# Patient Record
Sex: MALE | Race: WHITE
[De-identification: names, ages, dates, MRNs, and addresses within clinical notes are randomized per-mention and may not be internally consistent; named-entity substitution may affect disease eponyms.]

---

## 2019-07-18 ENCOUNTER — HOSPITAL ENCOUNTER (OUTPATIENT)
Dept: HOSPITAL 97 - ER | Age: 29
Setting detail: OBSERVATION
LOS: 1 days | Discharge: HOME | End: 2019-07-19
Attending: FAMILY MEDICINE | Admitting: HOSPITALIST
Payer: SELF-PAY

## 2019-07-18 VITALS — BODY MASS INDEX: 27.1 KG/M2

## 2019-07-18 DIAGNOSIS — I88.0: ICD-10-CM

## 2019-07-18 DIAGNOSIS — F43.10: ICD-10-CM

## 2019-07-18 DIAGNOSIS — F17.200: ICD-10-CM

## 2019-07-18 DIAGNOSIS — Z79.899: ICD-10-CM

## 2019-07-18 DIAGNOSIS — G40.89: Primary | ICD-10-CM

## 2019-07-18 DIAGNOSIS — F90.9: ICD-10-CM
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
## 2019-07-18 DIAGNOSIS — F32.9: ICD-10-CM

## 2019-07-18 DIAGNOSIS — J98.4: ICD-10-CM

## 2019-07-18 LAB
BUN BLD-MCNC: 15 MG/DL (ref 7–18)
GLUCOSE SERPLBLD-MCNC: 169 MG/DL (ref 74–106)
HCT VFR BLD CALC: 36.9 % (ref 39.6–49)
HCT VFR BLD CALC: 41.1 % (ref 39.6–49)
LIPASE SERPL-CCNC: 481 U/L (ref 73–393)
LYMPHOCYTES # SPEC AUTO: 1.4 K/UL (ref 0.7–4.9)
LYMPHOCYTES # SPEC AUTO: 2.8 K/UL (ref 0.7–4.9)
PMV BLD: 7.9 FL (ref 7.6–11.3)
PMV BLD: 8 FL (ref 7.6–11.3)
POTASSIUM SERPL-SCNC: 4.1 MMOL/L (ref 3.5–5.1)
RBC # BLD: 4.12 M/UL (ref 4.33–5.43)
RBC # BLD: 4.5 M/UL (ref 4.33–5.43)

## 2019-07-18 PROCEDURE — 99285 EMERGENCY DEPT VISIT HI MDM: CPT

## 2019-07-18 PROCEDURE — 84100 ASSAY OF PHOSPHORUS: CPT

## 2019-07-18 PROCEDURE — 85025 COMPLETE CBC W/AUTO DIFF WBC: CPT

## 2019-07-18 PROCEDURE — 71045 X-RAY EXAM CHEST 1 VIEW: CPT

## 2019-07-18 PROCEDURE — 80053 COMPREHEN METABOLIC PANEL: CPT

## 2019-07-18 PROCEDURE — 95816 EEG AWAKE AND DROWSY: CPT

## 2019-07-18 PROCEDURE — 83690 ASSAY OF LIPASE: CPT

## 2019-07-18 PROCEDURE — 81003 URINALYSIS AUTO W/O SCOPE: CPT

## 2019-07-18 PROCEDURE — 80307 DRUG TEST PRSMV CHEM ANLYZR: CPT

## 2019-07-18 PROCEDURE — 81015 MICROSCOPIC EXAM OF URINE: CPT

## 2019-07-18 PROCEDURE — 36415 COLL VENOUS BLD VENIPUNCTURE: CPT

## 2019-07-18 PROCEDURE — 70450 CT HEAD/BRAIN W/O DYE: CPT

## 2019-07-18 PROCEDURE — 80048 BASIC METABOLIC PNL TOTAL CA: CPT

## 2019-07-18 PROCEDURE — 83735 ASSAY OF MAGNESIUM: CPT

## 2019-07-18 PROCEDURE — 83605 ASSAY OF LACTIC ACID: CPT

## 2019-07-18 PROCEDURE — 83880 ASSAY OF NATRIURETIC PEPTIDE: CPT

## 2019-07-18 PROCEDURE — 72125 CT NECK SPINE W/O DYE: CPT

## 2019-07-18 PROCEDURE — 85730 THROMBOPLASTIN TIME PARTIAL: CPT

## 2019-07-18 PROCEDURE — 96360 HYDRATION IV INFUSION INIT: CPT

## 2019-07-18 PROCEDURE — 76700 US EXAM ABDOM COMPLETE: CPT

## 2019-07-18 PROCEDURE — 85610 PROTHROMBIN TIME: CPT

## 2019-07-18 PROCEDURE — 71250 CT THORAX DX C-: CPT
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
## 2019-07-19 VITALS — TEMPERATURE: 98.5 F | SYSTOLIC BLOOD PRESSURE: 142 MMHG | DIASTOLIC BLOOD PRESSURE: 84 MMHG

## 2019-07-19 VITALS — OXYGEN SATURATION: 99 %

## 2019-07-19 LAB
ALBUMIN SERPL BCP-MCNC: 3.8 G/DL (ref 3.4–5)
ALP SERPL-CCNC: 68 U/L (ref 45–117)
ALT SERPL W P-5'-P-CCNC: 54 U/L (ref 12–78)
AST SERPL W P-5'-P-CCNC: 20 U/L (ref 15–37)
BUN BLD-MCNC: 13 MG/DL (ref 7–18)
GLUCOSE SERPLBLD-MCNC: 111 MG/DL (ref 74–106)
HCT VFR BLD CALC: 36.3 % (ref 39.6–49)
INR BLD: 1.07
LIPASE SERPL-CCNC: 534 U/L (ref 73–393)
LYMPHOCYTES # SPEC AUTO: 1.9 K/UL (ref 0.7–4.9)
MAGNESIUM SERPL-MCNC: 2.6 MG/DL (ref 1.8–2.4)
METHAMPHET UR QL SCN: POSITIVE
NT-PROBNP SERPL-MCNC: 17 PG/ML (ref ?–125)
PMV BLD: 7.9 FL (ref 7.6–11.3)
POTASSIUM SERPL-SCNC: 3.8 MMOL/L (ref 3.5–5.1)
RBC # BLD: 4.02 M/UL (ref 4.33–5.43)
THC SERPL-MCNC: NEGATIVE NG/ML
UA COMPLETE W REFLEX CULTURE PNL UR: (no result)
UA DIPSTICK W REFLEX MICRO PNL UR: (no result)

## 2019-07-19 RX ADMIN — SODIUM CHLORIDE SCH MLS: 0.9 INJECTION, SOLUTION INTRAVENOUS at 10:20

## 2019-07-19 RX ADMIN — SODIUM CHLORIDE SCH MLS: 0.9 INJECTION, SOLUTION INTRAVENOUS at 00:05

## 2019-07-19 RX ADMIN — Medication SCH ML: at 00:06

## 2019-07-19 RX ADMIN — Medication SCH ML: at 10:22
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
## 2019-07-19 NOTE — RAD REPORT
EXAM DESCRIPTION:  Oli Single View7/19/2019 7:34 am

 

CLINICAL HISTORY:  Cough

 

COMPARISON:  none

 

FINDINGS:   The lungs appear clear of acute infiltrate. The heart is normal size

 

IMPRESSION:   No acute abnormalities displayed

## 2019-07-19 NOTE — RAD REPORT
EXAM DESCRIPTION:  MRI - Mri Abdomen W/Wo Cont - 7/19/2019 12:05 pm

 

CLINICAL HISTORY:  Abdominal pain

 

COMPARISON:  July 19, 2019 ultrasound

 

July 18, 2019 CT

 

TECHNIQUE:  Axial and coronal magnetic resonance imaging of the the pancreas obtained. 20 cc MultiHan
ce administered intravenously

 

FINDINGS:  The pancreas is normal in size and signal. The pancreas demonstrates normal enhancement.

 

Stranding is present within the fat between the pancreatic head and third portion of the duodenum wit
hout significant change from the recent CAT scan. No pseudocyst.

 

A fluid collection is not seen.

 

IMPRESSION:  Stranding within the fat between the pancreatic head and third portion of the duodenum. 
This may indicate pancreatitis. An injury to the duodenum can also result in this appearance. A fluid
 collection/hematoma is not present.

## 2019-07-19 NOTE — RAD REPORT
EXAM DESCRIPTION:  US - Abdomen Exam Complete - 7/19/2019 9:35 am

 

CLINICAL HISTORY:  Abdominal pain

 

COMPARISON:  none

 

FINDINGS:  The liver has an increased and heterogeneous echotexture.

 

A gallstone is not seen. The gallbladder wall is not thickened. The biliary tree is normal caliber.

 

The pancreas was not well visualized secondary overlying bowel

 

The right kidney measures 11 centimeters with a normal echotexture.

 

The left kidney measures 12 centimeters with a normal echotexture.

 

The spleen measures 12.3 centimeters.

 

Portions of the abdominal aorta are not well visualized. However, the aorta appears to be normal claire
jennie. IVC unremarkable

 

IMPRESSION:  Increased and heterogeneous hepatic echotexture consistent with fatty infiltration/infla
mmation

 

Limited evaluation of the pancreas

## 2019-07-24 NOTE — EEG
CHART:  [id number]

TEST ID#:  [id number]

DATE OF STUDY:  07/19/2019



THE EEG WAS RECORDED PORTABLE IN THE PATIENTS ROOM ON A 17 CHANNEL MACHINE.  ELECTRODES 
WERE APPLIED IN THE USUAL MANNER USING THE INTERNATIONAL 10-20 SYSTEM.



THE WAKING BACKGROUND RHYTHM IN THIS RECORD CONSISTS OF VERY WELL DEVELOPED AND WELL 
ORGANIZED WAVES OF 10 HZ., MAXIMAL IN THE POSTERIOR HEAD REGIONS WHICH ATTENUATE NORMALLY 
WITH EYE OPENING.  LOW-VOLTAGE 18-22 HZ ACTIVITY IS EXPRESSED IN THE FRONTAL REGIONS.



THERE ARE NO FOCAL OR LATERALIZING FEATURES.  NO EPILEPTIFORM ACTIVITY APPEARS.  

SLEEP DID NOT OCCUR.  



HYPERVENTILATION WAS NOT PERFORMED.



PHOTIC STIMULATION PRODUCED FAIR DRIVING BILATERALLY.



IMPRESSION:  NORMAL EEG FOR THE AGE OF THE PATIENT IN [place].

## 2023-08-26 ENCOUNTER — HOSPITAL ENCOUNTER (EMERGENCY)
Dept: HOSPITAL 97 - ER | Age: 33
Discharge: HOME | End: 2023-08-26
Payer: SELF-PAY

## 2023-08-26 VITALS — TEMPERATURE: 98 F

## 2023-08-26 VITALS — OXYGEN SATURATION: 99 % | SYSTOLIC BLOOD PRESSURE: 117 MMHG | DIASTOLIC BLOOD PRESSURE: 74 MMHG

## 2023-08-26 DIAGNOSIS — S90.112A: Primary | ICD-10-CM
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
## 2023-08-26 PROCEDURE — 99283 EMERGENCY DEPT VISIT LOW MDM: CPT

## 2023-08-26 NOTE — EDPHYS
Physician Documentation                                                                           

 [facility]                                                                 

Name: Mc Kearney                                                                           

Age: 33 yrs                                                                                       

Sex: Male                                                                                         

: 1990                                                                                   

MRN: [id number]                                                                                   

Arrival Date: 2023                                                                          

Time: 04:19                                                                                       

Account#: [id number]                                                                            

Bed 5                                                                                             

Private MD:                                                                                       

ED Physician Daniel Bolivar                                                                    

HPI:                                                                                              

                                                                                             

04:24 This 33 yrs old  Male presents to ER via [facility] with complaints of Toe     sp4 

      Injury.                                                                                     

04:31 33-year-old male presents with a left great toe pain . Patient states he was moving a   sp4 

      couch yesterday and dropped it onto the left great toe. Patient was seen at [facility] and diagnosed with a left great toe fracture. Patient was then prescribed Tylenol      

      3 but states pain is not improving.. Patient has extensive past medical history of          

      schizophrenia, chronic pancreatitis, diabetes. History of alcohol abuse as well.            

      History of Depo injections of antipsychotic medications for your schizophrenia..            

                                                                                                  

Historical:                                                                                       

- Allergies:                                                                                      

04:30 PENICILLINS;                                                                            rv  

- PMHx:                                                                                           

04:30 Seizures; Anxiety induced;                                                              rv  

                                                                                                  

- Immunization history:: Adult Immunizations up to date.                                          

- Social history:: Smoking status: Patient reports the use of cigarette tobacco                   

  products, smokes one pack cigarettes per day.                                                   

- Family history:: not pertinent.                                                                 

                                                                                                  

                                                                                                  

ROS:                                                                                              

04:31 Constitutional: Negative for fever, chills, and weight loss, MS/Extremity: Positive for sp4 

      left great toe injury and left great toe pain also left great toe discoloration.            

04:31 All other systems are negative.                                                             

                                                                                                  

Exam:                                                                                             

04:31 Constitutional:  This is a well developed, well nourished patient who is awake, alert,  sp4 

      and in no acute distress. Head/Face:  Normocephalic, atraumatic. Eyes:  Pupils equal        

      round and reactive to light, extra-ocular motions intact.  Lids and lashes normal.          

      Conjunctiva and sclera are not injected.  Cornea within normal limits.  Periorbital         

      areas with no swelling, redness, or edema. ENT:  Nares patent. No nasal discharge, no       

      septal abnormalities noted.  Tympanic membranes are normal and external auditory canals     

      are clear.  Oropharynx with no redness, swelling, or masses, exudates, or evidence of       

      obstruction, uvula midline.  Mucous membranes moist. Neck:  Trachea midline, no             

      thyromegaly or masses palpated, and no cervical lymphadenopathy.  Supple, full range of     

      motion without nuchal rigidity, or vertebral point tenderness.  Chest/axilla:  Normal       

      chest wall appearance and motion.  Nontender with no deformity.  No lesions are             

      appreciated. Cardiovascular:  Regular rate and rhythm with a normal S1 and S2.  No          

      gallops, murmurs, or rubs.  Normal PMI, no JVD.  No pulse deficits. Respiratory:  Lungs     

      have equal breath sounds bilaterally, clear to auscultation and percussion.  No rales,      

      rhonchi or wheezes noted.  No increased work of breathing, no retractions or nasal          

      flaring. Abdomen/GI:  Soft, non-tender, with normal bowel sounds.  No distension or         

      tympany.  No guarding or rebound.  No evidence of tenderness throughout. Back:  No          

      spinal tenderness.  No costovertebral tenderness.  Skin:  Warm, dry with normal turgor.     

       Normal color with no rashes, no lesions, and no evidence of cellulitis. MS/ Extremity:     

       Pulses equal, no cyanosis.  Neurovascular intact.  Full, normal range of motion.           

      Positive for left great toe tenderness, left great toe discoloration without deformity.     

       Normal cap refill Neuro:  Awake and alert, GCS 15, oriented to person, place, time,        

      and situation.  Cranial nerves II-XII grossly intact.  Motor strength 5/5 in all            

      extremities.  Sensory grossly intact.  Psych:  Awake, alert, with orientation to            

      person, place and time.  Behavior, mood, and affect are within normal limits                

                                                                                                  

Vital Signs:                                                                                      

04:29  / 89; Pulse 107; Resp 18; Temp 98; Pulse Ox 98% ; Weight 81.19 kg; Height 5 ft.  rv  

      9 in. ;                                                                                     

06:14  / 74; Pulse 89; Resp 18; Temp 98; Pulse Ox 99% on R/A;                           rv  

04:29 Body Mass Index 26.43 (81.19 kg, 175.26 cm)                                             rv  

                                                                                                  

MDM:                                                                                              

05:12 Patient medically screened.                                                             sp4 

06:07 Data reviewed: vital signs, nurses notes. ED course: EXAM DESCRIPTION: Foot Left 2 View sp4 

      CLINICAL HISTORY: left great toe pain COMPARISON: None. FINDINGS: 2 views of the left       

      foot. No acute fracture or dislocation. Normal osseous mineralization. IMPRESSION: 1.       

      No acute fracture or dislocation.. ED course: Left foot Ortho boot was applied for          

      comfort. No sign of fracture or dislocation. Will advise left foot Ortho boot for the       

      next 2 weeks..                                                                              

                                                                                                  

                                                                                             

04:30 Order name: Foot Left 2 View XRAY                                                       sp4 

                                                                                             

04:37 Order name: Orthopedic shoe: Apply short leg ortho BOOT; Complete Time: 04:39           sp4 

                                                                                                  

Administered Medications:                                                                         

04:39 Drug: Norco PO 10 mg-325 mg 1 tabs Route: PO;                                           rv  

06:14 Follow up: Response: No adverse reaction                                                rv  

04:39 Drug: Diazepam PO 5 mg Route: PO;                                                       rv  

06:14 Follow up: Response: No adverse reaction                                                rv  

04:39 Drug: Ibuprofen  mg Route: PO;                                                    rv  

06:13 Follow up: Response: No adverse reaction                                                rv  

                                                                                                  

                                                                                                  

Disposition Summary:                                                                              

23 06:09                                                                                    

Discharge Ordered                                                                                 

      Location: Home                                                                          sp4 

      Problem: new                                                                            sp4 

      Symptoms: have improved                                                                 sp4 

      Condition: Stable                                                                       sp4 

      Diagnosis                                                                                   

        - Left great toe contusion, left foot crush injury                                    sp4 

      Followup:                                                                               sp4 

        - With: Private Physician                                                                  

        - When: 7 - 10 days                                                                        

        - Reason: Recheck today's complaints                                                       

      Discharge Instructions:                                                                     

        - Discharge Summary Sheet                                                             sp4 

        - Foot Contusion, Easy-to-Read                                                        sp4 

      Forms:                                                                                      

        - Patient Portal Instructions                                                         sp4 

      Prescriptions:                                                                              

        - Ibuprofen 800 mg Oral Tablet                                                             

            - take 1 tablet by ORAL route every 8 hours As needed take with food; 30 tablet;  sp4 

      Refills: 0, Product Selection Permitted                                                     

Signatures:                                                                                       

Dispatcher [employer]                           Mynor Cancino, RN                    RN   Daniel Greer MD MD   sp4                                                  

                                                                                                  

**************************************************************************************************

## 2023-08-26 NOTE — ER
Nurse's Notes                                                                                     

 [facility]                                                                 

Name: Mc Kearney                                                                           

Age: 33 yrs                                                                                       

Sex: Male                                                                                         

: 1990                                                                                   

MRN: [id number]                                                                                   

Arrival Date: 2023                                                                          

Time: 04:19                                                                                       

Account#: [id number]                                                                            

Bed 5                                                                                             

Private MD:                                                                                       

Diagnosis: Left great toe contusion, left foot crush injury                                       

                                                                                                  

Presentation:                                                                                     

                                                                                             

04:29 Chief complaint: Patient states: INJURY TO THE LEFT BIG TOE YESTERDAY, XRAY DONE IN     [facility], CONFIRMED FRACTURE, SENT HOME WITH PAIN MEDS. PAIN PROGRESSIVELY WORSE TODAY.     

      Coronavirus screen: At this time, the client does not indicate any symptoms associated      

      with coronavirus-19. Ebola Screen: No symptoms or risks identified at this time.            

      Initial Sepsis Screen: Does the patient meet any 2 criteria? No. Patient's initial          

      sepsis screen is negative. Does the patient have a suspected source of infection? No.       

      Patient's initial sepsis screen is negative. Risk Assessment: Do you want to hurt           

      yourself or someone else? Patient reports no desire to harm self or others. Onset of        

      symptoms was 2023.                                                               

04:29 Method Of Arrival: Ambulatory                                                             

04:29 Acuity: YANNA 4                                                                           rv  

                                                                                                  

Triage Assessment:                                                                                

04:30 General: Appears comfortable, Behavior is calm, cooperative. Pain: Complains of pain in rv  

      left foot. Neuro: Level of Consciousness is awake, alert, obeys commands, Oriented to       

      person, place, time, situation. Cardiovascular: Capillary refill Patient's skin is warm     

      and dry. Respiratory: Airway is patent Respiratory effort is even, unlabored.               

      Musculoskeletal: Capillary refill < 3 seconds, Swelling absent.                             

                                                                                                  

Historical:                                                                                       

- Allergies:                                                                                      

04:30 PENICILLINS;                                                                            rv  

- PMHx:                                                                                           

04:30 Seizures; Anxiety induced;                                                              rv  

                                                                                                  

- Immunization history:: Adult Immunizations up to date.                                          

- Social history:: Smoking status: Patient reports the use of cigarette tobacco                   

  products, smokes one pack cigarettes per day.                                                   

- Family history:: not pertinent.                                                                 

                                                                                                  

                                                                                                  

Screenin:32 [facility] ED Fall Risk Assessment (Adult) History of falling in the last 3 months,       rv  

      including since admission No falls in past 3 months (0 pts) Confusion or Disorientation     

      No (0 pts). [facility] ED Fall Risk Assessment (Adult) Score/Fall Risk Level 0 - 2 = Low      

      Risk Oriented to surroundings, Maintained a safe environment, Educated pt \T\ family on     

      fall prevention, incl call for assistance when getting out of bed, Assessed \T\             

      reinforced patient's understanding of fall precautions, Provided non-skid footwear,         

      Hourly rounding (assess needs \T\ fall precautionary measures) done, Used ambulatory aids   

      as needed (educated on \T\ assisted with), Used gait belt as appropriate. Abuse screen:     

      Denies threats or abuse. Denies injuries from another. Nutritional screening: No            

      deficits noted. Tuberculosis screening: No symptoms or risk factors identified.             

                                                                                                  

Assessment:                                                                                       

04:32 Reassessment: SEE TRIAGE NOTES. Pain:.                                                  rv  

                                                                                                  

Vital Signs:                                                                                      

04:29  / 89; Pulse 107; Resp 18; Temp 98; Pulse Ox 98% ; Weight 81.19 kg; Height 5 ft.  rv  

      9 in. ;                                                                                     

06:14  / 74; Pulse 89; Resp 18; Temp 98; Pulse Ox 99% on R/A;                           rv  

04:29 Body Mass Index 26.43 (81.19 kg, 175.26 cm)                                             rv  

                                                                                                  

ED Course:                                                                                        

04:21 Patient arrived in ED.                                                                  jj6 

04:22 Mynor Jennings, RN is Primary Nurse.                                                  rv  

04:24 Daniel Bolivar MD is Attending Physician.                                           sp4 

04:30 Triage completed.                                                                       rv  

04:31 Arm band placed on right wrist.                                                         rv  

04:32 Patient has correct armband on for positive identification. Provided Education on: PAIN rv  

      MANAGEMENT.                                                                                 

04:32 No provider procedures requiring assistance completed. Patient did not have IV access   rv  

      during this emergency room visit.                                                           

04:42 Foot Left 2 View XRAY In Process Unspecified.                                           EDMS

                                                                                                  

Administered Medications:                                                                         

04:39 Drug: Norco PO 10 mg-325 mg 1 tabs Route: PO;                                           rv  

06:14 Follow up: Response: No adverse reaction                                                rv  

04:39 Drug: Diazepam PO 5 mg Route: PO;                                                       rv  

06:14 Follow up: Response: No adverse reaction                                                rv  

04:39 Drug: Ibuprofen  mg Route: PO;                                                    rv  

06:13 Follow up: Response: No adverse reaction                                                rv  

                                                                                                  

                                                                                                  

Medication:                                                                                       

04:32 VIS not applicable for this client.                                                     rv  

                                                                                                  

Outcome:                                                                                          

06:09 Discharge ordered by MD.                                                                sp4 

06:13 Discharged to home ambulatory.                                                          rv  

06:13 Condition: good                                                                             

06:13 Discharge instructions given to patient, Instructed on discharge instructions, follow       

      up and referral plans. medication usage, Demonstrated understanding of instructions,        

      follow-up care, medications, Prescriptions given X 1.                                       

06:14 Patient left the ED.                                                                    rv  

                                                                                                  

Signatures:                                                                                       

Dispatcher [employer]                           EDMS                                                 

Mynor Jennings RN                    RN   Coco Valenciaj6                                                  

Daniel Bolivar MD MD   sp4                                                  

                                                                                                  

**************************************************************************************************

## 2023-08-26 NOTE — XMS REPORT
Continuity of Care Document

                           Created on:2023



Patient:MC BOWLES

Sex:Male

:1990

External Reference #:[id number]





Demographics







                          Address                   [address] [place] [address]

 

                          Home Phone                [phone]

 

                          Work Phone                [phone]

 

                          Mobile Phone              [phone]

 

                          Email Address             [email or web address]

 

                          Preferred Language        English

 

                          Marital Status            Unknown

 

                          Episcopalian Affiliation     Unknown

 

                          Race                      Unknown

 

                          Additional Race(s)        Unavailable

 

                          Ethnic Group              Unknown









Author







                          Organization              [facility]

t

 

                          Address                   [address] [place] [address] [address] [place] [id number]

 

                          Phone                     [phone]









Support







                Name            Relationship    Address         Phone

 

                SUSAN KENNEDY Mother          [address]  [phone]



                                                [place] [address] 

 

                one else per patient, No Unavailable     Unavailable     Unavail

able

 

                PATIENT, NO ONE ELSE PER Unavailable     Unavailable     Unavail

able

 

                PETER VALDOVINOS Aunt            Unavailable     [phone]

6









Care Team Providers







                    Name                Role                Phone

 

                    CHASTITY SOLORZANO    Primary Care Physician Unavailable

 

                    EAMON SIMON       Attending Clinician Unavailable

 

                    DELISA MATHUR   Attending Clinician Unavailable

 

                    SHIRA COY     Attending Clinician Unavailable

 

                    Chastity Solorzano MD Attending Clinician [phone]

 

                    TRACY RUDD  Attending Clinician Unavailable

 

                    Tracy Rudd DO Attending Clinician [phone]

 

                    Pcp-Lab             Attending Clinician Unavailable

 

                    Pathology           Attending Clinician Unavailable

 

                    PATHOLOGY           Attending Clinician Unavailable

 

                    Doctor Unassigned, No Name Attending Clinician Unavailable

 

                    BUTCH RUIZ        Attending Clinician Unavailable

 

                    Butch Ruiz DO     Attending Clinician [phone]

 

                    ANDRIY WEAVER    Attending Clinician Unavailable

 

                    Andriy Weaver MD Attending Clinician [phone]

 

                    LORENE GUERRERO   Attending Clinician Unavailable

 

                    Lorene Guerrero MD Attending Clinician [phone]

 

                    OLAF STRICKLAND     Attending Clinician Unavailable

 

                    Olaf Strickland DO  Attending Clinician [phone]

 

                    Linda Rojas Attending Clinician Unavailable

 

                    JAGJIT LEO Attending Clinician Unavailable

 

                    Jagjit Wallis Attending Clinician [phone]

 

                    Martín Nix   Attending Clinician [phone]

 

                    Dung Waller MD  Attending Clinician [phone]

 

                    MARKELL KAUFFMAN    Attending Clinician Unavailable

 

                    Markell Romero Attending Clinician [phone]

 

                    Joseph Leal MD Attending Clinician [phone]

 

                    JOSEPH LEAL   Attending Clinician Unavailable

 

                    LUBNA PIMENTEL    Attending Clinician Unavailable

 

                    Malcolm WEBSTER, Lubna SOLORZANO Attending Clinician [phone]

 

                    Eamon Simon MD    Attending Clinician Unavailable

 

                    JAGJIT CLAIRE Attending Clinician Unavailable

 

                    Brian Brock MD Attending Clinician [phone]

 

                    SCHOENSTEIN, LYNDA  Attending Clinician Unavailable

 

                    Yonis Starr  Attending Clinician [phone]

 

                    Sourav Sanchez MD   Attending Clinician [phone]

 

                    Schoenstein MD, Lynda Attending Clinician [phone]

 

                    BRIAN BROCK    Attending Clinician Unavailable

 

                    ARMAAN CHERRY Attending Clinician Unavailable

 

                    Armaan Cherry MD Attending Clinician [phone]

 

                    Mika Jaramillo MD Attending Clinician Unavailable

 

                    Tita Pruitt MD Attending Clinician Unavailable

 

                    PERFECTO MCHUGH     Attending Clinician Unavailable

 

                    PERFECTO Hoff Attending Clinician [phone]

 

                    Kimberly Gorman MD  Attending Clinician [phone]

 

                    Unknown, Attending  Attending Clinician Unavailable

 

                    Jagjit Claire MD Attending Clinician [phone]

 

                    Bill Nixon MD Attending Clinician [phone]

 

                    BILL NIXON Attending Clinician Unavailable

 

                    MIKA JARAMILLO Attending Clinician Unavailable

 

                    UNKNOWN, ATTENDING  Attending Clinician Unavailable

 

                    Pob, Adc Lab Main   Attending Clinician Unavailable

 

                    Kary Strauss MD Attending Clinician [phone]

 

                    MARY BETH CORCORAN    Attending Clinician Unavailable

 

                    Mary Beth Diaz Attending Clinician [phone]

 

                    Carina Womack MA Attending Clinician Unavailable

 

                    Abbe Blevins MD     Attending Clinician [phone]

 

                    Nurse, Pcp Immunization Attending Clinician Unavailable

 

                    Shamar Gardiner DO Attending Clinician [phone]

 

                    SHAMAR GARDINER Attending Clinician Unavailable

 

                    Gisselle ASHTON, Erendira F Attending Clinician Unavailable

 

                    Liza De La Cruz MD     Attending Clinician [phone]

 

                    LIZA DE LA CRUZ        Attending Clinician Unavailable

 

                    Pcp, Patient Does Not Have A Attending Clinician [phone]

[phone]

 

                    Vls-Lab             Attending Clinician Unavailable

 

                    Only, Lcc Test      Attending Clinician Unavailable

 

                    Reno Patricia MD  Attending Clinician [phone]

 

                    RENO PATRICIA     Attending Clinician Unavailable

 

                    KARY STRAUSS Attending Clinician Unavailable

 

                    LETICIA MOTTA Attending Clinician Unavailable

 

                    Rodri HUMPHREY, Anais MUNOZ    Attending Clinician [phone]

 

                    Héctor LEUNG, Steve Montalvo Attending Clinician [phone]

 

                    STEVE CASTILLO Attending Clinician Unavailable

 

                    STEVE CASTILLO Attending Clinician Unavailable

 

                    Hortencia Lorenz   Attending Clinician [phone]

 

                    Amrit Lopez MD  Attending Clinician [phone]

 

                    Keisha Dudley MD   Attending Clinician [phone]

 

                    Amber Childs MD   Attending Clinician [phone]

 

                    AMBER CHILDS      Attending Clinician Unavailable

 

                    Dian Bah RN Attending Clinician Unavailable

 

                    [facility] Attending Clinician [phone]

 

                    Antonio HUMPHREY, [place] CARTER Attending Clinician [phone]

 

                    Ana Maria Rajput MD  Attending Clinician [phone]

 

                    Rocco Simon MD  Attending Clinician [phone]

 

                    ANA MARIA RAJPUT     Attending Clinician Unavailable

 

                    Terrence Frazier MD Attending Clinician [phone]

 

                    EAMON SIMON       Admitting Clinician Unavailable

 

                    TRACY RUDD  Admitting Clinician Unavailable

 

                    ANDRIY WEAVER    Admitting Clinician Unavailable

 

                    LORENE GUERRERO   Admitting Clinician Unavailable

 

                    MARKELL KAUFFMAN    Admitting Clinician Unavailable

 

                    LUBNA PIMENTEL    Admitting Clinician Unavailable

 

                    MARY BETH CORCORAN    Admitting Clinician Unavailable

 

                    Alfred LEUNG, Eamon    Admitting Clinician [phone]

 

                    PERFECTO MCHUGH     Admitting Clinician Unavailable

 

                    Amber Childs MD   Admitting Clinician [phone]

 

                    AMBER CHILDS      Admitting Clinician Unavailable

 

                    Rocco Simon MD  Admitting Clinician [phone]

 

                    ROCCO SIMON     Admitting Clinician Unavailable









Payers







           Payer Name Policy Type Policy Number Effective Date Expiration Date ELMER perez

 

           MEDICAID SSI            PENDING    2023 



           PENDING                          00:00:00   00:00:00   







Problems







       Condition Condition Condition Status Onset  Resolution Last   Treating Co

mments 

Source



       Name   Details Category        Date   Date   Treatment Clinician        



                                                 Date                 

 

       Pancreatic Pancreatic Disease Active                              U

nivers



       insufficie insufficie               7-25                               it

y of



       ncy    ncy                  00:00:                             [place]



                                                                    [facility]



                                                                      Branch

 

       Splenic Splenic Disease Active 2021                             Univers



       vein   vein                 2-03                               ity of



       thrombosis thrombosis               00:00:                             [place]



                                                                    [facility]



                                                                      [facility]

 

       Chronic Chronic Disease Active                       Overview: Univ

ers



       pancreatit pancreatit               6-30                        Formattin

 ity of



       is,    is,                  00:00:                      g of this [place]



       unspecifie unspecifie               00                          note   Me

dical



       d      d                                                might be Branch



       pancreatit pancreatit                                           different

 



       is type is type                                           from the 



                                                               original. 



                                                               Added  



                                                               automatic 



                                                               ally from 



                                                               request 



                                                               for    



                                                               surgery 



                                                               [id number] 

 

       Type 2 Type 2 Disease Active                              Univers



       diabetes diabetes               3-25                               ity of



       mellitus mellitus               00:00:                             [place]



       without without               00                                 Medical



       complicati complicati                                                  Br

anch



       on, with on, with                                                  



       long-term long-term                                                  



       current current                                                  



       use of use of                                                  



       insulin insulin                                                  

 

       Anxiety Anxiety Disease Active                              Univers



                                   3-25                               ity of



                                   00:00:                             [place]



                                   00                                 Medical



                                                                      Branch

 

       Psychiatri Psychiatri Disease Active                              U

nivers



       c disorder c disorder               3-25                               it

y of



                                   00:00:                             [place]



                                   00                                 Medical



                                                                      Branch







Allergies, Adverse Reactions, Alerts







       Allergy Allergy Status Severity Reaction(s) Onset  Inactive Treating Comm

ents 

Source



       Name   Type                        Date   Date   Clinician        

 

       Penicill Propensi Active        Hives  2020                      Univer

s



       ins    ty to                       2-25                        ity of



              adverse                      00:00:                      [place]



              reaction                      00                          Medical



              s                                                       Branch

 

       PENICILL Drug   Active        Hiv2020                      Univers



       INS    Class                       2-25                        ity of



                                          00:00:                      [place]



                                          00                          Medical



                                                                      Branch

 

       Penicill Propensi Active        Hives  2020                      Univer

s



       ins    ty to                       2-25                        ity of



              adverse                      00:00:                      [place]



              reaction                      00                          Medical



              s                                                       Branch







Social History







           Social Habit Start Date Stop Date  Quantity   Comments   Source

 

           History of tobacco                       Cigarette Smoker            

University of



           use                                                    [place] [facility]



                                                                  Branch

 

           History SDOH                                             University o

f



           Housing Places                                             [facility]

 

           Gender identity                                             Universit

y of



                                                                  [facility]

 

           Sexual orientation                                             Univer

sity of



                                                                  [facility]

 

           Alcohol intake 2023 Ex-drinker            University

 of



                      00:00:00   00:00:00   (finding)             [facility]



                                                                  Branch

 

           History of Social 2023                       Univers

ity of



           function   00:00:00   00:00:00                         [facility]



                                                                  Branch

 

           Exposure to 2023 Not sure              University 



           SARS-CoV-2 (event) 00:00:00   22:03:00                         [facility]

 

           Tobacco use and 2023 Smokeless             Universit

y of



           exposure   00:00:00   00:00:00   tobacco non-user            [place] [facility]

dical



                                                                  [place]

 

           Tobacco Comment 2023 Delta 8               Universit

y of



                      00:00:00   00:00:00                         [facility]

 

           Cigarettes smoked 2023                       Univers

ity of



           current (pack per 00:00:00   00:00:00                         [facility]

edical



           day) - Reported                                             Branch

 

           History SDOH 2023 1                     University o

f



           Alcohol Frequency 00:00:00   00:00:00                         [facility]

edical



                                                                  Branch

 

           History SDOH 2023 0                     University o

f



           Alcohol Std Drinks 00:00:00   00:00:00                         [place] 

[facility]



                                                                  Branch

 

           History SDOH 2023 1                     University o

f



           Alcohol Binge 00:00:00   00:00:00                         [place] [facility]

al



                                                                  Branch

 

           History SDOH Social 2023 5                     Unive

rsity of



           Connections Phone 00:00:00   00:00:00                         [facility]

edical



                                                                  Branch

 

           History SDOH Social 2023 3                     Unive

rsity of



           Connections Get 00:00:00   00:00:00                         [place] [facility]

ical



           Together                                               Branch

 

           History SDOH Social 2023 2                     Unive

rsity of



           Connections Hoahaoism 00:00:00   00:00:00                         [place] 

Medical



                                                                  Branch

 

           History SDOH Social 2023 1                     Unive

rsity of



           Connections 00:00:00   00:00:00                         [place] [facility]



           Membership                                             Branch

 

           History SDOH Social 2023 3                     Unive

rsity of



           Connections 00:00:00   00:00:00                         [place] [facility]



           Meetings                                               Branch

 

           History SDOH Social 2023 7                     Unive

rsity of



           Connections Living 00:00:00   00:00:00                         [place] 

[facility]



                                                                  Branch

 

           History SDOH 2023 7                     University o

f



           Physical Activity 00:00:00   00:00:00                         [place] [facility]

edical



           DPW                                                    Branch

 

           History SDOH 2023 3                     University o

f



           Physical Activity 00:00:00   00:00:00                         [place] [facility]

edical



           MPS                                                    Branch

 

           History SDOH Stress 2023 1                     Unive

rsity of



                      00:00:00   00:00:00                         [place] [facility]



                                                                  Branch

 

           History SDOH 2023 3                     University o

f



           Housing Unable to 00:00:00   00:00:00                         [facility]

edical



           Pay                                                    Branch

 

           History SDOH 2023 3                     University o

f



           Housing Homeless 00:00:00   00:00:00                         [place] [facility]

dical



           Last Year                                              Branch

 

           Education  2021 14                    University of



                      00:00:00   00:00:00                         [place] [facility]



                                                                  Branch

 

           History SDOH 2021 5                     University o

f



           Financial  00:00:00   00:00:00                         [place] [facility]



                                                                  Branch

 

           History SDOH Food 2021 1                     Univers

ity of



           Worry      00:00:00   00:00:00                         [place] [facility]



                                                                  Branch

 

           History SDOH Food 2021 1                     Univers

ity of



           Scarcity   00:00:00   00:00:00                         [place] [facility]



                                                                  Branch

 

           History SDOH 2021 2                     University o

f



           Transport Med 00:00:00   00:00:00                         [place] [facility]

al



                                                                  Branch

 

           History SDOH 2021 2                     University o

f



           Transport Non-Med 00:00:00   00:00:00                         [place] M

edical



                                                                  Branch

 

           Alcohol Comment 2020 6 pack of beer            Unive

rsity of



                      00:00:00   00:00:00   every day since            [place] [facility]

ical



                                            17 years old. no            Branch



                                            liquor                

 

           [facility] 1990                       [facility]

[facility]



           Birth      00:00:00   00:00:00                         [facility]









                Smoking Status  Start Date      Stop Date       Source

 

                Smokes tobacco daily 2023 00:00:00                 Univers

ity of [facility]

 

                Ex-smoker       2022 00:00:00 2022      [place] o

f [place]



                                                00:00:00        [facility]

 

                Occasional tobacco 2021 00:00:00                 Universit

y [facility]



                smoker                                          [facility] Branch







Medications







       Ordered Filled Start  Stop   Current Ordering Indication Dosage Frequency

 Signature

                    Comments            Components          Source



     Medication Medication Date Date Medication? Clinician                (SIG) 

          



     Name Name                                                   

 

     NaCl 0.9%      2023- No             1000mL      at 999           Uni

vers



     (NS) bolus                                mL/hr,           ity of



     infusion      02:15: 02:37                          1,000 mL,           [facility]

as



     1,000 mL      00   :00                           IV             Medical



                                                  Infusion,           Branch



                                                  ONCE, 1           



                                                  dose, On           



                                                  u 8/3/23           



                                                  at 2115,           



                                                  ASAP           

 

     ondansetron      0 - No             4mg       4 mg, Slow          

 [facility]



     (ZOFRAN                                IV Push,           ity of



     (PF))      01:30: 01:50                          ONCE, 1           [place]



     injection 4      00   :00                           dose, On           Medi

luis alfredo



     mg                                           Thu 8/3/23           [facility]



                                                  at 2030,           



                                                  ASAP           

 

     ondansetron      -0      Yes       [id number] 4mg       Take 1           

[facility]



     4 mg      8-03                               tablet by           ity of



     disintegrat      00:00:                               mouth           [place]



     ing tablet      00                                 every 8           [facility]

l



                                                  (eight)           [facility]



                                                  hours as           



                                                  needed for           



                                                  Nausea and           



                                                  Vomiting           



                                                  (N/V).           

 

     ondansetron      -0      Yes       [id number] 4mg       Take 1           

[facility]



     4 mg      8-03                               tablet by           ity of



     disintegrat      00:00:                               mouth           [place]



     ing tablet      00                                 every 8           [facility]

l



                                                  (eight)           [facility]



                                                  hours as           



                                                  needed for           



                                                  Nausea and           



                                                  Vomiting           



                                                  (N/V).           

 

     ondansetron      -0      Yes       [id number] 4mg       Take 1           

[facility]



     4 mg      8-03                               tablet by           ity of



     disintegrat      00:00:                               mouth           [place]



     ing tablet      00                                 every 8           [facility]

l



                                                  (eight)           [facility]



                                                  hours as           



                                                  needed for           



                                                  Nausea and           



                                                  Vomiting           



                                                  (N/V).           

 

     ofloxacin      2023-0      Yes       [id number] 5[drp]      Place [facility]       

    [facility]



     0.3 % otic      7-28                37595           Drops in           ity 

of



     drops      00:00:                               both ears           [place]



               00                                 in the           Medical



                                                  morning           [facility]



                                                  and 5           



                                                  Drops in           



                                                  the            



                                                  evening.           

 

     pantoprazol      0      Yes       [id number] 40mg      Take 1          

 [facility]



     e 40 mg EC      7-28                               tablet by           ity 

of



     tablet      00:00:                               mouth in           [place]



               00                                 the            Medical



                                                  morning.           [facility]

 

     fenofibrate            Yes       [id number] 134mg      Take 2         

  [facility]



     micronized      7-28                               capsules           ity o

f



     67 mg      00:00:                               by mouth           [place]



     capsule      00                                 in the           Medical



                                                  morning.           [facility]

 

     Blood-Gluco            Yes       [id number]           Use as          

 [facility]



     se        7-28                               directed           ity of



     Meter,Yashira      00:00:                                              [place]



     nuous      00                                                Medical



     (DEXCOM G4                                                        [place]



     -SH                                                        



     ARE KIT)                                                        



     Misc                                                        

 

     Insulin            Yes       [id number] 12U       inject 12           

[facility]



     NPH-Regular      7-28                               Units           ity of



     Human Rec      00:00:                               under the           [facility]

as



     100 unit/mL      00                                 skin in           Medic

al



     (70-30)                                         the            [facility]



     injection                                         morning           



                                                  and 12           



                                                  Units in           



                                                  the            



                                                  evening.           

 

     ofloxacin            Yes       [id number] 5[drp]      Place [facility]       

    [facility]



     0.3 % otic      7-28                59642           Drops in           ity 

of



     drops      00:00:                               both ears           [place]



               00                                 in the           Medical



                                                  morning           [facility]



                                                  and 5           



                                                  Drops in           



                                                  the            



                                                  evening.           

 

     pantoprazol            Yes       [id number] 40mg      Take 1          

 [facility]



     e 40 mg EC      7-28                               tablet by           ity 

of



     tablet      00:00:                               mouth in           [place]



               00                                 the            Medical



                                                  morning.           [facility]

 

     fenofibrate            Yes       [id number] 134mg      Take 2         

  [facility]



     micronized      7-28                               capsules           ity o

f



     67 mg      00:00:                               by mouth           [place]



     capsule      00                                 in the           Medical



                                                  morning.           [facility]

 

     Blood-Gluco            Yes       [id number]           Use as          

 [facility]



     se        7-28                               directed           ity of



     Meter,Yashira      00:00:                                              [place]



     nuous      00                                                Medical



     (DEXCOM G4                                                        [place]



     -SH                                                        



     ARE KIT)                                                        



     Misc                                                        

 

     Insulin      0      Yes       [id number] 12U       inject 12           

[facility]



     NPH-Regular      7-28                               Units           ity of



     Human Rec      00:00:                               under the           [facility]

as



     100 unit/mL      00                                 skin in           Medic

al



     (70-30)                                         the            [facility]



     injection                                         morning           



                                                  and 12           



                                                  Units in           



                                                  the            



                                                  evening.           

 

     ofloxacin      0      Yes       [id number] 5[drp]      Place [facility]       

    [facility]



     0.3 % otic      7-28                85763           Drops in           ity 

of



     drops      00:00:                               both ears           [place]



               00                                 in the           Medical



                                                  morning           [facility]



                                                  and 5           



                                                  Drops in           



                                                  the            



                                                  evening.           

 

     pantoprazol      0      Yes       [id number] 40mg      Take 1          

 [facility]



     e 40 mg EC      7-28                               tablet by           ity 

of



     tablet      00:00:                               mouth in           [place]



               00                                 the            Medical



                                                  morning.           [facility]

 

     fenofibrate            Yes       [id number] 134mg      Take 2         

  [facility]



     micronized      7-28                               capsules           ity o

f



     67 mg      00:00:                               by mouth           [place]



     capsule      00                                 in the           Medical



                                                  morning.           [facility]

 

     Blood-Gluco            Yes       [id number]           Use as          

 [facility]



     se        7-28                               directed           ity of



     Meter,Yashira      00:00:                                              [place]



     nuous      00                                                Medical



     (DEXCOM G4                                                        [place]



     -SH                                                        



     ARE KIT)                                                        



     Misc                                                        

 

     Insulin            Yes       [id number] 12U       inject 12           

[facility]



     NPH-Regular      7-28                               Units           ity of



     Human Rec      00:00:                               under the           [facility]

as



     100 unit/mL      00                                 skin in           Medic

al



     (-30)                                         the            [facility]



     injection                                         morning           



                                                  and 12           



                                                  Units in           



                                                  the            



                                                  evening.           

 

     ofloxacin            Yes       [id number] 5[drp]      Place [facility]       

    [facility]



     0.3 % otic      7-28                92254           Drops in           ity 

of



     drops      00:00:                               both ears           [place]



               00                                 in the           Medical



                                                  morning           [facility]



                                                  and 5           



                                                  Drops in           



                                                  the            



                                                  evening.           

 

     pantoprazol            Yes       [id number] 40mg      Take 1          

 [facility]



     e 40 mg EC      7-28                               tablet by           ity 

of



     tablet      00:00:                               mouth in           [place]



               00                                 the            Medical



                                                  morning.           [facility]

 

     fenofibrate            Yes       [id number] 134mg      Take 2         

  [facility]



     micronized      7-28                               capsules           ity o

f



     67 mg      00:00:                               by mouth           [place]



     capsule      00                                 in the           Medical



                                                  morning.           [facility]

 

     Blood-Gluco            Yes       [id number]           Use as          

 [facility]



     se        7-28                               directed           ity of



     Meter,Yashira      00:00:                                              [place]



     nuous      00                                                Medical



     (DEXCOM G4                                                        [place]



     -SH                                                        



     ARE KIT)                                                        



     Misc                                                        

 

     Insulin      -0      Yes       [id number] 12U       inject 12           

[facility]



     NPH-Regular      7-28                               Units           ity of



     Human Rec      00:00:                               under the           [facility]

as



     100 unit/mL      00                                 skin in           Medic

al



     (70-30)                                         the            [facility]



     injection                                         morning           



                                                  and 12           



                                                  Units in           



                                                  the            



                                                  evening.           

 

     ofloxacin      0      Yes       [id number] 5[drp]      Place [facility]       

    [facility]



     0.3 % otic      7-28                37745           Drops in           ity 

of



     drops      00:00:                               both ears           [place]



               00                                 in the           Medical



                                                  morning           [facility]



                                                  and 5           



                                                  Drops in           



                                                  the            



                                                  evening.           

 

     pantoprazol      -0      Yes       [id number] 40mg      Take 1          

 [facility]



     e 40 mg EC      7-28                               tablet by           ity 

of



     tablet      00:00:                               mouth in           [place]



               00                                 the            Medical



                                                  morning.           [facility]

 

     fenofibrate      -0      Yes       [id number] 134mg      Take 2         

  [facility]



     micronized      7-28                               capsules           ity o

f



     67 mg      00:00:                               by mouth           [place]



     capsule      00                                 in the           Medical



                                                  morning.           [facility]

 

     Blood-Gluco      -0      Yes       [id number]           Use as          

 [facility]



     se        7-28                               directed           ity of



     Meter,Yashira      00:00:                                              [place]



     nuous      00                                                Medical



     (DEXCOM G4                                                        [place]



     -SH                                                        



     ARE KIT)                                                        



     Misc                                                        

 

     Insulin      -0      Yes       [id number] 12U       inject 12           

[facility]



     NPH-Regular      7-28                               Units           ity of



     Human Rec      00:00:                               under the           [facility]

as



     100 unit/mL      00                                 skin in           Medic

al



     ()                                         the            [facility]



     injection                                         morning           



                                                  and 12           



                                                  Units in           



                                                  the            



                                                  evening.           

 

     ofloxacin            Yes       [id number] 5[drp]      Place [facility]       

    [facility]



     0.3 % otic      7-28                88258           Drops in           ity 

of



     drops      00:00:                               both ears           [place]



               00                                 in the           Medical



                                                  morning           [facility]



                                                  and 5           



                                                  Drops in           



                                                  the            



                                                  evening.           

 

     pantoprazol      0      Yes       [id number] 40mg      Take 1          

 [facility]



     e 40 mg EC      7-28                               tablet by           ity 

of



     tablet      00:00:                               mouth in           [place]



               00                                 the            Medical



                                                  morning.           [facility]

 

     fenofibrate            Yes       [id number] 134mg      Take 2         

  [facility]



     micronized      7-28                               capsules           ity o

f



     67 mg      00:00:                               by mouth           [place]



     capsule      00                                 in the           Medical



                                                  morning.           [facility]

 

     Blood-Gluco      -0      Yes       [id number]           Use as          

 [facility]



     se        7-28                               directed           ity of



     Meter,Yashira      00:00:                                              [place]



     nuous      00                                                Medical



     (DEXCOM G4                                                        [place]



     -SH                                                        



     ARE KIT)                                                        



     Misc                                                        

 

     Insulin      -0      Yes       [id number] 12U       inject 12           

[facility]



     NPH-Regular      7-28                               Units           ity of



     Human Rec      00:00:                               under the           [facility]

as



     100 unit/mL      00                                 skin in           Medic

al



     (7030)                                         the            [facility]



     injection                                         morning           



                                                  and 12           



                                                  Units in           



                                                  the            



                                                  evening.           

 

     ofloxacin      -0      Yes       [id number] 5[drp]      Place [facility]       

    [facility]



     0.3 % otic      7-28                91934           Drops in           ity 

of



     drops      00:00:                               both ears           [place]



               00                                 in the           Medical



                                                  morning           [facility]



                                                  and 5           



                                                  Drops in           



                                                  the            



                                                  evening.           

 

     pantoprazol      -0      Yes       [id number] 40mg      Take 1          

 [facility]



     e 40 mg EC      7-28                               tablet by           ity 

of



     tablet      00:00:                               mouth in           [place]



               00                                 the            Medical



                                                  morning.           [facility]

 

     fenofibrate      -0      Yes       [id number] 134mg      Take 2         

  [facility]



     micronized      7-28                               capsules           ity o

f



     67 mg      00:00:                               by mouth           [place]



     capsule      00                                 in the           Medical



                                                  morning.           [facility]

 

     Blood-Gluco      -0      Yes       [id number]           Use as          

 [facility]



     se        7-28                               directed           ity of



     Meter,Yashira      00:00:                                              [place]



     nuous      00                                                Medical



     (DEXCOM G4                                                        [place]



     -SH                                                        



     ARE KIT)                                                        



     Misc                                                        

 

     Insulin      -0      Yes       [id number] 12U       inject 12           

[facility]



     NPH-Regular      7-28                               Units           ity of



     Human Rec      00:00:                               under the           [facility]

as



     100 unit/mL      00                                 skin in           Medic

al



     (30)                                         the            [facility]



     injection                                         morning           



                                                  and 12           



                                                  Units in           



                                                  the            



                                                  evening.           

 

     ofloxacin      -0      Yes       [id number] 5[drp]      Place [facility]       

    [facility]



     0.3 % otic      7-28                32434           Drops in           ity 

of



     drops      00:00:                               both ears           [place]



               00                                 in the           Medical



                                                  morning           [facility]



                                                  and 5           



                                                  Drops in           



                                                  the            



                                                  evening.           

 

     pantoprazol      -0      Yes       [id number] 40mg      Take 1          

 [facility]



     e 40 mg EC      7-28                               tablet by           ity 

of



     tablet      00:00:                               mouth in           [place]



               00                                 the            Medical



                                                  morning.           Branch

 

     fenofibrate      -0      Yes       [id number] 134mg      Take 2         

  [facility]



     micronized      7-28                               capsules           ity o

f



     67 mg      00:00:                               by mouth           [place]



     capsule      00                                 in the           Medical



                                                  morning.           [facility]anch

 

     Blood-Gluco      -0      Yes       [id number]           Use as          

 [facility]



     se        7-28                               directed           ity of



     Meter,Yashira      00:00:                                              [place]



     nuous      00                                                Medical



     (DEXCOM G4                                                        [place]



     -SH                                                        



     ARE KIT)                                                        



     Misc                                                        

 

     Insulin      -0      Yes       [id number] 12U       inject 12           

[facility]



     NPH-Regular      7-28                               Units           ity of



     Human Rec      00:00:                               under the           [facility]

as



     100 unit/mL      00                                 skin in           Medic

al



     (70-30)                                         the            [facility]anch



     injection                                         morning           



                                                  and 12           



                                                  Units in           



                                                  the            



                                                  evening.           

 

     meloxicam      3-0      Yes       [id number] 15mg      Take 1           U

nivers



     15 mg      7-22                               tablet by           ity of



     tablet      00:00:                               mouth in           [place]



               00                                 the            Medical



                                                  morning.           Branch

 

     acetaminoph      2023-0      Yes       [id number] 650mg      Take 1         

  [facility]



     en (TYLENOL      7-22                               tablet by           ity

 of



     ARTHRITIS      00:00:                               mouth           [place]



     PAIN) 650      00                                 every 8           Medical



     mg CR                                         (eight)           [facility]



     tablet                                         hours as           



                                                  needed for           



                                                  Pain.           

 

     meloxicam      2023-0      Yes       [id number] 15mg      Take 1           U

nivers



     15 mg      7-22                               tablet by           ity of



     tablet      00:00:                               mouth in           [place]



               00                                 the            Medical



                                                  morning.           Branch

 

     acetaminoph      3-0      Yes       [id number] 650mg      Take 1         

  [facility]



     en (TYLENOL      7-22                               tablet by           ity

 of



     ARTHRITIS      00:00:                               mouth           [place]



     PAIN) 650      00                                 every 8           Medical



     mg CR                                         (eight)           [facility]



     tablet                                         hours as           



                                                  needed for           



                                                  Pain.           

 

     meloxicam      3-0      Yes       [id number] 15mg      Take 1           U

nivers



     15 mg      7-22                               tablet by           ity of



     tablet      00:00:                               mouth in           [place]



               00                                 the            Medical



                                                  morning.           Branch

 

     acetaminoph      3-0      Yes       [id number] 650mg      Take 1         

  [facility]



     en (TYLENOL      7-22                               tablet by           ity

 of



     ARTHRITIS      00:00:                               mouth           [place]



     PAIN) 650      00                                 every 8           Medical



     mg CR                                         (eight)           [facility]



     tablet                                         hours as           



                                                  needed for           



                                                  Pain.           

 

     meloxicam      3-0      Yes       [id number] 15mg      Take 1           U

nivers



     15 mg      7-22                               tablet by           ity of



     tablet      00:00:                               mouth in           [place]



               00                                 the            Medical



                                                  morning.           Branch

 

     acetaminoph      3-0      Yes       [id number] 650mg      Take 1         

  [facility]



     en (TYLENOL      7-22                               tablet by           ity

 of



     ARTHRITIS      00:00:                               mouth           [place]



     PAIN) 650      00                                 every 8           Medical



     mg CR                                         (eight)           [facility]



     tablet                                         hours as           



                                                  needed for           



                                                  Pain.           

 

     meloxicam      2023-0      Yes       [id number] 15mg      Take 1           U

nivers



     15 mg      7-22                               tablet by           ity of



     tablet      00:00:                               mouth in           [place]



               00                                 the            Medical



                                                  morning.           Branch

 

     acetaminoph      2023-0      Yes       [id number] 650mg      Take 1         

  [facility]



     en (TYLENOL      7-22                               tablet by           ity

 of



     ARTHRITIS      00:00:                               mouth           [place]



     PAIN) 650      00                                 every 8           Medical



     mg CR                                         (eight)           [facility]



     tablet                                         hours as           



                                                  needed for           



                                                  Pain.           

 

     meloxicam      0      Yes       [id number] 15mg      Take 1           U

nivers



     15 mg      7-22                               tablet by           ity of



     tablet      00:00:                               mouth in           [place]



               00                                 the            Medical



                                                  morning.           Branch

 

     acetaminoph      0      Yes       [id number] 650mg      Take 1         

  [facility]



     en (TYLENOL      7-22                               tablet by           ity

 of



     ARTHRITIS      00:00:                               mouth           [place]



     PAIN) 650      00                                 every 8           Medical



     mg CR                                         (eight)           [facility]



     tablet                                         hours as           



                                                  needed for           



                                                  Pain.           

 

     acetaminoph      -0      Yes       [id number] 650mg      Take 1         

  [facility]



     en (TYLENOL      7-22                               tablet by           ity

 of



     ARTHRITIS      00:00:                               mouth           [place]



     PAIN) 650      00                                 every 8           Medical



     mg CR                                         (eight)           [facility]



     tablet                                         hours as           



                                                  needed for           



                                                  Pain.           

 

     acetaminoph      0      Yes       [id number] 650mg      Take 1         

  [facility]



     en (TYLENOL      7-22                               tablet by           ity

 of



     ARTHRITIS      00:00:                               mouth           [place]



     PAIN) 650      00                                 every 8           Medical



     mg CR                                         (eight)           [facility]



     tablet                                         hours as           



                                                  needed for           



                                                  Pain.           

 

     acetaminoph      0      Yes       [id number] 650mg      Take 1         

  [facility]



     en (TYLENOL      7-22                               tablet by           ity

 of



     ARTHRITIS      00:00:                               mouth           [place]



     PAIN) 650      00                                 every 8           Medical



     mg CR                                         (eight)           [facility]



     tablet                                         hours as           



                                                  needed for           



                                                  Pain.           

 

     meloxicam      2023- No        [id number] 15mg      Take 1           

[facility]



     15 mg      --03                          tablet by           ity of



     tablet      00:00: 00:00                          mouth in           [place]



               00   :00                           the            Medical



                                                  morning.           [facility]

 

     acetaminoph      2023- No             650mg      650 mg,           U

nivers



     en                                  Oral,           ity of



     (TYLENOL)      18:30: 19:03                          ONCE, 1           [place]

s



     tablet 650      00   :00                           dose, On           Medic

al



     mg                                           e            [place]



                                                  23 at           



                                                  1330, ASAP           

 

     ibuprofen      -0 - No             800mg      800 mg,           [facility]

vers



     (IBU)                                Oral,           ity of



     tablet 800      18:30: 19:03                          ONCE, 1           [facility]

as



     mg        00   :00                           dose, On           Medical



                                                  Tu            [place]



                                                  23 at           



                                                  1330, ASAP           

 

     ofloxacin      -0      Yes       [id number] 5[drp]      Place [facility]       

    [facility]



     0.3 % otic      7-11                82238           Drops in           ity 

of



     drops      00:00:                               both ears           [place]



               00                                 in the           Medical



                                                  morning           [facility]



                                                  and 5           



                                                  Drops in           



                                                  the            



                                                  evening.           

 

     traMADoL 50      -0      Yes       4647 50mg      Take 1           Un[facility]

ers



     mg tablet      7-11                               tablet by           ity o

f



               00:00:                               mouth           [place]



               00                                 every 6           Medical



                                                  (six)           [facility]



                                                  hours as           



                                                  needed           



                                                  (pain).           



                                                  Indication           



                                                  s: acute           



                                                  pain           

 

     ofloxacin      -0      Yes       [id number] 5[drp]      Place [facility]       

    [facility]



     0.3 % otic      7-11                57056           Drops in           ity 

of



     drops      00:00:                               both ears           [place]



               00                                 in the           Medical



                                                  morning           [facility]



                                                  and 5           



                                                  Drops in           



                                                  the            



                                                  evening.           

 

     traMADoL 50      -0      Yes       4647 50mg      Take 1           Un[facility]

ers



     mg tablet      7-11                               tablet by           ity o

f



               00:00:                               mouth           [place]



               00                                 every 6           Medical



                                                  (six)           [facility]



                                                  hours as           



                                                  needed           



                                                  (pain).           



                                                  Indication           



                                                  s: acute           



                                                  pain           

 

     ofloxacin      -0      Yes       [id number] 5[drp]      [facility]       

    [facility]



     0.3 % otic      7-11                85814           Drops in           ity 

of



     drops      00:00:                               both ears           [place]



               00                                 in the           Medical



                                                  morning           [facility]



                                                  and 5           



                                                  Drops in           



                                                  the            



                                                  evening.           

 

     traMADoL 50      -0      Yes       4647 50mg      Take 1           Un[facility]

ers



     mg tablet      7-11                               tablet by           ity o

f



               00:00:                               mouth           [place]



               00                                 every 6           Medical



                                                  (six)           [facility]



                                                  hours as           



                                                  needed           



                                                  (pain).           



                                                  Indication           



                                                  s: acute           



                                                  pain           

 

     traMADoL 50      -0      Yes       4647 50mg      Take 1           Un[facility]

ers



     mg tablet      7-11                               tablet by           ity o

f



               00:00:                               mouth           [place]



               00                                 every 6           Medical



                                                  (six)           [facility]



                                                  hours as           



                                                  needed           



                                                  (pain).           



                                                  Indication           



                                                  s: acute           



                                                  pain           

 

     traMADoL 50      -0      Yes       4647 50mg      Take 1           Un[facility]

ers



     mg tablet      7-11                               tablet by           ity o

f



               00:00:                               mouth           [place]



               00                                 every 6           Medical



                                                  (six)           [facility]



                                                  hours as           



                                                  needed           



                                                  (pain).           



                                                  Indication           



                                                  s: acute           



                                                  pain           

 

     traMADoL 50      -0      Yes       4647 50mg      Take 1           Un[facility]

ers



     mg tablet      7-11                               tablet by           ity o

f



               00:00:                               mouth           [place]



               00                                 every 6           Medical



                                                  (six)           [facility]



                                                  hours as           



                                                  needed           



                                                  (pain).           



                                                  Indication           



                                                  s: acute           



                                                  pain           

 

     traMADoL 50      -0      Yes       4647 50mg      Take 1           Un[facility]

ers



     mg tablet      7-11                               tablet by           ity o

f



               00:00:                               mouth           [place]



               00                                 every 6           Medical



                                                  (six)           [facility]



                                                  hours as           



                                                  needed           



                                                  (pain).           



                                                  Indication           



                                                  s: acute           



                                                  pain           

 

     traMADoL 50      -0      Yes       4647 50mg      Take 1           Un[facility]

ers



     mg tablet      7-                               tablet by           ity o

f



               00:00:                               mouth           [place]



               00                                 every 6           Medical



                                                  (six)           [facility]



                                                  hours as           



                                                  needed           



                                                  (pain).           



                                                  Indication           



                                                  s: acute           



                                                  pain           

 

     traMADoL 50      -0 - No        4647 50mg      Take 1           Uni

vers



     mg tablet       08-03                          tablet by           ity 

of



               00:00: 00:00                          mouth           [place]



               00   :00                           every 6           Medical



                                                  (six)           [facility]



                                                  hours as           



                                                  needed           



                                                  (pain).           



                                                  Indication           



                                                  s: acute           



                                                  pain           

 

     ofloxacin      2023- No        [id number] 5[drp]      [facility]      

     [facility]



     0.3 % otic                 46535           Drops in           ity

 of



     drops      00:00: 00:00                          both ears           [place]



               00   :00                           in the           Medical



                                                  morning           [facility]



                                                  and 5           



                                                  Drops in           



                                                  the            



                                                  evening.           

 

     ofloxacin      2023- No        [id number] 5[drp]      [facility]      

     [facility]



     0.3 % otic                 18457           Drops in           ity

 of



     drops      00:00: 00:00                          both ears           [place]



               00   :00                           in the           Medical



                                                  morning           [facility]



                                                  and 5           



                                                  Drops in           



                                                  the            



                                                  evening.           

 

     ofloxacin      2023- No        [id number] 5[drp]      [facility]      

     [facility]



     0.3 % otic                 12456           Drops in           ity

 of



     drops      00:00: 00:00                          both ears           [place]



               00   :00                           in the           Medical



                                                  morning           [facility]



                                                  and 5           



                                                  Drops in           



                                                  the            



                                                  evening.           

 

     ofloxacin      2023- Yes       [id number] 5[drp]      Place [facility]         

  [facility]



     0.3 % otic      -16                          Drops in           ity 

of



     drops      00:00: 04:59                          both ears           [place]



               00   :00                           in the           Medical



                                                  morning           [facility]



                                                  and 5           



                                                  Drops in           



                                                  the            



                                                  evening.           



                                                  Do all           



                                                  this for           



                                                  10 days.           

 

     ofloxacin      2023- Yes       [id number] 5[drp]      Place [facility]         

  [facility]



     0.3 % otic      -16                          Drops in           ity 

of



     drops      00:00: 04:59                          both ears           [place]



               00   :00                           in the           Medical



                                                  morning           [facility]



                                                  and 5           



                                                  Drops in           



                                                  the            



                                                  evening.           



                                                  Do all           



                                                  this for           



                                                  10 days.           

 

     ofloxacin      2023- Yes       [id number] 5[drp]      [facility]         

  [facility]



     0.3 % otic      7-05 07-16                          Drops in           ity 

of



     drops      00:00: 04:59                          both ears           [place]



               00   :00                           in the           Medical



                                                  morning           [facility]



                                                  and 5           



                                                  Drops in           



                                                  the            



                                                  evening.           



                                                  Do all           



                                                  this for           



                                                  10 days.           

 

     ofloxacin      2023- Yes       [id number] 5[drp]      [facility]         

  [facility]



     0.3 % otic      7-05 07-16                          Drops in           ity 

of



     drops      00:00: 04:59                          both ears           [place]



               00   :00                           in the           Medical



                                                  morning           [facility]



                                                  and 5           



                                                  Drops in           



                                                  the            



                                                  evening.           



                                                  Do all           



                                                  this for           



                                                  10 days.           

 

     ofloxacin      2023- Yes       [id number] 5[drp]      [facility]         

  [facility]



     0.3 % otic      7-05 07-16                          Drops in           ity 

of



     drops      00:00: 04:59                          both ears           [place]



               00   :00                           in the           Medical



                                                  morning           [facility]



                                                  and 5           



                                                  Drops in           



                                                  the            



                                                  evening.           



                                                  Do all           



                                                  this for           



                                                  10 days.           

 

     Insulin            Yes       [id number] 10U       inject 10           

[facility]



     NPH-Regular      5-30                               Units           ity of



     Human Rec      00:00:                               under the           [facility]

as



     100 unit/mL      00                                 skin in           Medic

al



     (30)                                         the            [facility]



     injection                                         morning           



                                                  and 10           



                                                  Units in           



                                                  the            



                                                  evening.           

 

     Insulin            Yes       [id number] 10U       inject 10           

[facility]



     NPH-Regular      5-30                               Units           ity of



     Human Rec      00:00:                               under the           [facility]

as



     100 unit/mL      00                                 skin in           Medic

al



     (30)                                         the            [facility]



     injection                                         morning           



                                                  and 10           



                                                  Units in           



                                                  the            



                                                  evening.           

 

     Insulin            Yes       [id number] 10U       inject 10           

[facility]



     NPH-Regular      5-30                               Units           ity of



     Human Rec      00:00:                               under the           [facility]

as



     100 unit/mL      00                                 skin in           Medic

al



     (30)                                         the            [facility]



     injection                                         morning           



                                                  and 10           



                                                  Units in           



                                                  the            



                                                  evening.           

 

     Insulin            Yes       [id number] 10U       inject 10           

[facility]



     NPH-Regular      5-30                               Units           ity of



     Human Rec      00:00:                               under the           [facility]

as



     100 unit/mL      00                                 skin in           Medic

al



     (30)                                         the            [facility]



     injection                                         morning           



                                                  and 10           



                                                  Units in           



                                                  the            



                                                  evening.           

 

     Insulin            Yes       [id number] 10U       inject 10           

[facility]



     NPH-Regular      5-30                               Units           ity of



     Human Rec      00:00:                               under the           [facility]

as



     100 unit/mL      00                                 skin in           Medic

al



     (30)                                         the            [facility]



     injection                                         morning           



                                                  and 10           



                                                  Units in           



                                                  the            



                                                  evening.           

 

     Insulin            Yes       [id number] 10U       inject 10           

[facility]



     NPH-Regular      5-30                               Units           ity of



     Human Rec      00:00:                               under the           [facility]

as



     100 unit/mL      00                                 skin in           Medic

al



     (70-30)                                         the            [facility]



     injection                                         morning           



                                                  and 10           



                                                  Units in           



                                                  the            



                                                  evening.           

 

     Insulin            Yes       [id number] 10U       inject 10           

[facility]



     NPH-Regular      5-30                               Units           ity of



     Human Rec      00:00:                               under the           [facility]

as



     100 unit/mL      00                                 skin in           Medic

al



     (70-30)                                         the            [facility]



     injection                                         morning           



                                                  and 10           



                                                  Units in           



                                                  the            



                                                  evening.           

 

     Insulin            Yes       [id number] 10U       inject 10           

[facility]



     NPH-Regular      5-30                               Units           ity of



     Human Rec      00:00:                               under the           [facility]

as



     100 unit/mL      00                                 skin in           Medic

al



     (30)                                         the            [facility]



     injection                                         morning           



                                                  and 10           



                                                  Units in           



                                                  the            



                                                  evening.           

 

     Insulin            Yes       [id number] 10U       inject 10           

[facility]



     NPH-Regular      5-30                               Units           ity of



     Human Rec      00:00:                               under the           [facility]

as



     100 unit/mL      00                                 skin in           Medic

al



     (30)                                         the            [facility]



     injection                                         morning           



                                                  and 10           



                                                  Units in           



                                                  the            



                                                  evening.           

 

     Insulin            Yes       [id number] 10U       inject 10           

[facility]



     NPH-Regular      5-30                               Units           ity of



     Human Rec      00:00:                               under the           [facility]

as



     100 unit/mL      00                                 skin in           Medic

al



     (30)                                         the            [facility]



     injection                                         morning           



                                                  and 10           



                                                  Units in           



                                                  the            



                                                  evening.           

 

     Insulin            Yes       [id number] 10U       inject 10           

[facility]



     NPH-Regular      5-30                               Units           ity of



     Human Rec      00:00:                               under the           [facility]

as



     100 unit/mL      00                                 skin in           Medic

al



     (30)                                         the            [facility]



     injection                                         morning           



                                                  and 10           



                                                  Units in           



                                                  the            



                                                  evening.           

 

     Insulin            Yes       [id number] 10U       inject 10           

[facility]



     NPH-Regular      5-30                               Units           ity of



     Human Rec      00:00:                               under the           [facility]

as



     100 unit/mL      00                                 skin in           Medic

al



     (70-30)                                         the            [facility]



     injection                                         morning           



                                                  and 10           



                                                  Units in           



                                                  the            



                                                  evening.           

 

     Insulin      2023- No        [id number] 10U       inject 10          

 [facility]



     NPH-Regular      5-30 07-28                          Units           ity of



     Human Rec      00:00: 00:00                          under the           [facility]

[place]



     100 unit/mL      00   :00                           skin in           Medic

al



     (70-30)                                         the            [facility]



     injection                                         morning           



                                                  and 10           



                                                  Units in           



                                                  the            



                                                  evening.           

 

     Insulin      2023- No        [id number] 10U       inject 10          

 [facility]



     NPH-Regular      5-30 07-28                          Units           ity of



     Human Rec      00:00: 00:00                          under the           [facility]

[place]



     100 unit/mL      00   :00                           skin in           Medic

al



     (70-30)                                         the            [facility]



     injection                                         morning           



                                                  and 10           



                                                  Units in           



                                                  the            



                                                  evening.           

 

     Insulin      2023- No        [id number] 10U       inject 10          

 [facility]



     NPH-Regular      5-30 07-28                          Units           ity of



     Human Rec      00:00: 00:00                          under the           [facility]

[facility]



     100 unit/mL      00   :00                           skin in           Medic

al



     (70-30)                                         the            [facility]



     injection                                         morning           



                                                  and 10           



                                                  Units in           



                                                  the            



                                                  evening.           

 

     ondansetron      2023- No             4mg       4 mg, Slow          

 [facility]



     (ZOFRAN      5-25 05-25                          IV Push,           ity of



     (PF))      03:30: 03:16                          ONCE, 1           [place]



     injection 4      00   :00                           dose, On           Medi

luis alfredo



     mg                                           Wed            [place]



                                                  23 at           



                                                  2230, ASAP           

 

     fenofibrate            Yes       [id number] 134mg      Take 2         

  [facility]



     micronized      5-03                               capsules           ity o

f



     67 mg      00:00:                               by mouth           [place]



     capsule      00                                 in the           Medical



                                                  morning.           [place]

 

     fenofibrate      0      Yes       [id number] 134mg      Take 2         

  [facility]



     micronized      5-03                               capsules           ity o

f



     67 mg      00:00:                               by mouth           [place]



     capsule      00                                 in the           Medical



                                                  morning.           [place]

 

     fenofibrate      0      Yes       [id number] 134mg      Take 2         

  [facility]



     micronized      5-03                               capsules           ity o

f



     67 mg      00:00:                               by mouth           [place]



     capsule      00                                 in the           Medical



                                                  morning.           [place]

 

     fenofibrate      0      Yes       [id number] 134mg      Take 2         

  [facility]



     micronized      5-03                               capsules           ity o

f



     67 mg      00:00:                               by mouth           [place]



     capsule      00                                 in the           Medical



                                                  morning.           [place]

 

     fenofibrate      0      Yes       [id number] 134mg      Take 2         

  [facility]



     micronized      5-03                               capsules           ity o

f



     67 mg      00:00:                               by mouth           [place]



     capsule      00                                 in the           Medical



                                                  morning.           [place]

 

     fenofibrate      0      Yes       [id number] 134mg      Take 2         

  [facility]



     micronized      5-03                               capsules           ity o

f



     67 mg      00:00:                               by mouth           [place]



     capsule      00                                 in the           Medical



                                                  morning.           [place]

 

     fenofibrate      -0      Yes       [id number] 134mg      Take 2         

  [facility]



     micronized      5-03                               capsules           ity o

f



     67 mg      00:00:                               by mouth           [place]



     capsule      00                                 in the           Medical



                                                  morning.           [place]

 

     fenofibrate      -0      Yes       [id number] 134mg      Take 2         

  [facility]



     micronized      5-03                               capsules           ity o

f



     67 mg      00:00:                               by mouth           [place]



     capsule      00                                 in the           Medical



                                                  morning.           [facility]

 

     fenofibrate      -0      Yes       [id number] 134mg      Take 2         

  [facility]



     micronized      5-03                               capsules           ity o

f



     67 mg      00:00:                               by mouth           [place]



     capsule      00                                 in the           Medical



                                                  morning.           [facility]

 

     fenofibrate      -0      Yes       [id number] 134mg      Take 2         

  [facility]



     micronized      5-03                               capsules           ity o

f



     67 mg      00:00:                               by mouth           [place]



     capsule      00                                 in the           Medical



                                                  morning.           [facility]

 

     fenofibrate      -0      Yes       [id number] 134mg      Take 2         

  [facility]



     micronized      5-03                               capsules           ity o

f



     67 mg      00:00:                               by mouth           [place]



     capsule      00                                 in the           Medical



                                                  morning.           [facility]

 

     fenofibrate      -0      Yes       [id number] 134mg      Take 2         

  [facility]



     micronized      5-03                               capsules           ity o

f



     67 mg      00:00:                               by mouth           [place]



     capsule      00                                 in the           Medical



                                                  morning.           [facility]

 

     fenofibrate      -0      Yes       [id number] 134mg      Take 2         

  [facility]



     micronized      5-03                               capsules           ity o

f



     67 mg      00:00:                               by mouth           [place]



     capsule      00                                 in the           Medical



                                                  morning.           [facility]

 

     fenofibrate      -0      Yes       [id number] 134mg      Take 2         

  [facility]



     micronized      5-03                               capsules           ity o

f



     67 mg      00:00:                               by mouth           [place]



     capsule      00                                 in the           Medical



                                                  morning.           [facility]

 

     fenofibrate      -0      Yes       [id number] 134mg      Take 2         

  [facility]



     micronized      5-03                               capsules           ity o

f



     67 mg      00:00:                               by mouth           [place]



     capsule      00                                 in the           Medical



                                                  morning.           [facility]

 

     fenofibrate      -0      Yes       [id number] 134mg      Take 2         

  [facility]



     micronized      5-03                               capsules           ity o

f



     67 mg      00:00:                               by mouth           [place]



     capsule      00                                 in the           Medical



                                                  morning.           [facility]

 

     fenofibrate      -0      Yes       [id number] 134mg      Take 2         

  [facility]



     micronized      5-03                               capsules           ity o

f



     67 mg      00:00:                               by mouth           [place]



     capsule      00                                 in the           Medical



                                                  morning.           [facility]

 

     fenofibrate      -0 3- No        [id number] 134mg      Take 2        

   [facility]



     micronized      5-03 07-28                          capsules           ity 

of



     67 mg      00:00: 00:00                          by mouth           [place]



     capsule      00   :00                           in the           Medical



                                                  morning.           [facility]

 

     fenofibrate      -0 3- No        [id number] 134mg      Take 2        

   [facility]



     micronized      5--28                          capsules           ity 

of



     67 mg      00:00: 00:00                          by mouth           [place]



     capsule      00   :00                           in the           Medical



                                                  morning.           [facility]

 

     fenofibrate      -0 2023- No        [id number] 134mg      Take 2        

   [facility]



     micronized      5--28                          capsules           ity 

of



     67 mg      00:00: 00:00                          by mouth           [place]



     capsule      00   :00                           in the           Medical



                                                  morning.           [facility]

 

     metFORMIN      2023-0      Yes       [id number] 1000mg      Take 1          

 [facility]



     1,000 mg      5-01                               tablet by           [facility]



     tablet      00:00:                               mouth in           [address]



                                                  and 1           



                                                  tablet in           



                                                  the            



                                                  evening.           



                                                  Take with           



                                                  meals.           

 

     metFORMIN      2023-0      Yes       [id number] 1000mg      Take 1          

 [facility]



     1,000 mg      5-01                               tablet by           [facility] of



     tablet      00:00:                               mouth in           [address]



                                                  and 1           



                                                  tablet in           



                                                  the            



                                                  evening.           



                                                  Take with           



                                                  meals.           

 

     metFORMIN      2023-0      Yes       [id number] 1000mg      Take 1          

 [facility]



     1,000 mg      5-01                               tablet by           [facility] of



     tablet      00:00:                               mouth in           [address]



                                                  and 1           



                                                  tablet in           



                                                  the            



                                                  evening.           



                                                  Take with           



                                                  meals.           

 

     metFORMIN      2023-0      Yes       [id number] 1000mg      Take 1          

 [facility]



     1,000 mg      5-01                               tablet by           ity of



     tablet      00:00:                               mouth in           [address]



                                                  and 1           



                                                  tablet in           



                                                  the            



                                                  evening.           



                                                  Take with           



                                                  meals.           

 

     metFORMIN      2023-0      Yes       [id number] 1000mg      Take 1          

 [facility]



     1,000 mg      5-01                               tablet by           ity of



     tablet      00:00:                               mouth in           [address]



                                                  and 1           



                                                  tablet in           



                                                  the            



                                                  evening.           



                                                  Take with           



                                                  meals.           

 

     metFORMIN      2023-0      Yes       [id number] 1000mg      Take 1          

 [facility]



     1,000 mg      5-01                               tablet by           ity of



     tablet      00:00:                               mouth in           [address]



                                                  and 1           



                                                  tablet in           



                                                  the            



                                                  evening.           



                                                  Take with           



                                                  meals.           

 

     metFORMIN      2023-0      Yes       [id number] 1000mg      Take 1          

 [facility]



     1,000 mg      5-01                               tablet by           [facility] of



     tablet      00:00:                               mouth in           [address]



                                                  and 1           



                                                  tablet in           



                                                  the            



                                                  evening.           



                                                  Take with           



                                                  meals.           

 

     metFORMIN      2023-0      Yes       [id number] 1000mg      Take 1          

 [facility]



     1,000 mg      5-01                               tablet by           ity of



     tablet      00:00:                               mouth in           [address]



                                                  and 1           



                                                  tablet in           



                                                  the            



                                                  evening.           



                                                  Take with           



                                                  meals.           

 

     metFORMIN      2023-0      Yes       [id number] 1000mg      Take 1          

 [facility]



     1,000 mg      5-01                               tablet by           [facility] of



     tablet      00:00:                               mouth in           [address]



                                                  and 1           



                                                  tablet in           



                                                  the            



                                                  evening.           



                                                  Take with           



                                                  meals.           

 

     metFORMIN      2023-0      Yes       [id number] 1000mg      Take 1          

 [facility]



     1,000 mg      5-01                               tablet by           [facility] of



     tablet      00:00:                               mouth in           [address]



                                                  and 1           



                                                  tablet in           



                                                  the            



                                                  evening.           



                                                  Take with           



                                                  meals.           

 

     metFORMIN      2023-0      Yes       [id number] 1000mg      Take 1          

 [facility]



     1,000 mg      5-01                               tablet by           [facility] of



     tablet      00:00:                               mouth in           [address]



                                                  and 1           



                                                  tablet in           



                                                  the            



                                                  evening.           



                                                  Take with           



                                                  meals.           

 

     metFORMIN      2023-0      Yes       [id number] 1000mg      Take 1          

 [facility]



     1,000 mg      5-01                               tablet by           [facility] of



     tablet      00:00:                               mouth in           [address]



                                                  and 1           



                                                  tablet in           



                                                  the            



                                                  evening.           



                                                  Take with           



                                                  meals.           

 

     metFORMIN      2023-0      Yes       [id number] 1000mg      Take 1          

 [facility]



     1,000 mg      5-01                               tablet by           [facility] of



     tablet      00:00:                               mouth in           [address]



                                                  and 1           



                                                  tablet in           



                                                  the            



                                                  evening.           



                                                  Take with           



                                                  meals.           

 

     metFORMIN      2023-0      Yes       [id number] 1000mg      Take 1          

 [facility]



     1,000 mg      5-01                               tablet by           [facility] of



     tablet      00:00:                               mouth in           [address]



                                                  and 1           



                                                  tablet in           



                                                  the            



                                                  evening.           



                                                  Take with           



                                                  meals.           

 

     metFORMIN      2023-0      Yes       [id number] 1000mg      Take 1          

 [facility]



     1,000 mg      5-01                               tablet by           [facility] of



     tablet      00:00:                               mouth in           [address]



                                                  and 1           



                                                  tablet in           



                                                  the            



                                                  evening.           



                                                  Take with           



                                                  meals.           

 

     metFORMIN      2023-0      Yes       [id number] 1000mg      Take 1          

 [facility]



     1,000 mg      5-01                               tablet by           [facility] of



     tablet      00:00:                               mouth in           [address]



                                                  and 1           



                                                  tablet in           



                                                  the            



                                                  evening.           



                                                  Take with           



                                                  meals.           

 

     metFORMIN      2023-0      Yes       [id number] 1000mg      Take 1          

 [facility]



     1,000 mg      5-01                               tablet by           [facility] of



     tablet      00:00:                               mouth in           [address]



                                                  and 1           



                                                  tablet in           



                                                  the            



                                                  evening.           



                                                  Take with           



                                                  meals.           

 

     metFORMIN      2023-0      Yes       [id number] 1000mg      Take 1          

 [facility]



     1,000 mg      5-01                               tablet by           [facility] of



     tablet      00:00:                               mouth in           [address]



                                                  and 1           



                                                  tablet in           



                                                  the            



                                                  evening.           



                                                  Take with           



                                                  meals.           

 

     metFORMIN      2023-0      Yes       [id number] 1000mg      Take 1          

 [facility]



     1,000 mg      5-01                               tablet by           [facility] of



     tablet      00:00:                               mouth in           [address]



                                                  and 1           



                                                  tablet in           



                                                  the            



                                                  evening.           



                                                  Take with           



                                                  meals.           

 

     metFORMIN      2023-0      Yes       [id number] 1000mg      Take 1          

 [facility]



     1,000 mg      5-01                               tablet by           [facility] of



     tablet      00:00:                               mouth in           [address]



                                                  and 1           



                                                  tablet in           



                                                  the            



                                                  evening.           



                                                  Take with           



                                                  meals.           

 

     metFORMIN      2023-0      Yes       [id number] 1000mg      Take 1          

 [facility]



     1,000 mg      5-01                               tablet by           [facility] of



     tablet      00:00:                               mouth in           [address]



                                                  and 1           



                                                  tablet in           



                                                  the            



                                                  evening.           



                                                  Take with           



                                                  meals.           

 

     metFORMIN      2023-0      Yes       [id number] 1000mg      Take 1          

 [facility]



     1,000 mg      5-01                               tablet by           ity of



     tablet      00:00:                               mouth in           [address]



                                                  and 1           



                                                  tablet in           



                                                  the            



                                                  evening.           



                                                  Take with           



                                                  meals.           

 

     metFORMIN      2023-0      Yes       [id number] 1000mg      Take 1          

 [facility]



     1,000 mg      5-01                               tablet by           ity of



     tablet      00:00:                               mouth in           [address]



                                                  and 1           



                                                  tablet in           



                                                  the            



                                                  evening.           



                                                  Take with           



                                                  meals.           

 

     metFORMIN      2023-0      Yes       [id number] 1000mg      Take 1          

 [facility]



     1,000 mg      5-01                               tablet by           ity of



     tablet      00:00:                               mouth in           [address]



                                                  and 1           



                                                  tablet in           



                                                  the            



                                                  evening.           



                                                  Take with           



                                                  meals.           

 

     metFORMIN      2023-0      Yes       [id number] 1000mg      Take 1          

 [facility]



     1,000 mg      5-01                               tablet by           ity of



     tablet      00:00:                               mouth in           [address]



                                                  and 1           



                                                  tablet in           



                                                  the            



                                                  evening.           



                                                  Take with           



                                                  meals.           

 

     metFORMIN      3-0      Yes       [id number] 1000mg      Take 1          

 [facility]



     1,000 mg      5-01                               tablet by           ity of



     tablet      00:00:                               mouth in           [address]



                                                  and 1           



                                                  tablet in           



                                                  the            



                                                  evening.           



                                                  Take with           



                                                  meals.           

 

     NaCl 0.9%      -0 3- No             1000mL      at 999           [facility]

vers



     (NS) bolus      4-25 04-25                          mL/hr,           [facility] of



     infusion      02:45: 04:18                          1,000 mL,           [facility]

as



     1,000 mL      00   :00                           IV             Medical



                                                  Infusion,           [facility]



                                                  ONCE, 1           



                                                  dose, On           



                                                  23 at           



                                                  2145, ASAP           

 

     clonazePAM      2023-0 2023- No             1mg       Take 1 mg           U

[facility]



     1 mg tablet                                by mouth           ity

 of



               18:32: 00:00                          in the           [place]



               35   :00                           morning           Medical



                                                  and 1 mg           [facility]



                                                  in the           



                                                  evening.           

 

     PANTOPRAZOL      3-0      Yes                      TAKE ONE           Un

[facility]



     E 40 mg EC      3-13                               TABLET BY           ity 

of



     tablet      00:00:                               [facility]



                                                DAILY           Medical



                                                                 Branch

 

     PANTOPRAZOL      3-0      Yes                      TAKE ONE           Un

[facility]



     E 40 mg EC      3-13                               TABLET BY           ity 

of



     tablet      00:00:                               [facility]



                                                DAILY           Medical



                                                                 Branch

 

     PANTOPRAZOL      3-0      Yes                      TAKE ONE           Un

[facility]



     E 40 mg EC      3-13                               TABLET BY           ity 

of



     tablet      00:00:                               [facility]



                                                DAILY           Medical



                                                                 Branch

 

     PANTOPRAZOL      3-0      Yes                      TAKE ONE           Un

[facility]



     E 40 mg EC      3-13                               TABLET BY           ity 

of



     tablet      00:00:                               [facility]



                                                DAILY           Medical



                                                                 Branch

 

     PANTOPRAZOL      3-0      Yes                      TAKE ONE           Un

[facility]



     E 40 mg EC      3-13                               TABLET BY           ity 

of



     tablet      00:00:                               [facility]



                                                DAILY           Medical



                                                                 Branch

 

     PANTOPRAZOL      3-0      Yes                      TAKE ONE           Un

[facility]



     E 40 mg EC      3-13                               TABLET BY           ity 

of



     tablet      00:00:                               [facility]



                                                DAILY           Medical



                                                                 Branch

 

     PANTOPRAZOL      3-0      Yes                      TAKE ONE           Un

[facility]



     E 40 mg EC      3-13                               TABLET BY           ity 

of



     tablet      00:00:                               [facility]



                                                DAILY           Medical



                                                                 Branch

 

     PANTOPRAZOL      3-0      Yes                      TAKE ONE           Un

[facility]



     E 40 mg EC      3-13                               TABLET BY           ity 

of



     tablet      00:00:                               [facility]



                                                DAILY           Medical



                                                                 Branch

 

     PANTOPRAZOL      3-0      Yes                      TAKE ONE           Un

[facility]



     E 40 mg EC      3-13                               TABLET BY           ity 

of



     tablet      00:00:                               [facility]



                                                DAILY           Medical



                                                                 Branch

 

     PANTOPRAZOL      3-0      Yes                      TAKE ONE           Un

[facility]



     E 40 mg EC      3-13                               TABLET BY           ity 

of



     tablet      00:00:                               [facility]



                                                DAILY           Medical



                                                                 Branch

 

     PANTOPRAZOL      3-0      Yes                      TAKE ONE           Un

[facility]



     E 40 mg EC      3-13                               TABLET BY           ity 

of



     tablet      00:00:                               [facility]



                                                DAILY           Medical



                                                                 Branch

 

     PANTOPRAZOL      3-0      Yes                      TAKE ONE           Un

[facility]



     E 40 mg EC      3-13                               TABLET BY           ity 

of



     tablet      00:00:                               M[facility]           [place]



               00                                 DAILY           Medical



                                                                 Branch

 

     PANTOPRAZOL      2023-0      Yes                      TAKE ONE           Un

[facility]



     E 40 mg EC      3-13                               TABLET BY           ity 

of



     tablet      00:00:                               [facility]           [place]



               00                                 DAILY           Medical



                                                                 Branch

 

     PANTOPRAZOL      2023-0      Yes                      TAKE ONE           Un

sugey



     E 40 mg EC      3-13                               TABLET BY           ity 

of



     tablet      00:00:                               [facility]           [place]



               00                                 DAILY           Medical



                                                                 Branch

 

     PANTOPRAZOL      2023-0      Yes                      TAKE ONE           Un

sugey



     E 40 mg EC      3-13                               TABLET BY           ity 

of



     tablet      00:00:                               [facility]           [place]



               00                                 DAILY           Medical



                                                                 Branch

 

     PANTOPRAZOL      2023-0      Yes                      TAKE ONE           Un

sugey



     E 40 mg EC      3-13                               TABLET BY           ity 

of



     tablet      00:00:                               [facility]           [place]



               00                                 DAILY           Medical



                                                                 Branch

 

     PANTOPRAZOL      2023-0      Yes                      TAKE ONE           Un

sugey



     E 40 mg EC      3-13                               TABLET BY           ity 

of



     tablet      00:00:                               [facility]           [place]



               00                                 DAILY           Medical



                                                                 Branch

 

     PANTOPRAZOL      2023-0      Yes                      TAKE ONE           Un

sugey



     E 40 mg EC      3-13                               TABLET BY           ity 

of



     tablet      00:00:                               [facility]           [place]



               00                                 DAILY           Medical



                                                                 Branch

 

     PANTOPRAZOL      2023-0      Yes                      TAKE ONE           Un

sugey



     E 40 mg EC      3-13                               TABLET BY           ity 

of



     tablet      00:00:                               [facility]           [place]



               00                                 DAILY           Medical



                                                                 Branch

 

     PANTOPRAZOL      2023-0      Yes                      TAKE ONE           Un

sugey



     E 40 mg EC      3-13                               TABLET BY           ity 

of



     tablet      00:00:                               [facility]           [place]



               00                                 DAILY           Medical



                                                                 Branch

 

     PANTOPRAZOL      2023-0      Yes                      TAKE ONE           Un

sugey



     E 40 mg EC      3-13                               TABLET BY           ity 

of



     tablet      00:00:                               [facility]           [place]



               00                                 DAILY           Medical



                                                                 Branch

 

     PANTOPRAZOL      2023-0      Yes                      TAKE ONE           Un

sugey



     E 40 mg EC      3-13                               TABLET BY           ity 

of



     tablet      00:00:                               [facility]           [place]



               00                                 DAILY           Medical



                                                                 Branch

 

     PANTOPRAZOL      2023-0      Yes                      TAKE ONE           Un

sugey



     E 40 mg EC      3-13                               TABLET BY           ity 

of



     tablet      00:00:                               [facility]           [place]



               00                                 DAILY           Medical



                                                                 Branch

 

     PANTOPRAZOL      2023-0      Yes                      TAKE ONE           Un

sugey



     E 40 mg EC      3-13                               TABLET BY           ity 

of



     tablet      00:00:                               [facility]           [place]



               00                                 DAILY           Medical



                                                                 Branch

 

     PANTOPRAZOL      2023-0      Yes                      TAKE ONE           Un

sugey



     E 40 mg EC      3-13                               TABLET BY           ity 

of



     tablet      00:00:                               [facility]           [place]



               00                                 DAILY           Medical



                                                                 Branch

 

     PANTOPRAZOL      2023-0      Yes                      TAKE ONE           Un

sugey



     E 40 mg EC      3-13                               TABLET BY           ity 

of



     tablet      00:00:                               [facility]           [place]



               00                                 DAILY           Medical



                                                                 Branch

 

     PANTOPRAZOL      -0      Yes                      TAKE ONE           Un

sugey



     E 40 mg EC      3-13                               TABLET BY           ity 

of



     tablet      00:00:                               MOUTH           [place]



               00                                 DAILY           Medical



                                                                 Branch

 

     PANTOPRAZOL      -0 - No                       TAKE ONE           U

nivers



     E 40 mg EC      3--                          TABLET BY           ity

 of



     tablet      00:00: 00:00                          MOUTH           [place]



               00   :                           DAILY           Medical



                                                                 Branch

 

     PANTOPRAZOL      -0 - No                       TAKE ONE           U

nivers



     E 40 mg EC      3-13 07-28                          TABLET BY           ity

 of



     tablet      00:00: 00:00                          MOUTH           [place]



               00   :00                           DAILY           Medical



                                                                 Branch

 

     PANTOPRAZOL      -0 - No                       TAKE ONE           U

nivers



     E 40 mg EC      3-13 07-28                          TABLET BY           ity

 of



     tablet      00:00: 00:00                          MOUTH           [place]



               00   :00                           DAILY           Medical



                                                                 Branch

 

     ziprasidone            Yes            20mg      20 mg,           Univ

ers



     (GEODON)      -11                               Oral, BID           ity of



     capsule 20      07:00:                               MEALS,           [place]



     mg        00                                 First dose           Medical



                                                  on Sat           Branch



                                                  23 at           



                                                  0100,           



                                                  Until           



                                                  Discontinu           



                                                  ed,            



                                                  Routine           

 

     LORazepam      2023- No             2mg       2 mg,           Univer

s



     (ATIVAN)      -                          Intramuscu           ity 

of



     injection 2      07:00: 06:49                          lar, ONCE,          

 [place]



     mg        00   :00                           1 dose, On           Medical



                                                  Sat            Branch



                                                  23 at           



                                                  0100, STAT           

 

     metFORMIN      -0      Yes            1000mg      1,000 mg,           U

nivers



     (GLUCOPHAGE      -                               Oral, BID           ity

 of



     ) tablet      14:00:                               MEALS,           [place]



     1,000 mg      00                                 First dose           Medic

al



                                                  on u           Branch



                                                  23 at           



                                                  0800,           



                                                  Until           



                                                  Discontinu           



                                                  ed,            



                                                  Routine           

 

     metFORMIN      -0 - No             1000mg      1,000 mg,           

[facility]



     (GLUCOPHAGE      -                          Oral,           ity of



     ) tablet      00:30: 23:57                          ONCE, 1           [place]



     1,000 mg      00   :00                           dose, On           Medical



                                                  Wed 23           Branch



                                                  at 1830,           



                                                  Routine           

 

     clonazePAM      -2023- No             1mg       1 mg,           Unive

rs



     (KLONOPIN)      2-08                          Oral,           ity of



     tablet 1 mg      00:30: 23:57                          ONCE, 1           [facility]

[place]



               00   :00                           dose, On           Medical



                                                  23           Branch



                                                  at 1830,           



                                                  Routine           

 

     insulin NPH      -0 2023- No             .1U/kg      8 Units           

[facility]



     (HUMULIN N)      2-08 02-08                          (rounded           ity

 of



     injection 8      23:45: 23:59                          from 8.22           

[place]



     Units      00   :00                           Units =           Medical



                                                  0.1            Branch



                                                  Units/kg           



                                                  ?82.2 kg),           



                                                  Subcutaneo           



                                                  us, ONCE,           



                                                  1 dose, On           



                                                  23           



                                                  at 1745,           



                                                  ASAP           

 

     nicotine      3-0      Yes            1{patch      1 Patch,           Un

[facility]



     (NICODERM)      2-08                     }         Topical,           ity o

f



     21 mg/24 hr      22:30:                               Administer           

[place]



     patch 1      00                                 over 24           Medical



     Patch                                         Hours,           Branch



                                                  Q24H,           



                                                  First dose           



                                                  on 23 at           



                                                  1630,           



                                                  Until           



                                                  Discontinu           



                                                  ed,            



                                                  Routine           

 

     clonazePAM      2023-0      Yes            1mg       Take 1 mg           Un

sugey



     1 mg tablet      2-08                               by mouth           ity 

of



               17:31:                               in the           [place]



               10                                 morning           Medical



                                                  and 1 mg           Branch



                                                  in the           



                                                  evening.           

 

     clonazePAM      2023-0      Yes            1mg       Take 1 mg           Un

su[facility]y



     1 mg tablet      2-08                               by mouth           ity 

of



               17:31:                               in the           [place]



               10                                 morning           Medical



                                                  and 1 mg           Branch



                                                  in the           



                                                  evening.           

 

     clonazePAM      2023-0      Yes            1mg       Take 1 mg           Un

sugey



     1 mg tablet      2-08                               by mouth           ity 

of



               17:31:                               in the           [place]



               10                                 morning           Medical



                                                  and 1 mg           Branch



                                                  in the           



                                                  evening.           

 

     clonazePAM      2023-0      Yes            1mg       Take 1 mg           Un

sugey



     1 mg tablet      2-08                               by mouth           ity 

of



               17:31:                               in the           [place]



               10                                 morning           Medical



                                                  and 1 mg           Branch



                                                  in the           



                                                  evening.           

 

     FENOFIBRATE      2023-0      Yes       [id number]           TAKE TWO        

   [facility]



     MICRONIZED      1-17                               CAPSULES           ity o

f



     67 mg      00:00:                               BY MOUTH           [place]



     capsule      00                                 EVERY           Medical



                                                  MORNING           Branch

 

     fenofibrate      2023-0      Yes       [id number] 134mg      Take 2         

  [facility]



     micronized      1-17                               capsules           ity o

f



     67 mg      00:00:                               by mouth           [place]



     capsule      00                                 in the           Medical



                                                  morning.           Branch

 

     fenofibrate      2023-0      Yes       [id number] 134mg      Take 2         

  [facility]



     micronized      1-17                               capsules           ity o

f



     67 mg      00:00:                               by mouth           [place]



     capsule      00                                 in the           Medical



                                                  morning.           Branch

 

     fenofibrate      2023-0      Yes       [id number] 134mg      Take 2         

  [facility]



     micronized      1-17                               capsules           ity o

f



     67 mg      00:00:                               by mouth           [place]



     capsule      00                                 in the           Medical



                                                  morning.           Branch

 

     fenofibrate      -0      Yes       [id number] 134mg      Take 2         

  [facility]



     micronized      1-17                               capsules           ity o

f



     67 mg      00:00:                               by mouth           [place]



     capsule      00                                 in the           Medical



                                                  morning.           Branch

 

     fenofibrate      -0      Yes       [id number] 134mg      Take 2         

  [facility]



     micronized      1-17                               capsules           ity o

f



     67 mg      00:00:                               by mouth           [place]



     capsule      00                                 in the           Medical



                                                  morning.           Branch

 

     fenofibrate      -0      Yes       [id number] 134mg      Take 2         

  [facility]



     micronized      1-17                               capsules           ity o

f



     67 mg      00:00:                               by mouth           [place]



     capsule      00                                 in the           Medical



                                                  morning.           Branch

 

     fenofibrate      -0      Yes       [id number] 134mg      Take 2         

  [facility]



     micronized      1-17                               capsules           ity o

f



     67 mg      00:00:                               by mouth           [place]



     capsule      00                                 in the           Medical



                                                  morning.           Branch

 

     fenofibrate      -0      Yes       [id number] 134mg      Take 2         

  [facility]



     micronized      1-17                               capsules           ity o

f



     67 mg      00:00:                               by mouth           [place]



     capsule      00                                 in the           Medical



                                                  morning.           Branch

 

     fenofibrate      -0      Yes       [id number] 134mg      Take 2         

  [facility]



     micronized      1-17                               capsules           ity o

f



     67 mg      00:00:                               by mouth           [place]



     capsule      00                                 in the           Medical



                                                  morning.           Branch

 

     fenofibrate      -0      Yes       [id number] 134mg      Take 2         

  [facility]



     micronized      1-17                               capsules           ity o

f



     67 mg      00:00:                               by mouth           [place]



     capsule      00                                 in the           Medical



                                                  morning.           Branch

 

     fenofibrate      -0      Yes       [id number] 134mg      Take 2         

  [facility]



     micronized      1-17                               capsules           ity o

f



     67 mg      00:00:                               by mouth           [place]



     capsule      00                                 in the           Medical



                                                  morning.           Branch

 

     fenofibrate      -0      Yes       [id number] 134mg      Take 2         

  [facility]



     micronized      1-17                               capsules           ity o

f



     67 mg      00:00:                               by mouth           [place]



     capsule      00                                 in the           Medical



                                                  morning.           Branch

 

     fenofibrate      -0      Yes       [id number] 134mg      Take 2         

  [facility]



     micronized      1-17                               capsules           ity o

f



     67 mg      00:00:                               by mouth           [place]



     capsule      00                                 in the           Medical



                                                  morning.           Branch

 

     fenofibrate      -0      Yes       [id number] 134mg      Take 2         

  [facility]



     micronized      1-17                               capsules           ity o

f



     67 mg      00:00:                               by mouth           [place]



     capsule      00                                 in the           Medical



                                                  morning.           Branch

 

     fenofibrate      -0      Yes       [id number] 134mg      Take 2         

  [facility]



     micronized      1-17                               capsules           ity o

f



     67 mg      00:00:                               by mouth           [place]



     capsule      00                                 in the           Medical



                                                  morning.           Branch

 

     fenofibrate      -0      Yes       [id number] 134mg      Take 2         

  [facility]



     micronized      1-17                               capsules           ity o

f



     67 mg      00:00:                               by mouth           [place]



     capsule      00                                 in the           Medical



                                                  morning.           Branch

 

     fenofibrate      -0      Yes       [id number] 134mg      Take 2         

  [facility]



     micronized      1-17                               capsules           ity o

f



     67 mg      00:00:                               by mouth           [place]



     capsule      00                                 in the           Medical



                                                  morning.           Branch

 

     fenofibrate      -0 3- No        [id number] 134mg      Take 2        

   [facility]



     micronized      1-17 05-03                          capsules           ity 

of



     67 mg      00:00: 00:00                          by mouth           [place]



     capsule      00   :00                           in the           Medical



                                                  morning.           Branch

 

     fenofibrate      -0 3- No        [id number] 134mg      Take 2        

   [facility]



     micronized      1-17 05-03                          capsules           ity 

of



     67 mg      00:00: 00:00                          by mouth           [place]



     capsule      00   :00                           in the           Medical



                                                  morning.           Branch

 

     FENOFIBRATE      -0 3- No        [id number]           TAKE TWO       

    [facility]



     MICRONIZED      1-17 01-17                          CAPSULES           ity 

of



     67 mg      00:00: 00:00                          BY MOUTH           [place]



     capsule      00   :00                           EVERY           Medical



                                                  MORNING           Branch

 

     NaCl 0.9%      2022 No             1000mL      at 999           Uni

vers



     (NS) bolus                                mL/hr,           ity of



     infusion      17:00: 16:30                          1,000 mL,           [facility]

as



     1,000 mL      00   :00                           IV             Medical



                                                  Infusion,           Branch



                                                  ONCE, 1           



                                                  dose, On           



                                                  22 at           



                                                  1100, STAT           

 

     magnesium      2022- No             2g        2 g, IV           [facility]

ers



     sulfate in                                Piggyback,           it

y of



     water 2      16:30: 16:55                          Administer           [facility]

as



     gram/50 mL      00   :00                           over 60           [facility]

l



     (4 %)                                         Minutes,           [facility]



     infusion 2                                         ONCE, 1           



     g                                            dose, On           



                                                  22 at           



                                                  1030,           



                                                  Routine           

 

     ondansetron      2022- No             4mg       4 mg, Slow          

 [facility]



     (ZOFRAN                                IV Push,           ity [facility]



     (PF))      15:45: 14:47                          ONCE, 1           [place]



     injection 4      00   :00                           dose, On           [facility]

luis alfredo



     mg                                           Wed            [facility]



                                                  22 at           



                                                  0945, ASAP           

 

     NaCl 0.9%      2022-              1000mL      at 999           [facility]

vers



     (NS) bolus      2-07 12-07                          mL/hr,           ity of



     infusion      15:45: 15:54                          1,000 mL,           [facility]

as



     1,000 mL      00   :00                           IV             [facility]



                                                  Infusion,           [facility]



                                                  ONCE, 1           



                                                  dose, On           



                                                  Wed            



                                                  22 at           



                                                  0945, STAT           

 

     ondansetron      2022      Yes       [id number] 4mg       Take 1           

[facility]



     4 mg      2-07                               tablet by           [facility] of



     disintegrat      00:00:                               mouth           [place]



     ing tablet      00                                 every 8           [facility]

l



                                                  (eight)           [facility]



                                                  hours as           



                                                  needed for           



                                                  Nausea and           



                                                  Vomiting           



                                                  (N/V).           

 

     ondansetron      2022      Yes       [id number] 4mg       Take 1           

[facility]



     4 mg      2-07                               tablet by           [facility] of



     disintegrat      00:00:                               mouth           [place]



     ing tablet      00                                 every 8           [facility]

l



                                                  (eight)           [facility]



                                                  hours as           



                                                  needed for           



                                                  Nausea and           



                                                  Vomiting           



                                                  (N/V).           

 

     ondansetron      2022      Yes       [id number] 4mg       Take 1           

[facility]



     4 mg      2-07                               tablet by           [facility] of



     disintegrat      00:00:                               mouth           [place]



     ing tablet      00                                 every 8           [facility]

l



                                                  (eight)           [facility]



                                                  hours as           



                                                  needed for           



                                                  Nausea and           



                                                  Vomiting           



                                                  (N/V).           

 

     ondansetron      2022      Yes       [id number] 4mg       Take 1           

[facility]



     4 mg      2-07                               tablet by           [facility] of



     disintegrat      00:00:                               mouth           [place]



     ing tablet      00                                 every 8           [facility]

l



                                                  (eight)           [facility]



                                                  hours as           



                                                  needed for           



                                                  Nausea and           



                                                  Vomiting           



                                                  (N/V).           

 

     ondansetron      2022      Yes       [id number] 4mg       Take 1           

[facility]



     4 mg      2-07                               tablet by           [facility] of



     disintegrat      00:00:                               mouth           [place]



     ing tablet      00                                 every 8           [facility]

l



                                                  (eight)           [facility]



                                                  hours as           



                                                  needed for           



                                                  Nausea and           



                                                  Vomiting           



                                                  (N/V).           

 

     ondansetron      2022      Yes       [id number] 4mg       Take 1           

[facility]



     4 mg      2-07                               tablet by           [facility] of



     disintegrat      00:00:                               mouth           [place]



     ing tablet      00                                 every 8           [facility]

l



                                                  (eight)           [facility]



                                                  hours as           



                                                  needed for           



                                                  Nausea and           



                                                  Vomiting           



                                                  (N/V).           

 

     ondansetron      2022      Yes       [id number] 4mg       Take 1           

[facility]



     4 mg      2-07                               tablet by           [facility] of



     disintegrat      00:00:                               mouth           [place]



     ing tablet      00                                 every 8           [facility]

l



                                                  (eight)           [facility]



                                                  hours as           



                                                  needed for           



                                                  Nausea and           



                                                  Vomiting           



                                                  (N/V).           

 

     ondansetron      2022      Yes       [id number] 4mg       Take 1           

[facility]



     4 mg      2-07                               tablet by           [facility] of



     disintegrat      00:00:                               mouth           [place]



     ing tablet      00                                 every 8           [facility]

l



                                                  (eight)           [facility]



                                                  hours as           



                                                  needed for           



                                                  Nausea and           



                                                  Vomiting           



                                                  (N/V).           

 

     ondansetron      2022      Yes       [id number] 4mg       Take 1           

[facility]



     4 mg      2-07                               tablet by           [facility] of



     disintegrat      00:00:                               mouth           [place]



     ing tablet      00                                 every 8           [facility]

l



                                                  (eight)           [facility]



                                                  hours as           



                                                  needed for           



                                                  Nausea and           



                                                  Vomiting           



                                                  (N/V).           

 

     ondansetron      2022      Yes       [id number] 4mg       Take 1           

[facility]



     4 mg      2-07                               tablet by           [facility] of



     disintegrat      00:00:                               mouth           [place]



     ing tablet      00                                 every 8           [facility]

l



                                                  (eight)           [facility]



                                                  hours as           



                                                  needed for           



                                                  Nausea and           



                                                  Vomiting           



                                                  (N/V).           

 

     ondansetron      2022      Yes       [id number] 4mg       Take 1           

[facility]



     4 mg      2-07                               tablet by           [facility] of



     disintegrat      00:00:                               mouth           [place]



     ing tablet      00                                 every 8           [facility]

l



                                                  (eight)           [facility]



                                                  hours as           



                                                  needed for           



                                                  Nausea and           



                                                  Vomiting           



                                                  (N/V).           

 

     ondansetron      2022      Yes       [id number] 4mg       Take 1           

[facility]



     4 mg      2-07                               tablet by           [facility] of



     disintegrat      00:00:                               mouth           [place]



     ing tablet      00                                 every 8           [facility]

l



                                                  (eight)           [facility]



                                                  hours as           



                                                  needed for           



                                                  Nausea and           



                                                  Vomiting           



                                                  (N/V).           

 

     ondansetron      2022      Yes       [id number] 4mg       Take 1           

[facility]



     4 mg      2-07                               tablet by           [facility] of



     disintegrat      00:00:                               mouth           [place]



     ing tablet      00                                 every 8           [facility]

l



                                                  (eight)           [facility]



                                                  hours as           



                                                  needed for           



                                                  Nausea and           



                                                  Vomiting           



                                                  (N/V).           

 

     ondansetron      2022      Yes       [id number] 4mg       Take 1           

[facility]



     4 mg      2-07                               tablet by           [facility] of



     disintegrat      00:00:                               mouth           [place]



     ing tablet      00                                 every 8           [facility]

l



                                                  (eight)           [facility]



                                                  hours as           



                                                  needed for           



                                                  Nausea and           



                                                  Vomiting           



                                                  (N/V).           

 

     ondansetron      2022      Yes       [id number] 4mg       Take 1           

[facility]



     4 mg      2-07                               tablet by           [facility] of



     disintegrat      00:00:                               mouth           [place]



     ing tablet      00                                 every 8           [facility]

l



                                                  (eight)           [facility]



                                                  hours as           



                                                  needed for           



                                                  Nausea and           



                                                  Vomiting           



                                                  (N/V).           

 

     ondansetron      2022      Yes       [id number] 4mg       Take 1           

[facility]



     4 mg      2-07                               tablet by           [facility] of



     disintegrat      00:00:                               mouth           [place]



     ing tablet      00                                 every 8           [facility]

l



                                                  (eight)           [facility]



                                                  hours as           



                                                  needed for           



                                                  Nausea and           



                                                  Vomiting           



                                                  (N/V).           

 

     ondansetron      2022      Yes       [id number] 4mg       Take 1           

[facility]



     4 mg      2-07                               tablet by           [facility] of



     disintegrat      00:00:                               mouth           [place]



     ing tablet      00                                 every 8           [facility]

l



                                                  (eight)           [facility]



                                                  hours as           



                                                  needed for           



                                                  Nausea and           



                                                  Vomiting           



                                                  (N/V).           

 

     ondansetron      2022      Yes       [id number] 4mg       Take 1           

[facility]



     4 mg      2-07                               tablet by           [facility] of



     disintegrat      00:00:                               mouth           [place]



     ing tablet      00                                 every 8           [facility]

l



                                                  (eight)           [facility]



                                                  hours as           



                                                  needed for           



                                                  Nausea and           



                                                  Vomiting           



                                                  (N/V).           

 

     ondansetron      2022      Yes       [id number] 4mg       Take 1           

[facility]



     4 mg      2-07                               tablet by           [facility] of



     disintegrat      00:00:                               mouth           [place]



     ing tablet      00                                 every 8           [facility]

l



                                                  (eight)           [facility]



                                                  hours as           



                                                  needed for           



                                                  Nausea and           



                                                  Vomiting           



                                                  (N/V).           

 

     ondansetron      2022      Yes       [id number] 4mg       Take 1           

[facility]



     4 mg      2-07                               tablet by           [facility] of



     disintegrat      00:00:                               mouth           [place]



     ing tablet      00                                 every 8           [facility]

l



                                                  (eight)           [facility]



                                                  hours as           



                                                  needed for           



                                                  Nausea and           



                                                  Vomiting           



                                                  (N/V).           

 

     ondansetron      2022      Yes       [id number] 4mg       Take 1           

[facility]



     4 mg      2-07                               tablet by           [facility] of



     disintegrat      00:00:                               mouth           [place]



     ing tablet      00                                 every 8           [facility]

l



                                                  (eight)           [facility]



                                                  hours as           



                                                  needed for           



                                                  Nausea and           



                                                  Vomiting           



                                                  (N/V).           

 

     ondansetron      2022      Yes       [id number] 4mg       Take 1           

[facility]



     4 mg      2-07                               tablet by           [facility] of



     disintegrat      00:00:                               mouth           [place]



     ing tablet      00                                 every 8           [facility]

l



                                                  (eight)           [facility]



                                                  hours as           



                                                  needed for           



                                                  Nausea and           



                                                  Vomiting           



                                                  (N/V).           

 

     ondansetron      2022      Yes       [id number] 4mg       Take 1           

[facility]



     4 mg      2-07                               tablet by           [facility] of



     disintegrat      00:00:                               mouth           [place]



     ing tablet      00                                 every 8           [facility]

l



                                                  (eight)           [facility]



                                                  hours as           



                                                  needed for           



                                                  Nausea and           



                                                  Vomiting           



                                                  (N/V).           

 

     ondansetron      2022      Yes       [id number] 4mg       Take 1           

[facility]



     4 mg      2-07                               tablet by           [facility] of



     disintegrat      00:00:                               mouth           [place]



     ing tablet      00                                 every 8           [facility]

l



                                                  (eight)           [facility]



                                                  hours as           



                                                  needed for           



                                                  Nausea and           



                                                  Vomiting           



                                                  (N/V).           

 

     ondansetron      2022      Yes       [id number] 4mg       Take 1           

[facility]



     4 mg      2-07                               tablet by           [facility] of



     disintegrat      00:00:                               mouth           [place]



     ing tablet      00                                 every 8           [facility]

l



                                                  (eight)           [facility]



                                                  hours as           



                                                  needed for           



                                                  Nausea and           



                                                  Vomiting           



                                                  (N/V).           

 

     ondansetron      2022      Yes       [id number] 4mg       Take 1           

[facility]



     4 mg      2-07                               tablet by           [facility] of



     disintegrat      00:00:                               mouth           [place]



     ing tablet      00                                 every 8           [facility]

l



                                                  (eight)           [facility]



                                                  hours as           



                                                  needed for           



                                                  Nausea and           



                                                  Vomiting           



                                                  (N/V).           

 

     ondansetron      2022      Yes       [id number] 4mg       Take 1           

[facility]



     4 mg      2-07                               tablet by           [facility] of



     disintegrat      00:00:                               mouth           [place]



     ing tablet      00                                 every 8           [facility]

l



                                                  (eight)           [facility]



                                                  hours as           



                                                  needed for           



                                                  Nausea and           



                                                  Vomiting           



                                                  (N/V).           

 

     ondansetron      2022      Yes       [id number] 4mg       Take 1           

[facility]



     4 mg      2-07                               tablet by           [facility] of



     disintegrat      00:00:                               mouth           [place]



     ing tablet      00                                 every 8           [facility]

l



                                                  (eight)           [facility]



                                                  hours as           



                                                  needed for           



                                                  Nausea and           



                                                  Vomiting           



                                                  (N/V).           

 

     ondansetron      2022      Yes       [id number] 4mg       Take 1           

[facility]



     4 mg      2-07                               tablet by           [facility] of



     disintegrat      00:00:                               mouth           [place]



     ing tablet      00                                 every 8           [facility]

l



                                                  (eight)           [facility]



                                                  hours as           



                                                  needed for           



                                                  Nausea and           



                                                  Vomiting           



                                                  (N/V).           

 

     ondansetron      2022      Yes       [id number] 4mg       Take 1           

[facility]



     4 mg      2-07                               tablet by           [facility] of



     disintegrat      00:00:                               mouth           [place]



     ing tablet      00                                 every 8           [facility]

l



                                                  (eight)           [facility]



                                                  hours as           



                                                  needed for           



                                                  Nausea and           



                                                  Vomiting           



                                                  (N/V).           

 

     ondansetron      2022      Yes       [id number] 4mg       Take 1           

[facility]



     4 mg      2-07                               tablet by           [facility] of



     disintegrat      00:00:                               mouth           [place]



     ing tablet      00                                 every 8           [facility]

l



                                                  (eight)           [facility]



                                                  hours as           



                                                  needed for           



                                                  Nausea and           



                                                  Vomiting           



                                                  (N/V).           

 

     ondansetron      2022      Yes       [id number] 4mg       Take 1           

[facility]



     4 mg      2-07                               tablet by           [facility] of



     disintegrat      00:00:                               mouth           [place]



     ing tablet      00                                 every 8           [facility]

l



                                                  (eight)           [facility]



                                                  hours as           



                                                  needed for           



                                                  Nausea and           



                                                  Vomiting           



                                                  (N/V).           

 

     ondansetron      2022      Yes       [id number] 4mg       Take 1           

[facility]



     4 mg      2-07                               tablet by           [facility] of



     disintegrat      00:00:                               mouth           [place]



     ing tablet      00                                 every 8           [facility]

l



                                                  (eight)           [facility]



                                                  hours as           



                                                  needed for           



                                                  Nausea and           



                                                  Vomiting           



                                                  (N/V).           

 

     ondansetron      2022      Yes       [id number] 4mg       Take 1           

[facility]



     4 mg      2-07                               tablet by           [facility] of



     disintegrat      00:00:                               mouth           [place]



     ing tablet      00                                 every 8           [facility]

l



                                                  (eight)           [facility]



                                                  hours as           



                                                  needed for           



                                                  Nausea and           



                                                  Vomiting           



                                                  (N/V).           

 

     ondansetron      2022      Yes       [id number] 4mg       Take 1           

[facility]



     4 mg      2-07                               tablet by           [facility] of



     disintegrat      00:00:                               mouth           [place]



     ing tablet      00                                 every 8           [facility]

l



                                                  (eight)           [facility]



                                                  hours as           



                                                  needed for           



                                                  Nausea and           



                                                  Vomiting           



                                                  (N/V).           

 

     ondansetron      2022      Yes       [id number] 4mg       Take 1           

[facility]



     4 mg      2-07                               tablet by           [facility] of



     disintegrat      00:00:                               mouth           [place]



     ing tablet      00                                 every 8           [facility]

l



                                                  (eight)           [facility]



                                                  hours as           



                                                  needed for           



                                                  Nausea and           



                                                  Vomiting           



                                                  (N/V).           

 

     ondansetron      2022      Yes       [id number] 4mg       Take 1           

[facility]



     4 mg      2-07                               tablet by           [facility]



     disintegrat      00:00:                               mouth           [place]



     ing tablet      00                                 every 8           [facility]

l



                                                  (eight)           [facility]



                                                  hours as           



                                                  needed for           



                                                  Nausea and           



                                                  Vomiting           



                                                  (N/V).           

 

     ondansetron      2022      Yes       [id number] 4mg       Take 1           

[facility]



     4 mg      2-07                               tablet by           [facility]



     disintegrat      00:00:                               mouth           [place]



     ing tablet      00                                 every 8           [facility]

l



                                                  (eight)           [facility]



                                                  hours as           



                                                  needed for           



                                                  Nausea and           



                                                  Vomiting           



                                                  (N/V).           

 

     ondansetron      2022      Yes       [id number] 4mg       Take 1           

[facility]



     4 mg      2-07                               tablet by           [facility]



     disintegrat      00:00:                               mouth           [place]



     ing tablet      00                                 every 8           [facility]

l



                                                  (eight)           [facility]



                                                  hours as           



                                                  needed for           



                                                  Nausea and           



                                                  Vomiting           



                                                  (N/V).           

 

     ondansetron      2022      Yes       [id number] 4mg       Take 1           

[facility]



     4 mg      2-07                               tablet by           [facility]



     disintegrat      00:00:                               mouth           [place]



     ing tablet      00                                 every 8           [facility]

l



                                                  (eight)           [facility]



                                                  hours as           



                                                  needed for           



                                                  Nausea and           



                                                  Vomiting           



                                                  (N/V).           

 

     ondansetron      -      Yes       [id number] 4mg       Take 1           

[facility]



     4 mg      2-07                               tablet by           [facility]



     disintegrat      00:00:                               mouth           [place]



     ing tablet      00                                 every 8           [facility]

l



                                                  (eight)           [facility]



                                                  hours as           



                                                  needed for           



                                                  Nausea and           



                                                  Vomiting           



                                                  (N/V).           

 

     ondansetron      -      Yes       [id number] 4mg       Take 1           

[facility]



     4 mg      2-07                               tablet by           [facility]



     disintegrat      00:00:                               mouth           [place]



     ing tablet      00                                 every 8           [facility]

l



                                                  (eight)           [facility]



                                                  hours as           



                                                  needed for           



                                                  Nausea and           



                                                  Vomiting           



                                                  (N/V).           

 

     ondansetron      - 2023- No        [id number] 4mg       Take 1          

 [facility]



     4 mg      2-07 08-03                          tablet by           [facility]



     disintegrat      00:00: 00:00                          mouth           [place]

[facility]



     ing tablet      00   :00                           every 8           [facility]

l



                                                  (eight)           [facility]



                                                  hours as           



                                                  needed for           



                                                  Nausea and           



                                                  Vomiting           



                                                  (N/V).           

 

     clonazePAM      -1      Yes            1mg       Take 1 mg           [facility]

[facility]



     1 mg tablet      0-28                               by mouth           [facility] 

of



               13:45:                               in the           [place]



               55                                 morning           [facility]



                                                  and 1 mg           [facility]



                                                  in the           



                                                  evening.           

 

     clonazePAM      2022-1      Yes            1mg       Take 1 mg           [facility]

[facility]



     1 mg tablet      0-28                               by mouth           [facility] 

of



               13:45:                               in the           [place]



               55                                 morning           [facility]



                                                  and 1 mg           [facility]



                                                  in the           



                                                  evening.           

 

     clonazePAM      2022-1      Yes            1mg       Take 1 mg           [facility]

[facility]



     1 mg tablet      0-28                               by mouth           [facility] 

of



               13:45:                               in the           [place]



               55                                 morning           [facility]



                                                  and 1 mg           [facility]



                                                  in the           



                                                  evening.           

 

     clonazePAM      2022-1      Yes            1mg       Take 1 mg           [facility]

[facility]



     1 mg tablet      0-28                               by mouth           [facility] 

of



               13:45:                               in the           [place]



               55                                 morning           [facility]



                                                  and 1 mg           [facility]



                                                  in the           



                                                  evening.           

 

     clonazePAM      2022-1      Yes            1mg       Take 1 mg           [facility]

[facility]



     1 mg tablet      0-28                               by mouth           [facility] 

of



               13:45:                               in the           [place]



               55                                 morning           [facility]



                                                  and 1 mg           [facility]



                                                  in the           



                                                  evening.           

 

     clonazePAM      2022-1      Yes            1mg       Take 1 mg           [facility]

[facility]



     1 mg tablet      0-28                               by mouth           [facility] 

of



               13:45:                               in the           [place]



               55                                 morning           [facility]



                                                  and 1 mg           [facility]



                                                  in the           



                                                  evening.           

 

     clonazePAM      2022-1      Yes            1mg       Take 1 mg           [facility]

[facility]



     1 mg tablet      0-28                               by mouth           [facility] 

of



               13:45:                               in the           [place]



               55                                 morning           [facility]



                                                  and 1 mg           [facility]



                                                  in the           



                                                  evening.           

 

     clonazePAM      2022-1      Yes            1mg       Take 1 mg           [facility]

[facility]



     1 mg tablet      0-28                               by mouth           [facility] 

of



               13:45:                               in the           [place]



               55                                 morning           [facility]



                                                  and 1 mg           [facility]



                                                  in the           



                                                  evening.           

 

     clonazePAM      2022-1      Yes            1mg       Take 1 mg           [facility]

[facility]



     1 mg tablet      0-28                               by mouth           [facility] 

of



               13:45:                               in the           [place]



               55                                 morning           [facility]



                                                  and 1 mg           [facility]



                                                  in the           



                                                  evening.           

 

     clonazePAM      2022-1      Yes            1mg       Take 1 mg           [facility]

[facility]



     1 mg tablet      0-28                               by mouth           [facility] 

of



               13:45:                               in the           [place]



               55                                 morning           [facility]



                                                  and 1 mg           [facility]



                                                  in the           



                                                  evening.           

 

     clonazePAM      2022-1      Yes            1mg       Take 1 mg           [facility]

[facility]



     1 mg tablet      0-28                               by mouth           [facility] 

of



               13:45:                               in the           [place]



               55                                 morning           [facility]



                                                  and 1 mg           [facility]



                                                  in the           



                                                  evening.           

 

     clonazePAM      2022-1      Yes            1mg       Take 1 mg           [facility]

[facility]



     1 mg tablet      0-28                               by mouth           [facility] 

of



               13:45:                               in the           [place]



               55                                 morning           [facility]



                                                  and 1 mg           [facility]



                                                  in the           



                                                  evening.           

 

     clonazePAM      2022-1      Yes            1mg       Take 1 mg           [facility]

[facility]



     1 mg tablet      0-28                               by mouth           [facility] 

of



               13:45:                               in the           [place]



               55                                 morning           [facility]



                                                  and 1 mg           [facility]



                                                  in the           



                                                  evening.           

 

     clonazePAM      2022-1      Yes            1mg       Take 1 mg           [facility]

[facility]



     1 mg tablet      0-28                               by mouth           [facility] 

of



               13:45:                               in the           [place]



               55                                 morning           [facility]



                                                  and 1 mg           [facility]



                                                  in the           



                                                  evening.           

 

     clonazePAM      2022-1      Yes            1mg       Take 1 mg           [facility]

[facility]



     1 mg tablet      0-28                               by mouth           [facility] 

of



               13:45:                               in the           [place]



               55                                 morning           [facility]



                                                  and 1 mg           [facility]



                                                  in the           



                                                  evening.           

 

     clonazePAM      2022-1      Yes            1mg       Take 1 mg           [facility]

[facility]



     1 mg tablet      0-28                               by mouth           [facility] 

of



               13:45:                               in the           [place]



               55                                 morning           [facility]



                                                  and 1 mg           [facility]



                                                  in the           



                                                  evening.           

 

     clonazePAM      2022-1      Yes            1mg       Take 1 mg           [facility]

[facility]



     1 mg tablet      0-28                               by mouth           [facility] 

of



               13:45:                               in the           [place]



               55                                 morning           [facility]



                                                  and 1 mg           [facility]



                                                  in the           



                                                  evening.           

 

     clonazePAM      2022-1      Yes            1mg       Take 1 mg           [facility]

[facility]



     1 mg tablet      0-28                               by mouth           [facility] 

of



               13:45:                               in the           [place]



               55                                 morning           [facility]



                                                  and 1 mg           [facility]



                                                  in the           



                                                  evening.           

 

     Insulin      2022-1      Yes       [id number] 10U       inject 10           

[facility]



     NPH-Regular      0-28                               Units           [facility]



     Human Rec      00:00:                               under the           [facility]

as



     100 unit/mL      00                                 skin in           [facility]

al



     (30)                                         the            [facility]



     injection                                         morning           



                                                  and 10           



                                                  Units in           



                                                  the            



                                                  evening.           

 

     Insulin      2022      Yes       [id number] 10U       inject 10           

[facility]



     NPH-Regular      0-28                               Units           [facility]



     Human Rec      00:00:                               under the           [facility]

as



     100 unit/mL      00                                 skin in           [facility]

al



     (30)                                         the            [facility]



     injection                                         morning           



                                                  and 10           



                                                  Units in           



                                                  the            



                                                  evening.           

 

     Insulin      2022      Yes       [id number] 10U       inject 10           

[facility]



     NPH-Regular      0-28                               Units           [facility]



     Human Rec      00:00:                               under the           [facility]

as



     100 unit/mL      00                                 skin in           [facility]

al



     ()                                         the            [facility]



     injection                                         morning           



                                                  and 10           



                                                  Units in           



                                                  the            



                                                  evening.           

 

     Insulin      2022      Yes       [id number] 10U       inject 10           

[facility]



     NPH-Regular      0-28                               Units           [facility]



     Human Rec      00:00:                               under the           [facility]

as



     100 unit/mL      00                                 skin in           [facility]

al



     ()                                         the            [facility]



     injection                                         morning           



                                                  and 10           



                                                  Units in           



                                                  the            



                                                  evening.           

 

     Insulin      2022      Yes       [id number] 10U       inject 10           

[facility]



     NPH-Regular      0-28                               Units           ity of



     Human Rec      00:00:                               under the           [facility]

as



     100 unit/mL      00                                 skin in           Medic

al



     ()                                         the            [facility]



     injection                                         morning           



                                                  and 10           



                                                  Units in           



                                                  the            



                                                  evening.           

 

     Insulin      2022      Yes       [id number] 10U       inject 10           

[facility]



     NPH-Regular      0-28                               Units           ity of



     Human Rec      00:00:                               under the           [facility]

as



     100 unit/mL      00                                 skin in           Medic

al



     ()                                         the            [facility]



     injection                                         morning           



                                                  and 10           



                                                  Units in           



                                                  the            



                                                  evening.           

 

     Insulin      2022      Yes       [id number] 10U       inject 10           

[facility]



     NPH-Regular      0-28                               Units           ity of



     Human Rec      00:00:                               under the           [facility]

as



     100 unit/mL      00                                 skin in           Medic

al



     (30)                                         the            [facility]



     injection                                         morning           



                                                  and 10           



                                                  Units in           



                                                  the            



                                                  evening.           

 

     Insulin      2022      Yes       [id number] 10U       inject 10           

[facility]



     NPH-Regular      0-28                               Units           ity of



     Human Rec      00:00:                               under the           [facility]

as



     100 unit/mL      00                                 skin in           Medic

al



     (30)                                         the            [facility]



     injection                                         morning           



                                                  and 10           



                                                  Units in           



                                                  the            



                                                  evening.           

 

     Insulin      2022      Yes       [id number] 10U       inject 10           

[facility]



     NPH-Regular      0-28                               Units           ity of



     Human Rec      00:00:                               under the           [facility]

as



     100 unit/mL      00                                 skin in           Medic

al



     (70-30)                                         the            [facility]



     injection                                         morning           



                                                  and 10           



                                                  Units in           



                                                  the            



                                                  evening.           

 

     Insulin      2022      Yes       [id number] 10U       inject 10           

[facility]



     NPH-Regular      0-28                               Units           ity of



     Human Rec      00:00:                               under the           [facility]

as



     100 unit/mL      00                                 skin in           Medic

al



     (70-30)                                         the            [facility]



     injection                                         morning           



                                                  and 10           



                                                  Units in           



                                                  the            



                                                  evening.           

 

     Insulin      2022      Yes       [id number] 10U       inject 10           

[facility]



     NPH-Regular      0-28                               Units           ity of



     Human Rec      00:00:                               under the           [facility]

as



     100 unit/mL      00                                 skin in           Medic

al



     (7030)                                         the            [facility]



     injection                                         morning           



                                                  and 10           



                                                  Units in           



                                                  the            



                                                  evening.           

 

     Insulin      2022      Yes       [id number] 10U       inject 10           

[facility]



     NPH-Regular      0-28                               Units           ity of



     Human Rec      00:00:                               under the           [facility]

as



     100 unit/mL      00                                 skin in           Medic

al



     (30)                                         the            [facility]



     injection                                         morning           



                                                  and 10           



                                                  Units in           



                                                  the            



                                                  evening.           

 

     Insulin      2022      Yes       [id number] 10U       inject 10           

[facility]



     NPH-Regular      0-28                               Units           ity of



     Human Rec      00:00:                               under the           [facility]

as



     100 unit/mL      00                                 skin in           Medic

al



     (30)                                         the            [facility]



     injection                                         morning           



                                                  and 10           



                                                  Units in           



                                                  the            



                                                  evening.           

 

     Insulin      2022      Yes       [id number] 10U       inject 10           

[facility]



     NPH-Regular      0-28                               Units           ity of



     Human Rec      00:00:                               under the           [facility]

as



     100 unit/mL      00                                 skin in           Medic

al



     (7030)                                         the            [facility]



     injection                                         morning           



                                                  and 10           



                                                  Units in           



                                                  the            



                                                  evening.           

 

     Insulin      2022      Yes       [id number] 10U       inject 10           

[facility]



     NPH-Regular      0-28                               Units           ity of



     Human Rec      00:00:                               under the           [facility]

as



     100 unit/mL      00                                 skin in           Medic

al



     (70-30)                                         the            [facility]



     injection                                         morning           



                                                  and 10           



                                                  Units in           



                                                  the            



                                                  evening.           

 

     Insulin      2022      Yes       [id number] 10U       inject 10           

[facility]



     NPH-Regular      0-28                               Units           ity of



     Human Rec      00:00:                               under the           [facility]

as



     100 unit/mL      00                                 skin in           Medic

al



     (70-30)                                         the            [facility]



     injection                                         morning           



                                                  and 10           



                                                  Units in           



                                                  the            



                                                  evening.           

 

     Insulin      2022      Yes       [id number] 10U       inject 10           

[facility]



     NPH-Regular      0-28                               Units           ity of



     Human Rec      00:00:                               under the           [facility]

as



     100 unit/mL      00                                 skin in           Medic

al



     (30)                                         the            [facility]



     injection                                         morning           



                                                  and 10           



                                                  Units in           



                                                  the            



                                                  evening.           

 

     Insulin      2022      Yes       [id number] 10U       inject 10           

[facility]



     NPH-Regular      0-28                               Units           ity of



     Human Rec      00:00:                               under the           [facility]

as



     100 unit/mL      00                                 skin in           Medic

al



     (30)                                         the            [facility]



     injection                                         morning           



                                                  and 10           



                                                  Units in           



                                                  the            



                                                  evening.           

 

     Insulin      2022      Yes       [id number] 10U       inject 10           

[facility]



     NPH-Regular      0-28                               Units           ity of



     Human Rec      00:00:                               under the           [facility]

as



     100 unit/mL      00                                 skin in           Medic

al



     (30)                                         the            [facility]



     injection                                         morning           



                                                  and 10           



                                                  Units in           



                                                  the            



                                                  evening.           

 

     Insulin      2022      Yes       [id number] 10U       inject 10           

[facility]



     NPH-Regular      0-28                               Units           ity of



     Human Rec      00:00:                               under the           [facility]

as



     100 unit/mL      00                                 skin in           Medic

al



     (30)                                         the            [facility]



     injection                                         morning           



                                                  and 10           



                                                  Units in           



                                                  the            



                                                  evening.           

 

     Insulin      2022      Yes       [id number] 10U       inject 10           

[facility]



     NPH-Regular      0-28                               Units           ity of



     Human Rec      00:00:                               under the           [facility]

as



     100 unit/mL      00                                 skin in           Medic

al



     (30)                                         the            [facility]



     injection                                         morning           



                                                  and 10           



                                                  Units in           



                                                  the            



                                                  evening.           

 

     Insulin      2022      Yes       [id number] 10U       inject 10           

[facility]



     NPH-Regular      0-28                               Units           ity of



     Human Rec      00:00:                               under the           [facility]

as



     100 unit/mL      00                                 skin in           Medic

al



     (30)                                         the            [facility]



     injection                                         morning           



                                                  and 10           



                                                  Units in           



                                                  the            



                                                  evening.           

 

     Insulin      2022      Yes       [id number] 10U       inject 10           

[facility]



     NPH-Regular      0-28                               Units           ity of



     Human Rec      00:00:                               under the           [facility]

as



     100 unit/mL      00                                 skin in           Medic

al



     (30)                                         the            [facility]



     injection                                         morning           



                                                  and 10           



                                                  Units in           



                                                  the            



                                                  evening.           

 

     Insulin      2022      Yes       [id number] 10U       inject 10           

[facility]



     NPH-Regular      0-28                               Units           ity of



     Human Rec      00:00:                               under the           [facility]

as



     100 unit/mL      00                                 skin in           Medic

al



     (30)                                         the            [facility]



     injection                                         morning           



                                                  and 10           



                                                  Units in           



                                                  the            



                                                  evening.           

 

     Insulin      2022      Yes       [id number] 10U       inject 10           

[facility]



     NPH-Regular      0-28                               Units           ity of



     Human Rec      00:00:                               under the           [facility]

as



     100 unit/mL      00                                 skin in           Medic

al



     (30)                                         the            [facility]



     injection                                         morning           



                                                  and 10           



                                                  Units in           



                                                  the            



                                                  evening.           

 

     Insulin      2022      Yes       [id number] 10U       inject 10           

[facility]



     NPH-Regular      0-28                               Units           ity of



     Human Rec      00:00:                               under the           [facility]

as



     100 unit/mL      00                                 skin in           Medic

al



     (30)                                         the            [facility]



     injection                                         morning           



                                                  and 10           



                                                  Units in           



                                                  the            



                                                  evening.           

 

     Insulin      2022      Yes       [id number] 10U       inject 10           

[facility]



     NPH-Regular      0-28                               Units           ity of



     Human Rec      00:00:                               under the           [facility]

as



     100 unit/mL      00                                 skin in           Medic

al



     (30)                                         the            [facility]



     injection                                         morning           



                                                  and 10           



                                                  Units in           



                                                  the            



                                                  evening.           

 

     Insulin      2022      Yes       [id number] 10U       inject 10           

[facility]



     NPH-Regular      0-28                               Units           ity of



     Human Rec      00:00:                               under the           [facility]

as



     100 unit/mL      00                                 skin in           Medic

al



     (30)                                         the            [facility]



     injection                                         morning           



                                                  and 10           



                                                  Units in           



                                                  the            



                                                  evening.           

 

     Insulin      2022      Yes       [id number] 10U       inject 10           

[facility]



     NPH-Regular      0-28                               Units           ity of



     Human Rec      00:00:                               under the           [facility]

as



     100 unit/mL      00                                 skin in           Medic

al



     ()                                         the            [facility]



     injection                                         morning           



                                                  and 10           



                                                  Units in           



                                                  the            



                                                  evening.           

 

     Insulin      2022      Yes       [id number] 10U       inject 10           

[facility]



     NPH-Regular      0-28                               Units           ity of



     Human Rec      00:00:                               under the           [facility]

as



     100 unit/mL      00                                 skin in           Medic

al



     ()                                         the            [facility]



     injection                                         morning           



                                                  and 10           



                                                  Units in           



                                                  the            



                                                  evening.           

 

     Insulin      2022      Yes       [id number] 10U       inject 10           

[facility]



     NPH-Regular      0-28                               Units           ity of



     Human Rec      00:00:                               under the           [facility]

as



     100 unit/mL      00                                 skin in           Medic

al



     (30)                                         the            [facility]



     injection                                         morning           



                                                  and 10           



                                                  Units in           



                                                  the            



                                                  evening.           

 

     Insulin      2022      Yes       [id number] 10U       inject 10           

[facility]



     NPH-Regular      0-28                               Units           ity of



     Human Rec      00:00:                               under the           [facility]

as



     100 unit/mL      00                                 skin in           Medic

al



     (30)                                         the            [facility]



     injection                                         morning           



                                                  and 10           



                                                  Units in           



                                                  the            



                                                  evening.           

 

     Insulin      2022      Yes       [id number] 10U       inject 10           

[facility]



     NPH-Regular      0-28                               Units           ity of



     Human Rec      00:00:                               under the           [facility]

as



     100 unit/mL      00                                 skin in           Medic

al



     (30)                                         the            [facility]



     injection                                         morning           



                                                  and 10           



                                                  Units in           



                                                  the            



                                                  evening.           

 

     Insulin      2022- No        [id number] 10U       inject 10          

 [facility]



     NPH-Regular      0-28 05-30                          Units           ity of



     Human Rec      00:00: 00:00                          under the           [facility]

[facility]



     100 unit/mL      00   :00                           skin in           Medic

al



     (70-30)                                         the            [facility]



     injection                                         morning           



                                                  and 10           



                                                  Units in           



                                                  the            



                                                  evening.           

 

     Insulin      2022- No        [id number] 10U       inject 10          

 [facility]



     NPH-Regular      0-28 05-30                          Units           ity of



     Human Rec      00:00: 00:00                          under the           [facility]

[facility]



     100 unit/mL      00   :00                           skin in           Medic

al



     (70-30)                                         the            [facility]



     injection                                         morning           



                                                  and 10           



                                                  Units in           



                                                  the            



                                                  evening.           

 

     rosuvastati      2022- No        [id number] 10mg      Take 1          

 [facility]



     n 10 mg      0                          tablet by           [facility]



     tablet      00:00: 05:59                          mouth at           [place]



               00   :00                           bedtime           [facility]



                                                  for 90           [facility]



                                                  days.           

 

     rosuvastati      2022- No        [id number] 10mg      Take 1          

 [facility]



     n 10 mg                                tablet by           [facility]



     tablet      00:00: 05:59                          mouth at           [place]



               00   :00                           bedtime           [facility]



                                                  for 90           [facility]



                                                  days.           

 

     rosuvastati      2022- No        [id number] 10mg      Take 1          

 [facility]



     n 10 mg                                tablet by           [facility]



     tablet      00:00: 05:59                          mouth at           [place]



               00   :00                           bedtime           [facility]



                                                  for 90           [facility]



                                                  days.           

 

     rosuvastati      2022- No        [id number] 10mg      Take 1       

    [facility]



     n 10 mg                 9109           tablet by           [facility]



     tablet      00:00: 05:59                          mouth at           [place]



               00   :00                           bedtime           [facility]



                                                  for 90           [facility]



                                                  days.           

 

     rosuvastati      2022- No        [id number] 10mg      Take 1       

    [facility]



     n 10 mg      0           9109           tablet by           [facility]



     tablet      00:00: 05:59                          mouth at           [place]



               00   :00                           bedtime           [facility]



                                                  for 90           [facility]



                                                  days.           

 

     rosuvastati      2022- No        [id number] 10mg      Take 1       

    [facility]



     n 10 mg      0           9109           tablet by           [facility]



     tablet      00:00: 05:59                          mouth at           [place]



               00   :00                           bedtime           [facility]



                                                  for 90           [facility]



                                                  days.           

 

     rosuvastati      2022- No        [id number] 10mg      Take 1       

    [facility]



     n 10 mg      0           9109           tablet by           [facility]



     tablet      00:00: 05:59                          mouth at           [place]



               00   :00                           bedtime           [facility]



                                                  for 90           [facility]



                                                  days.           

 

     rosuvastati      2022- No        [id number] 10mg      Take 1       

    [facility]



     n 10 mg                 9109           tablet by           [facility]



     tablet      00:00: 05:59                          mouth at           [place]



               00   :00                           bedtime           [facility]



                                                  for 90           [facility]



                                                  days.           

 

     rosuvastati      2022- No        [id number] 10mg      Take 1       

    [facility]



     n 10 mg                 9109           tablet by           [facility]



     tablet      00:00: 05:59                          mouth at           [place]



               00   :00                           bedtime           [facility]



                                                  for 90           [facility]



                                                  days.           

 

     rosuvastati      2022- No        [id number] 10mg      Take 1       

    [facility]



     n 10 mg                 9109           tablet by           [facility]



     tablet      00:00: 05:59                          mouth at           [place]



               00   :00                           bedtime           [facility]



                                                  for 90           [facility]



                                                  days.           

 

     rosuvastati      2022- No        [id number] 10mg      Take 1       

    [facility]



     n 10 mg                 9109           tablet by           [facility]



     tablet      00:00: 05:59                          mouth at           [place]



               00   :00                           bedtime           [facility]



                                                  for 90           [facility]



                                                  days.           

 

     rosuvastati      2022- No        [id number] 10mg      Take 1       

    [facility]



     n 10 mg                 9109           tablet by           [facility]



     tablet      00:00: 05:59                          mouth at           [place]



               00   :00                           bedtime           Medical



                                                  for 90           [facility]



                                                  days.           

 

     rosuvastati      2022- No        [id number] 10mg      Take 1       

    [facility]



     n 10 mg                 9109           tablet by           [facility]



     tablet      00:00: 05:59                          mouth at           [place]



               00   :00                           bedtime           Medical



                                                  for 90           [facility]



                                                  days.           

 

     rosuvastati      2022- No        [id number] 10mg      Take 1       

    [facility]



     n 10 mg                 9109           tablet by           [facility]



     tablet      00:00: 05:59                          mouth at           [place]



               00   :00                           bedtime           Medical



                                                  for 90           [facility]



                                                  days.           

 

     fenofibrate            Yes       [id number] 134mg      Take 2         

  [facility]



     micronized      7-21                               capsules           ity o

f



     67 mg      00:00:                               by mouth           [place]



     capsule      00                                 in the           Medical



                                                  morning.           [facility]

 

     fenofibrate            Yes       [id number] 134mg      Take 2         

  [facility]



     micronized      7-21                               capsules           ity o

f



     67 mg      00:00:                               by mouth           [place]



     capsule      00                                 in the           [facility]



                                                  morning.           [facility]

 

     fenofibrate      -0      Yes       [id number] 134mg      Take 2         

  [facility]



     micronized      7-21                               capsules           ity o

f



     67 mg      00:00:                               by mouth           [place]



     capsule      00                                 in the           Medical



                                                  morning.           [facility]

 

     fenofibrate      -0      Yes       [id number] 134mg      Take 2         

  [facility]



     micronized      7-21                               capsules           ity o

f



     67 mg      00:00:                               by mouth           [place]



     capsule      00                                 in the           Medical



                                                  morning.           [facility]

 

     fenofibrate      -0      Yes       [id number] 134mg      Take 2         

  [facility]



     micronized      7-21                               capsules           ity o

f



     67 mg      00:00:                               by mouth           [place]



     capsule      00                                 in the           Medical



                                                  morning.           [facility]

 

     fenofibrate      -0      Yes       [id number] 134mg      Take 2         

  [facility]



     micronized      7-21                               capsules           ity o

f



     67 mg      00:00:                               by mouth           [place]



     capsule      00                                 in the           Medical



                                                  morning.           [facility]

 

     fenofibrate      -0      Yes       [id number] 134mg      Take 2         

  [facility]



     micronized      7-21                               capsules           ity o

f



     67 mg      00:00:                               by mouth           [place]



     capsule      00                                 in the           Medical



                                                  morning.           [facility]

 

     fenofibrate      -0      Yes       [id number] 134mg      Take 2         

  [facility]



     micronized      7-21                               capsules           ity o

f



     67 mg      00:00:                               by mouth           [place]



     capsule      00                                 in the           Medical



                                                  morning.           [facility]

 

     fenofibrate      -0      Yes       [id number] 134mg      Take 2         

  [facility]



     micronized      7-21                               capsules           ity o

f



     67 mg      00:00:                               by mouth           [place]



     capsule      00                                 in the           Medical



                                                  morning.           [facility]

 

     fenofibrate      -0      Yes       [id number] 134mg      Take 2         

  [facility]



     micronized      7-21                               capsules           ity o

f



     67 mg      00:00:                               by mouth           [place]



     capsule      00                                 in the           Medical



                                                  morning.           [facility]

 

     fenofibrate      -0      Yes       [id number] 134mg      Take 2         

  [facility]



     micronized      7-21                               capsules           ity o

f



     67 mg      00:00:                               by mouth           [place]



     capsule      00                                 in the           Medical



                                                  morning.           [facility]

 

     fenofibrate      -0      Yes       [id number] 134mg      Take 2         

  [facility]



     micronized      7-21                               capsules           ity o

f



     67 mg      00:00:                               by mouth           [place]



     capsule      00                                 in the           Medical



                                                  morning.           [facility]

 

     fenofibrate      -0      Yes       [id number] 134mg      Take 2         

  [facility]



     micronized      7-21                               capsules           ity o

f



     67 mg      00:00:                               by mouth           [place]



     capsule      00                                 in the           Medical



                                                  morning.           [facility]

 

     fenofibrate      -0      Yes       [id number] 134mg      Take 2         

  [facility]



     micronized      7-21                               capsules           ity o

f



     67 mg      00:00:                               by mouth           [place]



     capsule      00                                 in the           Medical



                                                  morning.           [facility]

 

     fenofibrate      0 2023- No        [id number] 134mg      Take 2        

   [facility]



     micronized      7-21 01-17                          capsules           [facility] 

of



     67 mg      00:00: 00:00                          by mouth           [place]



     capsule      00   :00                           in the           Medical



                                                  morning.           [facility]

 

     clonazePAM      -0      Yes            1mg       Take 1 mg           [facility]

[facility]



     1 mg tablet      7-18                               by mouth           [facility] 

of



               14:25:                               in the           [address]           Medical



                                                  and 1 mg           [facility]



                                                  in the           



                                                  evening.           

 

     clonazePAM      -0      Yes            1mg       Take 1 mg           [facility]

[facility]



     1 mg tablet      7-18                               by mouth           [facility] 

of



               14:25:                               in the           [address]           Medical



                                                  and 1 mg           [facility]



                                                  in the           



                                                  evening.           

 

     clonazePAM      -0      Yes            1mg       Take 1 mg           [facility]

[facility]



     1 mg tablet      7-18                               by mouth           [facility] 

of



               14:25:                               in the           [address]           Medical



                                                  and 1 mg           [facility]



                                                  in the           



                                                  evening.           

 

     clonazePAM      -0      Yes            1mg       Take 1 mg           [facility]

[facility]



     1 mg tablet      7-18                               by mouth           [facility] 

of



               14:25:                               in the           [address]           Medical



                                                  and 1 mg           [facility]



                                                  in the           



                                                  evening.           

 

     Insulin      -0      Yes       [id number] 8U        inject 8           U

[facility]



     NPH-Regular      5-13                               Units           [facility]



     Human Rec      00:00:                               under the           [facility]

as



     100 unit/mL      00                                 skin 2           [facility]

l



     (70-30)                                         (two)           [facility]



     injection                                         times           



                                                  daily.           

 

     Insulin      -0      Yes       [id number] 8U        inject 8           U

n[facility]



     NPH-Regular      5-13                               Units           ity of



     Human Rec      00:00:                               under the           [facility]

as



     100 unit/mL      00                                 skin 2           [facility]

l



     (70-30)                                         (two)           [facility]



     injection                                         times           



                                                  daily.           

 

     Insulin      -0      Yes       [id number] 8U        inject 8           U

n[facility]



     NPH-Regular      5-13                               Units           ity of



     Human Rec      00:00:                               under the           [facility]

as



     100 unit/mL      00                                 skin 2           [facility]

l



     (70-30)                                         (two)           [facility]



     injection                                         times           



                                                  daily.           

 

     Insulin      -0      Yes       [id number] 8U        inject 8           U

nivers



     NPH-Regular      5-13                               Units           ity of



     Human Rec      00:00:                               under the           [facility]

as



     100 unit/mL      00                                 skin 2           [facility]

l



     (70-30)                                         (two)           [facility]



     injection                                         times           



                                                  daily.           

 

     Insulin      2022- No        [id number] 8U        inject 8           

[facility]



     NPH-Regular      5-13 10-28                          Units           [facility]



     Human Rec      00:00: 00:00                          under the           [facility]

[facility]



     100 unit/mL      00   :00                           skin 2           [facility]

l



     (70-30)                                         (two)           [facility]



     injection                                         times           



                                                  daily.           

 

     Insulin      2022- No        [id number] 8U        inject 8           

[facility]



     NPH-Regular      5-13 10-28                          Units           ity [facility]



     Human Rec      00:00: 00:00                          under the           [facility]

[facility]



     100 unit/mL      00   :00                           skin 2           [facility]

l



     (70-30)                                         (two)           [facility]



     injection                                         times           



                                                  daily.           

 

     Insulin      2022- No        [id number] 8U        inject 8           

[facility]



     NPH-Regular      5-13 10-28                          Units           ity [facility]



     Human Rec      00:00: 00:00                          under the           [facility]

[facility]



     100 unit/mL      00   :00                           skin 2           [facility]

l



     (70-30)                                         (two)           [facility]



     injection                                         times           



                                                  daily.           

 

     Insulin      2022- No        [id number] 8U        inject 8           

[facility]



     NPH-Regular      5-13 10-28                          Units           ity of



     Human Rec      00:00: 00:00                          under the           [facility]

[facility]



     100 unit/mL      00   :00                           skin 2           [facility]

l



     (70-30)                                         (two)           [facility]



     injection                                         times           



                                                  daily.           

 

     Insulin      2022- No        [id number] 8U        inject 8           

[facility]



     NPH-Regular      5-13 10-28                          Units           ity of



     Human Rec      00:00: 00:00                          under the           [facility]

[facility]



     100 unit/mL      00   :00                           skin 2           [facility]

l



     (70-30)                                         (two)           [facility]



     injection                                         times           



                                                  daily.           

 

     Insulin      2022- No        [id number] 8U        inject 8           

[facility]



     NPH-Regular      5-13 10-28                          Units           [facility]



     Human Rec      00:00: 00:00                          under the           [facility]

[facility]



     100 unit/mL      00   :00                           skin 2           [facility]

l



     (70-30)                                         (two)           [facility]



     injection                                         times           



                                                  daily.           

 

     metFORMIN            Yes       [id number] 1000mg      Take 1          

 [facility]



     1,000 mg      4-11                               tablet by           [facility]



     tablet      00:00:                               mouth 2           [place]



               00                                 (two)           [facility]



                                                  times           [facility]



                                                  daily with           



                                                  meals.           

 

     metFORMIN      2022-0      Yes       [id number] 1000mg      Take 1          

 [facility]



     1,000 mg      4-11                               tablet by           [facility]



     tablet      00:00:                               mouth 2           [place]



               00                                 (two)           [facility]



                                                  times           [facility]



                                                  daily with           



                                                  meals.           

 

     metFORMIN      2022-0      Yes       [id number] 1000mg      Take 1          

 [facility]



     1,000 mg      4-11                               tablet by           [facility]



     tablet      00:00:                               mouth 2           [place]



               00                                 (two)           [facility]



                                                  times           [facility]



                                                  daily with           



                                                  meals.           

 

     metFORMIN      2022-0      Yes       [id number] 1000mg      Take 1          

 [facility]



     1,000 mg      4-11                               tablet by           [facility]



     tablet      00:00:                               mouth 2           [place]



               00                                 (two)           Medical



                                                  times           [facility]



                                                  daily with           



                                                  meals.           

 

     metFORMIN      2022-0      Yes       [id number] 1000mg      Take 1          

 [facility]



     1,000 mg      4-11                               tablet by           [facility]



     tablet      00:00:                               mouth 2           [place]



               00                                 (two)           Medical



                                                  times           [facility]



                                                  daily with           



                                                  meals.           

 

     metFORMIN      2022-0      Yes       [id number] 1000mg      Take 1          

 [facility]



     1,000 mg      4-11                               tablet by           [facility]



     tablet      00:00:                               mouth 2           [place]



               00                                 (two)           Medical



                                                  times           [facility]



                                                  daily with           



                                                  meals.           

 

     metFORMIN      2022-0      Yes       [id number] 1000mg      Take 1          

 [facility]



     1,000 mg      4-11                               tablet by           [facility]



     tablet      00:00:                               mouth 2           [place]



               00                                 (two)           Medical



                                                  times           [facility]



                                                  daily with           



                                                  meals.           

 

     metFORMIN      2022-0      Yes       [id number] 1000mg      Take 1          

 [facility]



     1,000 mg      4-11                               tablet by           [facility]



     tablet      00:00:                               mouth 2           [place]



               00                                 (two)           [facility]



                                                  times           [facility]



                                                  daily with           



                                                  meals.           

 

     metFORMIN      2022-0      Yes       [id number] 1000mg      Take 1          

 [facility]



     1,000 mg      4-11                               tablet by           [facility]



     tablet      00:00:                               mouth 2           [place]



               00                                 (two)           [facility]



                                                  times           [facility]



                                                  daily with           



                                                  meals.           

 

     metFORMIN      2022-0      Yes       [id number] 1000mg      Take 1          

 [facility]



     1,000 mg      4-11                               tablet by           [facility]



     tablet      00:00:                               mouth 2           [place]



               00                                 (two)           [facility]



                                                  times           [facility]



                                                  daily with           



                                                  meals.           

 

     metFORMIN      2022-0      Yes       [id number] 1000mg      Take 1          

 [facility]



     1,000 mg      4-11                               tablet by           [facility]



     tablet      00:00:                               mouth 2           [place]



               00                                 (two)           [facility]



                                                  times           [facility]



                                                  daily with           



                                                  meals.           

 

     metFORMIN      2022-0      Yes       [id number] 1000mg      Take 1          

 [facility]



     1,000 mg      4-11                               tablet by           [facility]



     tablet      00:00:                               mouth 2           [place]



               00                                 (two)           [facility]



                                                  times           [facility]



                                                  daily with           



                                                  meals.           

 

     metFORMIN      2022-0      Yes       [id number] 1000mg      Take 1          

 [facility]



     1,000 mg      4-11                               tablet by           [facility]



     tablet      00:00:                               mouth 2           [place]



               00                                 (two)           [facility]



                                                  times           [facility]



                                                  daily with           



                                                  meals.           

 

     metFORMIN      2022-0      Yes       [id number] 1000mg      Take 1          

 [facility]



     1,000 mg      4-11                               tablet by           [facility]



     tablet      00:00:                               mouth 2           [place]



               00                                 (two)           [facility]



                                                  times           [facility]



                                                  daily with           



                                                  meals.           

 

     metFORMIN      2022-0      Yes       [id number] 1000mg      Take 1          

 [facility]



     1,000 mg      4-11                               tablet by           [facility]



     tablet      00:00:                               mouth 2           [place]



               00                                 (two)           [facility]



                                                  times           [facility]



                                                  daily with           



                                                  meals.           

 

     metFORMIN      2022-0      Yes       [id number] 1000mg      Take 1          

 [facility]



     1,000 mg      4-11                               tablet by           [facility]



     tablet      00:00:                               mouth 2           [place]



               00                                 (two)           [facility]



                                                  times           [facility]



                                                  daily with           



                                                  meals.           

 

     metFORMIN      2022-0      Yes       [id number] 1000mg      Take 1          

 [facility]



     1,000 mg      4-11                               tablet by           [facility]



     tablet      00:00:                               mouth 2           [place]



               00                                 (two)           [facility]



                                                  times           [facility]



                                                  daily with           



                                                  meals.           

 

     metFORMIN      2022-0      Yes       [id number] 1000mg      Take 1          

 [facility]



     1,000 mg      4-11                               tablet by           [facility]



     tablet      00:00:                               mouth 2           [place]



               00                                 (two)           [facility]



                                                  times           [facility]



                                                  daily with           



                                                  meals.           

 

     metFORMIN      2022-0      Yes       [id number] 1000mg      Take 1          

 [facility]



     1,000 mg      4-11                               tablet by           [facility]



     tablet      00:00:                               mouth 2           [place]



               00                                 (two)           [facility]



                                                  times           [facility]



                                                  daily with           



                                                  meals.           

 

     metFORMIN      2022-0      Yes       [id number] 1000mg      Take 1          

 [facility]



     1,000 mg      4-11                               tablet by           [facility]



     tablet      00:00:                               mouth 2           [place]



               00                                 (two)           [facility]



                                                  times           [facility]



                                                  daily with           



                                                  meals.           

 

     metFORMIN      2022-0      Yes       [id number] 1000mg      Take 1          

 [facility]



     1,000 mg      4-11                               tablet by           [facility]



     tablet      00:00:                               mouth 2           [place]



               00                                 (two)           [facility]



                                                  times           [facility]



                                                  daily with           



                                                  meals.           

 

     metFORMIN      2022-0      Yes       [id number] 1000mg      Take 1          

 [facility]



     1,000 mg      4-11                               tablet by           [facility]



     tablet      00:00:                               mouth 2           [place]



               00                                 (two)           [facility]



                                                  times           [facility]



                                                  daily with           



                                                  meals.           

 

     metFORMIN      2022-0      Yes       [id number] 1000mg      Take 1          

 [facility]



     1,000 mg      4-11                               tablet by           [facility]



     tablet      00:00:                               mouth 2           [place]



               00                                 (two)           [facility]



                                                  times           [facility]



                                                  daily with           



                                                  meals.           

 

     metFORMIN      2022-0      Yes       [id number] 1000mg      Take 1          

 [facility]



     1,000 mg      4-11                               tablet by           [facility]



     tablet      00:00:                               mouth 2           [place]



               00                                 (two)           [facility]



                                                  times           [facility]



                                                  daily with           



                                                  meals.           

 

     metFORMIN      2022-0      Yes       [id number] 1000mg      Take 1          

 [facility]



     1,000 mg      4-11                               tablet by           [facility]



     tablet      00:00:                               mouth 2           [place]



               00                                 (two)           [facility]



                                                  times           [facility]



                                                  daily with           



                                                  meals.           

 

     metFORMIN      2022-0      Yes       [id number] 1000mg      Take 1          

 [facility]



     1,000 mg      4-11                               tablet by           [facility]



     tablet      00:00:                               mouth 2           [place]



               00                                 (two)           [facility]



                                                  times           [facility]



                                                  daily with           



                                                  meals.           

 

     metFORMIN      2022-0      Yes       [id number] 1000mg      Take 1          

 [facility]



     1,000 mg      4-11                               tablet by           [facility]



     tablet      00:00:                               mouth 2           [place]



               00                                 (two)           [facility]



                                                  times           [facility]



                                                  daily with           



                                                  meals.           

 

     metFORMIN      2022-0      Yes       [id number] 1000mg      Take 1          

 [facility]



     1,000 mg      4-11                               tablet by           [facility]



     tablet      00:00:                               mouth 2           [place]



               00                                 (two)           [facility]



                                                  times           [facility]



                                                  daily with           



                                                  meals.           

 

     metFORMIN      2022-0      Yes       [id number] 1000mg      Take 1          

 [facility]



     1,000 mg      4-11                               tablet by           [facility]



     tablet      00:00:                               mouth 2           [place]



               00                                 (two)           [facility]



                                                  times           [facility]



                                                  daily with           



                                                  meals.           

 

     metFORMIN      2022-0      Yes       [id number] 1000mg      Take 1          

 [facility]



     1,000 mg      4-11                               tablet by           [facility]



     tablet      00:00:                               mouth 2           [place]



               00                                 (two)           [facility]



                                                  times           [facility]



                                                  daily with           



                                                  meals.           

 

     metFORMIN      2022-0      Yes       [id number] 1000mg      Take 1          

 [facility]



     1,000 mg      4-11                               tablet by           [facility]



     tablet      00:00:                               mouth 2           [place]



               00                                 (two)           [facility]



                                                  times           [facility]



                                                  daily with           



                                                  meals.           

 

     metFORMIN      2022-0      Yes       [id number] 1000mg      Take 1          

 [facility]



     1,000 mg      4-11                               tablet by           [facility]



     tablet      00:00:                               mouth 2           [place]



               00                                 (two)           [facility]



                                                  times           [facility]



                                                  daily with           



                                                  meals.           

 

     metFORMIN      2022-0      Yes       [id number] 1000mg      Take 1          

 [facility]



     1,000 mg      4-11                               tablet by           [facility]



     tablet      00:00:                               mouth 2           [place]



               00                                 (two)           [facility]



                                                  times           [facility]



                                                  daily with           



                                                  meals.           

 

     metFORMIN      2022-0 2023- No        [id number] 1000mg      Take 1         

  [facility]



     1,000 mg      4-11 05-01                          tablet by           [facility] o

f



     tablet      00:00: 00:00                          mouth 2           [place]



               00   :00                           (two)           [facility]



                                                  times           [facility]



                                                  daily with           



                                                  meals.           

 

     fenofibrate      -0 - No        [id number] 134mg      Take 2        

   [facility]



     micronized      3-23 07-19                          capsules           [facility] 

of



     67 mg      00:00: 00:00                          by mouth           [place]



     capsule      00   :00                           daily.           [facility]



                                                                 [facility]

 

     MULTIVITAMI      -0 - No                       Take by           [facility]

[facility]



     N ORAL                                mouth           [facility]



               14:55: 00:00                          daily.           [place]



               45   :00                                          [facility]



                                                                 [facility]

 

     pantoprazol      2-0      Yes            40mg      Take 1           [facility]

[facility]



     e         1-19                               tablet by           [facility]



     (PROTONIX)      00:00:                               mouth           [place]



     40 mg EC      00                                 daily.           [facility]



     tablet                                                        [facility]

 

     pantoprazol      -0      Yes            40mg      Take 1           [facility]

[facility]



     e         1-19                               tablet by           [facility]



     (PROTONIX)      00:00:                               mouth           [place]



     40 mg EC      00                                 daily.           [facility]



     tablet                                                        [facility]

 

     pantoprazol      2-0      Yes            40mg      Take 1           [facility]

[facility]



     e         1-19                               tablet by           [facility]



     (PROTONIX)      00:00:                               mouth           [place]



     40 mg EC      00                                 daily.           [facility]



     tablet                                                        [facility]

 

     pantoprazol      -0      Yes            40mg      Take 1           [facility]

ers



     e         1-19                               tablet by           [facility]



     (PROTONIX)      00:00:                               mouth           [place]



     40 mg EC      00                                 daily.           [facility]



     tablet                                                        [facility]

 

     pantoprazol      2-0      Yes            40mg      Take 1           [facility]

ers



     e         1-19                               tablet by           [facility]



     (PROTONIX)      00:00:                               mouth           [place]



     40 mg EC      00                                 daily.           [facility]



     tablet                                                        [facility]

 

     pantoprazol      2-0      Yes            40mg      Take 1           [facility]

ers



     e         1-19                               tablet by           [facility]



     (PROTONIX)      00:00:                               mouth           [place]



     40 mg EC      00                                 daily.           [facility]



     tablet                                                        [facility]

 

     pantoprazol      2-0      Yes            40mg      Take 1           [facility]

ers



     e         1-19                               tablet by           [facility]



     (PROTONIX)      00:00:                               mouth           [place]



     40 mg EC      00                                 daily.           [facility]



     tablet                                                        [facility]

 

     pantoprazol      2022-0      Yes            40mg      Take 1           [facility]

ers



     e         1-19                               tablet by           [facility]



     (PROTONIX)      00:00:                               mouth           [place]



     40 mg EC      00                                 daily.           [facility]



     tablet                                                        [facility]

 

     pantoprazol      2-0      Yes            40mg      Take 1           [facility]

ers



     e         1-19                               tablet by           [facility]



     (PROTONIX)      00:00:                               mouth           [place]



     40 mg EC      00                                 daily.           [facility]



     tablet                                                        [facility]

 

     pantoprazol      2022-0      Yes            40mg      Take 1           [facility]

ers



     e         1-19                               tablet by           [facility]



     (PROTONIX)      00:00:                               mouth           [place]



     40 mg EC      00                                 daily.           [facility]



     tablet                                                        [facility]

 

     pantoprazol      2022-0      Yes            40mg      Take 1           [facility]

ers



     e         1-19                               tablet by           [facility]



     (PROTONIX)      00:00:                               mouth           [place]



     40 mg EC      00                                 daily.           [facility]



     tablet                                                        [facility]

 

     pantoprazol      2022-0      Yes            40mg      Take 1           [facility]

ers



     e         1-19                               tablet by           [facility]



     (PROTONIX)      00:00:                               mouth           [place]



     40 mg EC      00                                 daily.           [facility]



     tablet                                                        [facility]

 

     pantoprazol      2022-0      Yes            40mg      Take 1           [facility]

ers



     e         1-19                               tablet by           [facility]



     (PROTONIX)      00:00:                               mouth           [place]



     40 mg EC      00                                 daily.           [facility]



     tablet                                                        [facility]

 

     pantoprazol      2022-0      Yes            40mg      Take 1           [facility]

ers



     e         1-19                               tablet by           [facility]



     (PROTONIX)      00:00:                               mouth           [place]



     40 mg EC      00                                 daily.           [facility]



     tablet                                                        [facility]

 

     pantoprazol      2022-0      Yes            40mg      Take 1           [facility]

ers



     e         1-19                               tablet by           [facility]



     (PROTONIX)      00:00:                               mouth           [place]



     40 mg EC      00                                 daily.           [facility]



     tablet                                                        [facility]

 

     pantoprazol      2022-0      Yes            40mg      Take 1           [facility]

ers



     e         1-19                               tablet by           [facility]



     (PROTONIX)      00:00:                               mouth           [place]



     40 mg EC      00                                 daily.           [facility]



     tablet                                                        [facility]

 

     pantoprazol      2022-0      Yes            40mg      Take 1           [facility]

ers



     e         1-19                               tablet by           [facility]



     (PROTONIX)      00:00:                               mouth           [place]



     40 mg EC      00                                 daily.           [facility]



     tablet                                                        [facility]

 

     pantoprazol      2022-0      Yes            40mg      Take 1           [facility]

ers



     e         1-19                               tablet by           [facility]



     (PROTONIX)      00:00:                               mouth           [place]



     40 mg EC      00                                 daily.           [facility]



     tablet                                                        [facility]

 

     pantoprazol      2022-0      Yes            40mg      Take 1           [facility]

ers



     e         1-19                               tablet by           [facility]



     (PROTONIX)      00:00:                               mouth           [place]



     40 mg EC      00                                 daily.           [facility]



     tablet                                                        [facility]

 

     pantoprazol      2022-0      Yes            40mg      Take 1           [facility]

ers



     e         1-19                               tablet by           [facility]



     (PROTONIX)      00:00:                               mouth           [place]



     40 mg EC      00                                 daily.           [facility]



     tablet                                                        [facility]

 

     pantoprazol      2022-0      Yes            40mg      Take 1           [facility]

ers



     e         1-19                               tablet by           [facility]



     (PROTONIX)      00:00:                               mouth           [place]



     40 mg EC      00                                 daily.           [facility]



     tablet                                                        [facility]

 

     pantoprazol      2022-0      Yes            40mg      Take 1           [facility]

ers



     e         1-19                               tablet by           [facility]



     (PROTONIX)      00:00:                               mouth           [place]



     40 mg EC      00                                 daily.           [facility]



     tablet                                                        [facility]

 

     pantoprazol            Yes            40mg      Take 1           [facility]

ers



     e         1-19                               tablet by           [facility]



     (PROTONIX)      00:00:                               mouth           [place]



     40 mg EC      00                                 daily.           [facility]



     tablet                                                        [facility]

 

     pantoprazol            Yes            40mg      Take 1           [facility]

ers



     e         1-19                               tablet by           [facility]



     (PROTONIX)      00:00:                               mouth           [place]



     40 mg EC      00                                 daily.           [facility]



     tablet                                                        [facility]

 

     pantoprazol      2023- No             40mg      Take 1           [facility]

vers



     e         1-19 03-13                          tablet by           [facility]



     (PROTONIX)      00:00: 00:00                          mouth           [place]



     40 mg EC      00   :00                           daily.           [facility]



     tablet                                                        [facility]

 

     fenofibrate      2021 No        [id number] 134mg      Take 2        

   [facility]



     micronized      0-20 11-20                          capsules           [facility] 

of



     67 mg      00:00: 00:00                          by mouth           [place]



     capsule      00   :00                           daily.           [facility]



                                                                 [facility]

 

     metFORMIN      2021- No        [id number] 500mg      Take 1          

 [facility]



     500 mg      0-20 11-20                          tablet by           [facility]



     tablet      00:00: 00:00                          mouth 2           [place]



               00   :00                           (two)           [facility]



                                                  times           [facility]



                                                  daily with           



                                                  meals.           

 

     Insulin      2021- No        [id number] 5U        inject 5           

[facility]



     NPH-Regular      0-01 12-21                          Units           [facility]



     Human Rec      00:00: 00:00                          under the           [facility]

[place]



     100 unit/mL      00   :00                           skin 2           [facility]

l



     (70-30)                                         (two)           [facility]



     injection                                         times           



                                                  daily.           

 

     pantoprazol      2021- No        [id number] 40mg      Take 1          

 [facility]



     e         6-24 12-22                          tablet by           [facility]



     (PROTONIX)      00:00: 00:00                          mouth           [place]



     40 mg EC      00   :00                           daily.           [facility]



     tablet                                                        [facility]

 

     lipase-prot            Yes       [id number]           Take 1           

[facility]



     ease-amylas      5-15                               capsules           [facility] 

of



     e (CREON)      00:00:                               by mouth           [place]

[facility]



     36,000-114,      00                                 with meals           [facility]

dical



     000-                                         and 1 with           [facility]



     180,000                                         each           



     unit CpDR                                         snack.           

 

     lipase-prot            Yes       [id number]           Take 1           

[facility]



     ease-amylas      5-15                               capsules           [facility] 

of



     e (CREON)      00:00:                               by mouth           [place]

[facility]



     36,000-114,      00                                 with meals           Me

dical



     000-                                         and 1 with           [facility]



     180,000                                         each           



     unit CpDR                                         snack.           

 

     lipase-prot            Yes       [id number]           Take 1           

[facility]



     ease-amylas      5-15                               capsules           [facility] 

of



     e (CREON)      00:00:                               by mouth           [place]

[facility]



     36,000-114,      00                                 with meals           [facility]

dical



     000-                                         and 1 with           [facility]



     180,000                                         each           



     unit CpDR                                         snack.           

 

     lipase-prot            Yes       [id number]           Take 1           

[facility]



     ease-amylas      5-15                               capsules           [facility] 

of



     e (CREON)      00:00:                               by mouth           [place]

[facility]



     36,000-114,      00                                 with meals           Me

dical



     000-                                         and 1 with           [facility]



     180,000                                         each           



     unit CpDR                                         snack.           

 

     lipase-prot            Yes       [id number]           Take 1           

[facility]



     ease-amylas      5-15                               capsules           [facility] 

of



     e (CREON)      00:00:                               by mouth           [place]

[facility]



     36,000-114,      00                                 with meals           Me

dical



     000-                                         and 1 with           [facility]



     180,000                                         each           



     unit CpDR                                         snack.           

 

     lipase-prot            Yes       [id number]           Take 1           

[facility]



     ease-amylas      5-15                               capsules           [facility] 

of



     e (CREON)      00:00:                               by mouth           [place]

[facility]



     36,000-114,      00                                 with meals           Me

dical



     000-                                         and 1 with           [facility]



     180,000                                         each           



     unit CpDR                                         snack.           

 

     lipase-prot            Yes       [id number]           Take 1           

[facility]



     ease-amylas      5-15                               capsules           [facility] 

of



     e (CREON)      00:00:                               by mouth           [place]

[facility]



     36,000-114,      00                                 with meals           Me

dical



     000-                                         and 1 with           [facility]



     180,000                                         each           



     unit CpDR                                         snack.           

 

     lipase-prot            Yes       [id number]           Take 1           

[facility]



     ease-amylas      5-15                               capsules           [facility] 

of



     e (CREON)      00:00:                               by mouth           [place]

[facility]



     36,000-114,      00                                 with meals           Me

dical



     000-                                         and 1 with           [facility]



     180,000                                         each           



     unit CpDR                                         snack.           

 

     lipase-prot            Yes       [id number]           Take 1           

[facility]



     ease-amylas      5-15                               capsules           [facility] 

of



     e (CREON)      00:00:                               by mouth           [place]

[facility]



     36,000-114,      00                                 with meals           Me

dical



     000-                                         and 1 with           [facility]



     180,000                                         each           



     unit CpDR                                         snack.           

 

     lipase-prot            Yes       [id number]           Take 1           

[facility]



     ease-amylas      5-15                               capsules           [facility] 

of



     e (CREON)      00:00:                               by mouth           [place]

[facility]



     36,000-114,      00                                 with meals           Me

dical



     000-                                         and 1 with           [facility]



     180,000                                         each           



     unit CpDR                                         snack.           

 

     lipase-prot            Yes       [id number]           Take 1           

[facility]



     ease-amylas      5-15                               capsules           [facility] 

of



     e (CREON)      00:00:                               by mouth           [place]

[facility]



     36,000-114,      00                                 with meals           Me

dical



     000-                                         and 1 with           [facility]



     180,000                                         each           



     unit CpDR                                         snack.           

 

     lipase-prot            Yes       [id number]           Take 1           

[facility]



     ease-amylas      5-15                               capsules           [facility] 

of



     e (CREON)      00:00:                               by mouth           [place]

[facility]



     36,000-114,      00                                 with meals           Me

dical



     000-                                         and 1 with           [facility]



     180,000                                         each           



     unit CpDR                                         snack.           

 

     lipase-prot            Yes       [id number]           Take 1           

[facility]



     ease-amylas      5-15                               capsules           [facility] 

of



     e (CREON)      00:00:                               by mouth           [place]

[facility]



     36,000-114,      00                                 with meals           Me

dical



     000-                                         and 1 with           [facility]



     180,000                                         each           



     unit CpDR                                         snack.           

 

     lipase-prot            Yes       [id number]           Take 1           

[facility]



     ease-amylas      5-15                               capsules           [facility] 

of



     e (CREON)      00:00:                               by mouth           [place]

[facility]



     36,000-114,      00                                 with meals           Me

dical



     000-                                         and 1 with           [facility]



     180,000                                         each           



     unit CpDR                                         snack.           

 

     lipase-prot            Yes       [id number]           Take 1           

[facility]



     ease-amylas      5-15                               capsules           [facility] 

of



     e (CREON)      00:00:                               by mouth           [place]

[facility]



     36,000-114,      00                                 with meals           Me

dical



     000-                                         and 1 with           [facility]



     180,000                                         each           



     unit CpDR                                         snack.           

 

     lipase-prot            Yes       [id number]           Take 1           

[facility]



     ease-amylas      5-15                               capsules           [facility] 

of



     e (CREON)      00:00:                               by mouth           [place]

[facility]



     36,000-114,      00                                 with meals           Me

dical



     000-                                         and 1 with           [facility]



     180,000                                         each           



     unit CpDR                                         snack.           

 

     lipase-prot            Yes       [id number]           Take 1           

[facility]



     ease-amylas      5-15                               capsules           [facility] 

of



     e (CREON)      00:00:                               by mouth           [place]

[facility]



     36,000-114,      00                                 with meals           Me

dical



     000-                                         and 1 with           [facility]



     180,000                                         each           



     unit CpDR                                         snack.           

 

     lipase-prot            Yes       [id number]           Take 1           

[facility]



     ease-amylas      5-15                               capsules           [facility] 

of



     e (CREON)      00:00:                               by mouth           [place]

[facility]



     36,000-114,      00                                 with meals           Me

dical



     000-                                         and 1 with           [facility]



     180,000                                         each           



     unit CpDR                                         snack.           

 

     lipase-prot            Yes       [id number]           Take 1           

[facility]



     ease-amylas      5-15                               capsules           [facility] 

of



     e (CREON)      00:00:                               by mouth           [place]

[facility]



     36,000-114,      00                                 with meals           Me

dical



     000-                                         and 1 with           [facility]



     180,000                                         each           



     unit CpDR                                         snack.           

 

     lipase-prot            Yes       [id number]           Take 1           

[facility]



     ease-amylas      5-15                               capsules           [facility] 

of



     e (CREON)      00:00:                               by mouth           [place]

[facility]



     36,000-114,      00                                 with meals           Me

dical



     000-                                         and 1 with           [facility]



     180,000                                         each           



     unit CpDR                                         snack.           

 

     lipase-prot            Yes       [id number]           Take 1           

[facility]



     ease-amylas      5-15                               capsules           [facility] 

of



     e (CREON)      00:00:                               by mouth           [place]

[facility]



     36,000-114,      00                                 with meals           Me

dical



     000-                                         and 1 with           [facility]



     180,000                                         each           



     unit CpDR                                         snack.           

 

     lipase-prot            Yes       [id number]           Take 1           

[facility]



     ease-amylas      5-15                               capsules           [facility] 

of



     e (CREON)      00:00:                               by mouth           [place]

[facility]



     36,000-114,      00                                 with meals           Me

dical



     000-                                         and 1 with           [facility]



     180,000                                         each           



     unit CpDR                                         snack.           

 

     lipase-prot            Yes       [id number]           Take 1           

[facility]



     ease-amylas      5-15                               capsules           [facility] 

of



     e (CREON)      00:00:                               by mouth           [place]

[facility]



     36,000-114,      00                                 with meals           Me

dical



     000-                                         and 1 with           [facility]



     180,000                                         each           



     unit CpDR                                         snack.           

 

     lipase-prot            Yes       [id number]           Take 1           

[facility]



     ease-amylas      5-15                               capsules           [facility] 

of



     e (CREON)      00:00:                               by mouth           [place]

[facility]



     36,000-114,      00                                 with meals           Me

dical



     000-                                         and 1 with           [facility]



     180,000                                         each           



     unit CpDR                                         snack.           

 

     lipase-prot            Yes       [id number]           Take 1           

[facility]



     ease-amylas      5-15                               capsules           [facility] 

of



     e (CREON)      00:00:                               by mouth           [place]

[facility]



     36,000-114,      00                                 with meals           Me

dical



     000-                                         and 1 with           [facility]



     180,000                                         each           



     unit CpDR                                         snack.           

 

     lipase-prot            Yes       [id number]           Take 1           

[facility]



     ease-amylas      5-15                               capsules           [facility] 

of



     e (CREON)      00:00:                               by mouth           [place]

[facility]



     36,000-114,      00                                 with meals           Me

dical



     000-                                         and 1 with           [facility]



     180,000                                         each           



     unit CpDR                                         snack.           

 

     lipase-prot            Yes       [id number]           Take 1           

[facility]



     ease-amylas      5-15                               capsules           [facility] 

of



     e (CREON)      00:00:                               by mouth           [place]

[facility]



     36,000-114,      00                                 with meals           Me

dical



     000-                                         and 1 with           [facility]



     180,000                                         each           



     unit CpDR                                         snack.           

 

     lipase-prot            Yes       [id number]           Take 1           

[facility]



     ease-amylas      5-15                               capsules           ity 

of



     e (CREON)      00:00:                               by mouth           [place]

[facility]



     36,000-114,      00                                 with meals           Me

dical



     000-                                         and 1 with           [facility]



     180,000                                         each           



     unit CpDR                                         snack.           

 

     lipase-prot            Yes       [id number]           Take 1           

[facility]



     ease-amylas      5-15                               capsules           ity 

of



     e (CREON)      00:00:                               by mouth           [place]

[facility]



     36,000-114,      00                                 with meals           Me

dical



     000-                                         and 1 with           [facility]



     180,000                                         each           



     unit CpDR                                         snack.           

 

     lipase-prot            Yes       [id number]           Take 1           

[facility]



     ease-amylas      5-15                               capsules           [facility] 

of



     e (CREON)      00:00:                               by mouth           [place]

[facility]



     36,000-114,      00                                 with meals           Me

dical



     000-                                         and 1 with           [facility]



     180,000                                         each           



     unit CpDR                                         snack.           

 

     lipase-prot            Yes       [id number]           Take 1           

[facility]



     ease-amylas      5-15                               capsules           ity 

of



     e (CREON)      00:00:                               by mouth           [place]

[facility]



     36,000-114,      00                                 with meals           Me

dical



     000-                                         and 1 with           [facility]



     180,000                                         each           



     unit CpDR                                         snack.           

 

     lipase-prot            Yes       [id number]           Take 1           

[facility]



     ease-amylas      5-15                               capsules           ity 

of



     e (CREON)      00:00:                               by mouth           [place]

[facility]



     36,000-114,      00                                 with meals           Me

dical



     000-                                         and 1 with           [facility]



     180,000                                         each           



     unit CpDR                                         snack.           

 

     lipase-prot            Yes       [id number]           Take 1           

[facility]



     ease-amylas      5-15                               capsules           ity 

of



     e (CREON)      00:00:                               by mouth           [facility]

[facility]



     36,000-114,      00                                 with meals           Me

dical



     000-                                         and 1 with           [facility]



     180,000                                         each           



     unit CpDR                                         snack.           

 

     lipase-prot            Yes       [id number]           Take 1           

[facility]



     ease-amylas      5-15                               capsules           ity 

of



     e (CREON)      00:00:                               by mouth           [place]

[facility]



     36,000-114,      00                                 with meals           Me

dical



     000-                                         and 1 with           [facility]



     180,000                                         each           



     unit CpDR                                         snack.           

 

     lipase-prot            Yes       [id number]           Take 1           

[facility]



     ease-amylas      5-15                               capsules           ity 

of



     e (CREON)      00:00:                               by mouth           [facility]

[facility]



     36,000-114,      00                                 with meals           Me

dical



     000-                                         and 1 with           [facility]



     180,000                                         each           



     unit CpDR                                         snack.           

 

     lipase-prot            Yes       [id number]           Take 1           

[facility]



     ease-amylas      5-15                               capsules           ity 

of



     e (CREON)      00:00:                               by mouth           [facility]

[facility]



     36,000-114,      00                                 with meals           [facility]

dical



     000-                                         and 1 with           [facility]



     180,000                                         each           



     unit CpDR                                         snack.           

 

     lipase-prot            Yes       [id number]           Take 1           

[facility]



     ease-amylas      5-15                               capsules           [facility] 

of



     e (CREON)      00:00:                               by mouth           [facility]

[facility]



     36,000-114,      00                                 with meals           [facility]

d[facility]



     000-                                         and 1 with           [facility]



     180,000                                         each           



     unit CpDR                                         snack.           

 

     lipase-prot            Yes       [id number]           Take 1           

[facility]



     ease-amylas      5-15                               capsules           [facility] 

of



     e (CREON)      00:00:                               by mouth           [facility]

[facility]



     36,000-114,      00                                 with meals           [facility]

d[facility]



     000-                                         and 1 with           [facility]



     180,000                                         each           



     unit CpDR                                         snack.           

 

     lipase-prot            Yes       [id number]           Take 1           

[facility]



     ease-amylas      5-15                               capsules           [facility] 

of



     e (CREON)      00:00:                               by mouth           [facility]

[facility]



     36,000-114,      00                                 with meals           [facility]

d[facility]



     000-                                         and 1 with           [facility]



     180,000                                         each           



     unit CpDR                                         snack.           

 

     lipase-prot            Yes       [id number]           Take 1           

[facility]



     ease-amylas      5-15                               capsules           [facility] 

[facility]



     e (CREON)      00:00:                               by mouth           [facility]

[facility]



     36,000-114,      00                                 with meals           [facility]

dical



     000-                                         and 1 with           [facility]



     180,000                                         each           



     unit CpDR                                         snack.           

 

     lipase-prot            Yes       [id number]           Take 1           

[facility]



     ease-amylas      5-15                               capsules           [facility] 

of



     e (CREON)      00:00:                               by mouth           [facility]

[facility]



     36,000-114,      00                                 with meals           [facility]

dical



     000-                                         and 1 with           [facility]



     180,000                                         each           



     unit CpDR                                         snack.           

 

     lipase-prot            Yes       [id number]           Take 1           

[facility]



     ease-amylas      5-15                               capsules           [facility] 

of



     e (CREON)      00:00:                               by mouth           [facility]

[facility]



     36,000-114,      00                                 with meals           [facility]

dical



     000-                                         and 1 with           [facility]



     180,000                                         each           



     unit CpDR                                         snack.           

 

     lipase-prot            Yes       [id number]           Take 1           

[facility]



     ease-amylas      5-15                               capsules           [facility] 

of



     e (CREON)      00:00:                               by mouth           [facility]

[facility]



     36,000-114,      00                                 with meals           [facility]

dical



     000-                                         and 1 with           [facility]



     180,000                                         each           



     unit CpDR                                         snack.           

 

     lipase-prot            Yes       [id number]           Take 1           

[facility]



     ease-amylas      5-15                               capsules           [facility] 

of



     e (CREON)      00:00:                               by mouth           [facility]

[facility]



     36,000-114,      00                                 with meals           [facility]

dical



     000-                                         and 1 with           [facility]



     180,000                                         each           



     unit CpDR                                         snack.           

 

     lipase-prot            Yes       [id number]           Take 1           

[facility]



     ease-amylas      5-15                               capsules           [facility] 

of



     e (CREON)      00:00:                               by mouth           [facility]

[facility]



     36,000-114,      00                                 with meals           [facility]

dical



     000-                                         and 1 with           [facility]



     180,000                                         each           



     unit CpDR                                         snack.           

 

     lipase-prot            Yes       [id number]           Take 1           

[facility]



     ease-amylas      5-15                               capsules           [facility] 

of



     e (CREON)      00:00:                               by mouth           [facility]

[facility]



     36,000-114,      00                                 with meals           [facility]

dical



     000-                                         and 1 with           [facility]



     180,000                                         each           



     unit CpDR                                         snack.           

 

     lipase-prot            Yes       [id number]           Take 1           

[facility]



     ease-amylas      5-15                               capsules           [facility] 

of



     e (CREON)      00:00:                               by mouth           [facility]

[facility]



     36,000-114,      00                                 with meals           [facility]

dical



     000-                                         and 1 with           [facility]



     180,000                                         each           



     unit CpDR                                         snack.           

 

     lipase-prot            Yes       [id number]           Take 1           

[facility]



     ease-amylas      5-15                               capsules           [facility] 

of



     e (CREON)      00:00:                               by mouth           [facility]

[facility]



     36,000-114,      00                                 with meals           [facility]

dical



     000-                                         and 1 with           [facility]



     180,000                                         each           



     unit CpDR                                         snack.           

 

     lipase-prot            Yes       [id number]           Take 1           

[facility]



     ease-amylas      5-15                               capsules           [facility] 

of



     e (CREON)      00:00:                               by mouth           [facility]

[facility]



     36,000-114,      00                                 with meals           [facility]

dical



     000-                                         and 1 with           [facility]



     180,000                                         each           



     unit CpDR                                         snack.           

 

     lipase-prot            Yes       [id number]           Take 1           

[facility]



     ease-amylas      5-15                               capsules           [facility] 

of



     e (CREON)      00:00:                               by mouth           [facility]

[facility]



     36,000-114,      00                                 with meals           [facility]

dical



     000-                                         and 1 with           [facility]



     180,000                                         each           



     unit CpDR                                         snack.           

 

     lipase-prot            Yes       [id number]           Take 1           

[facility]



     ease-amylas      5-15                               capsules           [facility] 

of



     e (CREON)      00:00:                               by mouth           [facility]

[facility]



     36,000-114,      00                                 with meals           [facility]

dical



     000-                                         and 1 with           [facility]



     180,000                                         each           



     unit CpDR                                         snack.           

 

     lipase-prot            Yes       [id number]           Take 1           

[facility]



     ease-amylas      5-15                               capsules           [facility] 

of



     e (CREON)      00:00:                               by mouth           [facility]

[facility]



     36,000-114,      00                                 with meals           [facility]

dical



     000-                                         and 1 with           [facility]



     180,000                                         each           



     unit CpDR                                         snack.           

 

     lipase-prot            Yes       [id number]           Take 1           

[facility]



     ease-amylas      5-15                               capsules           [facility] 

of



     e (CREON)      00:00:                               by mouth           [facility]

[facility]



     36,000-114,      00                                 with meals           [facility]

dical



     000-                                         and 1 with           [facility]



     180,000                                         each           



     unit CpDR                                         snack.           

 

     lipase-prot            Yes       [id number]           Take 1           

[facility]



     ease-amylas      5-15                               capsules           [facility] 

of



     e (CREON)      00:00:                               by mouth           [facility]

[facility]



     36,000-114,      00                                 with meals           [facility]

dical



     000-                                         and 1 with           [facility]



     180,000                                         each           



     unit CpDR                                         snack.           

 

     lipase-prot            Yes       [id number]           Take 1           

[facility]



     ease-amylas      5-15                               capsules           [facility] 

of



     e (CREON)      00:00:                               by mouth           [facility]

[facility]



     36,000-114,      00                                 with meals           [facility]

dical



     000-                                         and 1 with           [facility]



     180,000                                         each           



     unit CpDR                                         snack.           

 

     lipase-prot            Yes       [id number]           Take 1           

[facility]



     ease-amylas      5-15                               capsules           [facility] 

of



     e (CREON)      00:00:                               by mouth           [facility]

[facility]



     36,000-114,      00                                 with meals           [facility]

dical



     000-                                         and 1 with           [facility]



     180,000                                         each           



     unit CpDR                                         snack.           

 

     lipase-prot            Yes       [id number]           Take 1           

[facility]



     ease-amylas      5-15                               capsules           [facility] 

of



     e (CREON)      00:00:                               by mouth           [facility]

[facility]



     36,000-114,      00                                 with meals           [facility]

dical



     000-                                         and 1 with           [facility]



     180,000                                         each           



     unit CpDR                                         snack.           

 

     lipase-prot            Yes       [id number]           Take 1           

[facility]



     ease-amylas      5-15                               capsules           [facility] 

of



     e (CREON)      00:00:                               by mouth           [facility]

[facility]



     36,000-114,      00                                 with meals           [facility]

dical



     000-                                         and 1 with           [facility]



     180,000                                         each           



     unit CpDR                                         snack.           

 

     lipase-prot            Yes       [id number]           Take 1           

[facility]



     ease-amylas      5-15                               capsules           [facility] 

of



     e (CREON)      00:00:                               by mouth           [facility]

[facility]



     36,000-114,      00                                 with meals           [facility]

dical



     000-                                         and 1 with           [facility]



     180,000                                         each           



     unit CpDR                                         snack.           

 

     lipase-prot            Yes       [id number]           Take 1           

[facility]



     ease-amylas      5-15                               capsules           [facility] 

of



     e (CREON)      00:00:                               by mouth           [facility]

[facility]



     36,000-114,      00                                 with meals           [facility]

dical



     000-                                         and 1 with           [facility]



     180,000                                         each           



     unit CpDR                                         snack.           

 

     lipase-prot            Yes       [id number]           Take 1           

[facility]



     ease-amylas      5-15                               capsules           [facility] 

of



     e (CREON)      00:00:                               by mouth           [facility]

[facility]



     36,000-114,      00                                 with meals           [facility]

dical



     000-                                         and 1 with           [facility]



     180,000                                         each           



     unit CpDR                                         snack.           

 

     lipase-prot            Yes       [id number]           Take 1           

[facility]



     ease-amylas      5-15                               capsules           [facility] 

of



     e (CREON)      00:00:                               by mouth           [facility]

[facility]



     36,000-114,      00                                 with meals           [facility]

dical



     000-                                         and 1 with           [facility]



     180,000                                         each           



     unit CpDR                                         snack.           

 

     lipase-prot            Yes       [id number]           Take 1           

[facility]



     ease-amylas      5-15                               capsules           [facility] 

of



     e (CREON)      00:00:                               by mouth           [facility]

[facility]



     36,000-114,      00                                 with meals           [facility]

dical



     000-                                         and 1 with           [facility]



     180,000                                         each           



     unit CpDR                                         snack.           

 

     omega-3            Yes       [id number] 1000mg      Take 1           [facility]

[facility]



     fatty acids      2-19                               capsule by           it

y of



     capsule      00:00:                               mouth 3           [place]



               00                                 (three)           [facility]



                                                  times           [facility]



                                                  daily with           



                                                  meals.           

 

     omega-3            Yes       [id number] 1000mg      Take 1           [facility]

[facility]



     fatty acids      2-19                               capsule by           it

y of



     capsule      00:00:                               mouth 3           [place]



               00                                 (three)           [facility]



                                                  times           [facility]



                                                  daily with           



                                                  meals.           

 

     omega-3            Yes       [id number] 1000mg      Take 1           [facility]

[facility]



     fatty acids      2-19                               capsule by           it

y of



     capsule      00:00:                               mouth 3           [place]



               00                                 (three)           [facility]



                                                  times           [facility]



                                                  daily with           



                                                  meals.           

 

     omega-3            Yes       [id number] 1000mg      Take 1           [facility]

[facility]



     fatty acids      2-19                               capsule by           it

y of



     capsule      00:00:                               mouth 3           [place]



               00                                 (three)           [facility]



                                                  times           [facility]



                                                  daily with           



                                                  meals.           

 

     omega-3            Yes       [id number] 1000mg      Take 1           [facility]

[facility]



     fatty acids      2-19                               capsule by           it

y of



     capsule      00:00:                               mouth 3           [place]



               00                                 (three)           [facility]



                                                  times           [facility]



                                                  daily with           



                                                  meals.           

 

     omega-3            Yes       [id number] 1000mg      Take 1           [facility]

[facility]



     fatty acids      2-19                               capsule by           it

y of



     capsule      00:00:                               mouth 3           [place]



               00                                 (three)           [facility]



                                                  times           [facility]



                                                  daily with           



                                                  meals.           

 

     omega-3            Yes       [id number] 1000mg      Take 1           [facility]

[facility]



     fatty acids      2-19                               capsule by           it

y of



     capsule      00:00:                               mouth 3           [place]



               00                                 (three)           [facility]



                                                  times           [facility]



                                                  daily with           



                                                  meals.           

 

     omega-3      0      Yes       [id number] 1000mg      Take 1           [facility]

[facility]



     fatty acids      2-19                               capsule by           it

y of



     capsule      00:00:                               mouth 3           [place]



               00                                 (three)           [facility]



                                                  times           [facility]



                                                  daily with           



                                                  meals.           

 

     omega-3            Yes       [id number] 1000mg      Take 1           [facility]

[facility]



     fatty acids      2-19                               capsule by           it

y of



     capsule      00:00:                               mouth 3           [place]



               00                                 (three)           [facility]



                                                  times           [facility]



                                                  daily with           



                                                  meals.           

 

     omega-3      0      Yes       [id number] 1000mg      Take 1           [facility]

[facility]



     fatty acids      2-19                               capsule by           it

y of



     capsule      00:00:                               mouth 3           [place]



               00                                 (three)           [facility]



                                                  times           [facility]



                                                  daily with           



                                                  meals.           

 

     omega-3      -0      Yes       [id number] 1000mg      Take 1           [facility]

[facility]



     fatty acids      2-19                               capsule by           it

y of



     capsule      00:00:                               mouth 3           [place]



               00                                 (three)           [facility]



                                                  times           [facility]



                                                  daily with           



                                                  meals.           

 

     omega-3      0      Yes       [id number] 1000mg      Take 1           [facility]

[facility]



     fatty acids      2-19                               capsule by           it

y of



     capsule      00:00:                               mouth 3           [place]



               00                                 (three)           [facility]



                                                  times           [facility]



                                                  daily with           



                                                  meals.           

 

     omega-3      0      Yes       [id number] 1000mg      Take 1           [facility]

[facility]



     fatty acids      2-19                               capsule by           it

y of



     capsule      00:00:                               mouth 3           [place]



               00                                 (three)           [facility]



                                                  times           [facility]



                                                  daily with           



                                                  meals.           

 

     omega-3      0      Yes       [id number] 1000mg      Take 1           [facility]

[facility]



     fatty acids      2-19                               capsule by           it

y of



     capsule      00:00:                               mouth 3           [place]



               00                                 (three)           [facility]



                                                  times           [facility]



                                                  daily with           



                                                  meals.           

 

     omega-3            Yes       [id number] 1000mg      Take 1           [facility]

[facility]



     fatty acids      2-19                               capsule by           it

y of



     capsule      00:00:                               mouth 3           [place]



               00                                 (three)           [facility]



                                                  times           [facility]



                                                  daily with           



                                                  meals.           

 

     omega-3      0      Yes       [id number] 1000mg      Take 1           [facility]

n[facility]



     fatty acids      2-19                               capsule by           it

y of



     capsule      00:00:                               mouth 3           [place]



               00                                 (three)           [facility]



                                                  times           [facility]



                                                  daily with           



                                                  meals.           

 

     omega-3      0      Yes       [id number] 1000mg      Take 1           [facility]

[facility]



     fatty acids      2-19                               capsule by           it

y of



     capsule      00:00:                               mouth 3           [place]



               00                                 (three)           [facility]



                                                  times           [facility]



                                                  daily with           



                                                  meals.           

 

     omega-3      -0      Yes       [id number] 1000mg      Take 1           [facility]

[facility]



     fatty acids      2-19                               capsule by           it

y of



     capsule      00:00:                               mouth 3           [place]



               00                                 (three)           Medical



                                                  times           [facility]



                                                  daily with           



                                                  meals.           

 

     omega-3      -0      Yes       [id number] 1000mg      Take 1           [facility]

[facility]



     fatty acids      2-19                               capsule by           it

y of



     capsule      00:00:                               mouth 3           [place]



               00                                 (three)           Medical



                                                  times           [facility]



                                                  daily with           



                                                  meals.           

 

     omega-3      -0      Yes       [id number] 1000mg      Take 1           [facility]

[facility]



     fatty acids      2-19                               capsule by           it

y of



     capsule      00:00:                               mouth 3           [place]



               00                                 (three)           Medical



                                                  times           [facility]



                                                  daily with           



                                                  meals.           

 

     omega-3      -0      Yes       [id number] 1000mg      Take 1           [facility]

[facility]



     fatty acids      2-19                               capsule by           it

y of



     capsule      00:00:                               mouth 3           [place]



               00                                 (three)           Medical



                                                  times           [facility]



                                                  daily with           



                                                  meals.           

 

     omega-3      -0      Yes       [id number] 1000mg      Take 1           [facility]

[facility]



     fatty acids      2-19                               capsule by           it

y of



     capsule      00:00:                               mouth 3           [place]



               00                                 (three)           Medical



                                                  times           [facility]



                                                  daily with           



                                                  meals.           

 

     omega-3      -0      Yes       [id number] 1000mg      Take 1           [facility]

[facility]



     fatty acids      2-19                               capsule by           it

y of



     capsule      00:00:                               mouth 3           [place]



               00                                 (three)           Medical



                                                  times           [facility]



                                                  daily with           



                                                  meals.           

 

     omega-3      -0      Yes       [id number] 1000mg      Take 1           [facility]

[facility]



     fatty acids      2-19                               capsule by           it

y of



     capsule      00:00:                               mouth 3           [place]



               00                                 (three)           Medical



                                                  times           [facility]



                                                  daily with           



                                                  meals.           

 

     omega-3      -0      Yes       [id number] 1000mg      Take 1           [facility]

[facility]



     fatty acids      2-19                               capsule by           it

y of



     capsule      00:00:                               mouth 3           [place]



               00                                 (three)           Medical



                                                  times           [facility]



                                                  daily with           



                                                  meals.           

 

     omega-3      -0      Yes       [id number] 1000mg      Take 1           [facility]

[facility]



     fatty acids      2-19                               capsule by           it

y of



     capsule      00:00:                               mouth 3           [place]



               00                                 (three)           Medical



                                                  times           [facility]



                                                  daily with           



                                                  meals.           

 

     omega-3      -0      Yes       [id number] 1000mg      Take 1           [facility]

[facility]



     fatty acids      2-19                               capsule by           it

y of



     capsule      00:00:                               mouth 3           [place]



               00                                 (three)           Medical



                                                  times           [facility]



                                                  daily with           



                                                  meals.           

 

     omega-3      -0      Yes       [id number] 1000mg      Take 1           [facility]

[facility]



     fatty acids      2-19                               capsule by           it

y of



     capsule      00:00:                               mouth 3           [place]



               00                                 (three)           Medical



                                                  times           [facility]



                                                  daily with           



                                                  meals.           

 

     omega-3      -0      Yes       [id number] 1000mg      Take 1           [facility]

[facility]



     fatty acids      2-19                               capsule by           it

y of



     capsule      00:00:                               mouth 3           [place]



               00                                 (three)           Medical



                                                  times           [facility]



                                                  daily with           



                                                  meals.           

 

     omega-3      -0      Yes       [id number] 1000mg      Take 1           [facility]

[facility]



     fatty acids      2-19                               capsule by           it

y of



     capsule      00:00:                               mouth 3           [place]



               00                                 (three)           Medical



                                                  times           [facility]



                                                  daily with           



                                                  meals.           

 

     omega-3      -0      Yes       [id number] 1000mg      Take 1           [facility]

[facility]



     fatty acids      2-19                               capsule by           it

y of



     capsule      00:00:                               mouth 3           [place]



               00                                 (three)           Medical



                                                  times           [facility]



                                                  daily with           



                                                  meals.           

 

     omega-3      -0      Yes       [id number] 1000mg      Take 1           [facility]

[facility]



     fatty acids      2-19                               capsule by           it

y of



     capsule      00:00:                               mouth 3           [place]



               00                                 (three)           Medical



                                                  times           [facility]



                                                  daily with           



                                                  meals.           

 

     omega-3      -0      Yes       [id number] 1000mg      Take 1           [facility]

[facility]



     fatty acids      2-19                               capsule by           it

y of



     capsule      00:00:                               mouth 3           [place]



               00                                 (three)           Medical



                                                  times           [facility]



                                                  daily with           



                                                  meals.           

 

     omega-3      -0      Yes       [id number] 1000mg      Take 1           [facility]

[facility]



     fatty acids      2-19                               capsule by           it

y of



     capsule      00:00:                               mouth 3           [place]



               00                                 (three)           Medical



                                                  times           [facility]



                                                  daily with           



                                                  meals.           

 

     omega-3      0      Yes       [id number] 1000mg      Take 1           [facility]

[facility]



     fatty acids      2-19                               capsule by           it

y of



     capsule      00:00:                               mouth 3           [place]



               00                                 (three)           Medical



                                                  times           [facility]



                                                  daily with           



                                                  meals.           

 

     omega-3      0      Yes       [id number] 1000mg      Take 1           [facility]

[facility]



     fatty acids      2-19                               capsule by           it

y of



     capsule      00:00:                               mouth 3           [place]



               00                                 (three)           Medical



                                                  times           [facility]



                                                  daily with           



                                                  meals.           

 

     omega-3      -0      Yes       [id number] 1000mg      Take 1           [facility]

[facility]



     fatty acids      2-19                               capsule by           it

y of



     capsule      00:00:                               mouth 3           [place]



               00                                 (three)           Medical



                                                  times           [facility]



                                                  daily with           



                                                  meals.           

 

     omega-3      -0      Yes       [id number] 1000mg      Take 1           [facility]

[facility]



     fatty acids      2-19                               capsule by           it

y of



     capsule      00:00:                               mouth 3           [place]



               00                                 (three)           Medical



                                                  times           [facility]



                                                  daily with           



                                                  meals.           

 

     omega-3      -0      Yes       [id number] 1000mg      Take 1           [facility]

[facility]



     fatty acids      2-19                               capsule by           it

y of



     capsule      00:00:                               mouth 3           [place]



               00                                 (three)           Medical



                                                  times           [facility]



                                                  daily with           



                                                  meals.           

 

     omega-3      -0      Yes       [id number] 1000mg      Take 1           [facility]

[facility]



     fatty acids      2-19                               capsule by           it

y of



     capsule      00:00:                               mouth 3           [place]



               00                                 (three)           Medical



                                                  times           [facility]



                                                  daily with           



                                                  meals.           

 

     omega-3      -0      Yes       [id number] 1000mg      Take 1           [facility]

[facility]



     fatty acids      2-19                               capsule by           it

y of



     capsule      00:00:                               mouth 3           [place]



               00                                 (three)           Medical



                                                  times           [facility]



                                                  daily with           



                                                  meals.           

 

     omega-3      -0      Yes       [id number] 1000mg      Take 1           [facility]

[facility]



     fatty acids      2-19                               capsule by           it

y of



     capsule      00:00:                               mouth 3           [place]



               00                                 (three)           Medical



                                                  times           [facility]



                                                  daily with           



                                                  meals.           

 

     omega-3      -0      Yes       [id number] 1000mg      Take 1           [facility]

[facility]



     fatty acids      2-19                               capsule by           it

y of



     capsule      00:00:                               mouth 3           [place]



               00                                 (three)           Medical



                                                  times           [facility]



                                                  daily with           



                                                  meals.           

 

     omega-3      -0      Yes       [id number] 1000mg      Take 1           [facility]

n[facility]



     fatty acids      2-19                               capsule by           it

y of



     capsule      00:00:                               mouth 3           [place]



               00                                 (three)           Medical



                                                  times           [facility]



                                                  daily with           



                                                  meals.           

 

     omega-3      -0      Yes       [id number] 1000mg      Take 1           [facility]

n[facility]



     fatty acids      2-19                               capsule by           it

y of



     capsule      00:00:                               mouth 3           [place]



               00                                 (three)           Medical



                                                  times           [facility]



                                                  daily with           



                                                  meals.           

 

     omega-3      -0      Yes       [id number] 1000mg      Take 1           [facility]

[facility]



     fatty acids      2-19                               capsule by           it

y of



     capsule      00:00:                               mouth 3           [place]



               00                                 (three)           Medical



                                                  times           [facility]



                                                  daily with           



                                                  meals.           

 

     omega-3      -0      Yes       [id number] 1000mg      Take 1           [facility]

n[facility]



     fatty acids      2-19                               capsule by           it

y of



     capsule      00:00:                               mouth 3           [place]



               00                                 (three)           Medical



                                                  times           [facility]



                                                  daily with           



                                                  meals.           

 

     omega-3      -0      Yes       [id number] 1000mg      Take 1           [facility]

nivers



     fatty acids      2-19                               capsule by           it

y of



     capsule      00:00:                               mouth 3           [place]



               00                                 (three)           Medical



                                                  times           [facility]



                                                  daily with           



                                                  meals.           

 

     omega-3      -0      Yes       [id number] 1000mg      Take 1           [facility]

nivers



     fatty acids      2-19                               capsule by           it

y of



     capsule      00:00:                               mouth 3           [place]



               00                                 (three)           Medical



                                                  times           [facility]



                                                  daily with           



                                                  meals.           

 

     omega-3      -0      Yes       [id number] 1000mg      Take 1           [facility]

nivers



     fatty acids      2-19                               capsule by           it

y of



     capsule      00:00:                               mouth 3           [place]



               00                                 (three)           Medical



                                                  times           [facility]



                                                  daily with           



                                                  meals.           

 

     omega-3      -0      Yes       [id number] 1000mg      Take 1           [facility]

nivers



     fatty acids      2-19                               capsule by           it

y of



     capsule      00:00:                               mouth 3           [place]



               00                                 (three)           Medical



                                                  times           [facility]



                                                  daily with           



                                                  meals.           

 

     omega-3      -0      Yes       [id number] 1000mg      Take 1           [facility]

nivers



     fatty acids      2-19                               capsule by           it

y of



     capsule      00:00:                               mouth 3           [place]



               00                                 (three)           Medical



                                                  times           [facility]



                                                  daily with           



                                                  meals.           

 

     omega-3      -0      Yes       [id number] 1000mg      Take 1           [facility]

nivers



     fatty acids      2-19                               capsule by           it

y of



     capsule      00:00:                               mouth 3           [place]



               00                                 (three)           Medical



                                                  times           [facility]



                                                  daily with           



                                                  meals.           

 

     omega-3      -0      Yes       [id number] 1000mg      Take 1           [facility]

nivers



     fatty acids      2-19                               capsule by           it

y of



     capsule      00:00:                               mouth 3           [place]



               00                                 (three)           Medical



                                                  times           [facility]



                                                  daily with           



                                                  meals.           

 

     omega-3      -0      Yes       [id number] 1000mg      Take 1           [facility]

nivers



     fatty acids      2-19                               capsule by           it

y of



     capsule      00:00:                               mouth 3           [place]



               00                                 (three)           Medical



                                                  times           [facility]



                                                  daily with           



                                                  meals.           

 

     omega-3      0      Yes       [id number] 1000mg      Take 1           [facility]

nivers



     fatty acids      2-19                               capsule by           it

y of



     capsule      00:00:                               mouth 3           [place]



               00                                 (three)           Medical



                                                  times           [facility]



                                                  daily with           



                                                  meals.           

 

     omega-3      0      Yes       [id number] 1000mg      Take 1           [facility]

nivers



     fatty acids      2-19                               capsule by           it

y of



     capsule      00:00:                               mouth 3           [place]



               00                                 (three)           Medical



                                                  times           [facility]



                                                  daily with           



                                                  meals.           

 

     omega-3      0      Yes       [id number] 1000mg      Take 1           [facility]

nivers



     fatty acids      2-19                               capsule by           it

y of



     capsule      00:00:                               mouth 3           [place]



               00                                 (three)           Medical



                                                  times           [facility]



                                                  daily with           



                                                  meals.           

 

     omega-3      -0      Yes       [id number] 1000mg      Take 1           [facility]

nivers



     fatty acids      2-19                               capsule by           it

y of



     capsule      00:00:                               mouth 3           [place]



               00                                 (three)           Medical



                                                  times           [facility]



                                                  daily with           



                                                  meals.           

 

     omega-3      -0      Yes       [id number] 1000mg      Take 1           [facility]

nivers



     fatty acids      2-19                               capsule by           it

y of



     capsule      00:00:                               mouth 3           [place]



               00                                 (three)           Medical



                                                  times           [facility]



                                                  daily with           



                                                  meals.           

 

     omega-3      -0      Yes       [id number] 1000mg      Take 1           [facility]

nivers



     fatty acids      2-19                               capsule by           it

y of



     capsule      00:00:                               mouth 3           [place]



               00                                 (three)           Medical



                                                  times           [facility]



                                                  daily with           



                                                  meals.           

 

     omega-3      -0      Yes       [id number] 1000mg      Take 1           U

nivers



     fatty acids      2-19                               capsule by           it

y of



     capsule      00:00:                               mouth 3           [place]



               00                                 (three)           Medical



                                                  times           [place]



                                                  daily with           



                                                  meals.           

 

     omega-3      -0      Yes       [id number] 1000mg      Take 1           U

nivers



     fatty acids      2-19                               capsule by           it

y of



     capsule      00:00:                               mouth 3           [place]



               00                                 (three)           Medical



                                                  times           [place]



                                                  daily with           



                                                  meals.           

 

     lancets 0      Yes       [id number]           Use as           U

nivers



     gauge Misc      1-12                               directed           ity o

f



               00:00:                                              [place]



                                                               [facility]



                                                                 [facility]anch

 

     Blood      -0      Yes       [id number]           Use as           Univer

s



     Glucose      1-12                               directed           ity of



     Strip-Disp      00:00:                                              [place]



     Meter Kit                                                      [facility]

 

     blood sugar      -0      Yes       [id number]           Use as           

[facility]



     diagnostic      1-12                               directed           ity o

f



     strip      00:00:                                              [place]



                                                               [facility]



                                                                 Branch

 

     Syringe,      -0      Yes       [id number]           Use as           [facility]

vers



     Disposable,      1-12                               directed           ity 

of



     3 mL Syrg      00:00:                                              [place]



                                                               [facility]



                                                                 [facility]anch

 

     lancets 2021-0      Yes       [id number]           Use as           U

nivers



     gauge Misc      1-12                               directed           ity o

f



               00:00:                                              [place]



                                                               [facility]



                                                                 [facility]anch

 

     Blood      -0      Yes       [id number]           Use as           Univer

s



     Glucose      1-12                               directed           ity of



     Strip-Disp      00:00:                                              [place]



     Meter Kit                                                      Medical



                                                                 [place]

 

     blood sugar      -0      Yes       [id number]           Use as           

[facility]



     diagnostic      1-12                               directed           ity o

f



     strip      00:00:                                              [place]



                                                               Medical



                                                                 Branch

 

     Syringe,      -0      Yes       [id number]           Use as           Uni

vers



     Disposable,      1-12                               directed           ity 

of



     3 mL Syrg      00:00:                                              [place]



                                                               [facility]



                                                                 [facility]anch

 

     lancets 23      -0      Yes       [id number]           Use as           U

nivers



     gauge Misc      1-12                               directed           ity o

f



               00:00:                                              [place]



                                                               [facility]



                                                                 [facility]anch

 

     Blood      -0      Yes       [id number]           Use as           Univer

s



     Glucose      1-12                               directed           ity of



     Strip-Disp      00:00:                                              [place]



     Meter Kit      00                                                Medical



                                                                 Branch

 

     blood sugar      -0      Yes       [id number]           Use as           

[facility]



     diagnostic      1-12                               directed           ity o

f



     strip      00:00:                                              [place]



               00                                                Medical



                                                                 Branch

 

     Syringe,      -0      Yes       [id number]           Use as           Uni

vers



     Disposable,      1-12                               directed           ity 

of



     3 mL Syrg      00:00:                                              [place]



               00                                                Medical



                                                                 Branch

 

     lancets 23      -0      Yes       [id number]           Use as           U

nivers



     gauge Misc      1-12                               directed           ity o

f



               00:00:                                              [place]



                                                               Medical



                                                                 [facility]anch

 

     Blood      -0      Yes       [id number]           Use as           Univer

s



     Glucose      1-12                               directed           ity of



     Strip-Disp      00:00:                                              [place]



     Meter Kit                                                      Medical



                                                                 Branch

 

     blood sugar      -0      Yes       [id number]           Use as           

[facility]



     diagnostic      1-12                               directed           ity o

f



     strip      00:00:                                              [place]



                                                               Medical



                                                                 [facility]anch

 

     Syringe,      -0      Yes       [id number]           Use as           Uni

vers



     Disposable,      1-12                               directed           ity 

of



     3 mL Syrg      00:00:                                              [place]



                                                               [facility]



                                                                 [facility]anch

 

     lancets 23      -0      Yes       [id number]           Use as           U

nivers



     gauge Misc      1-12                               directed           ity o

f



               00:00:                                              [place]



                                                               [facility]



                                                                 [facility]anch

 

     Blood      -0      Yes       [id number]           Use as           Univer

s



     Glucose      1-12                               directed           ity of



     Strip-Disp      00:00:                                              [place]



     Meter Kit                                                      Medical



                                                                 Branch

 

     blood sugar      -0      Yes       [id number]           Use as           

[facility]



     diagnostic      1-12                               directed           ity o

f



     strip      00:00:                                              [place]



                                                               Medical



                                                                 Branch

 

     Syringe,      -0      Yes       [id number]           Use as           Uni

vers



     Disposable,      1-12                               directed           ity 

of



     3 mL Syrg      00:00:                                              [place]



                                                               Medical



                                                                 Branch

 

     lancets 23      -0      Yes       [id number]           Use as           U

nivers



     gauge Misc      1-12                               directed           ity o

f



               00:00:                                              [place]



                                                               Medical



                                                                 Branch

 

     Blood      -0      Yes       [id number]           Use as           Univer

s



     Glucose      1-12                               directed           ity of



     Strip-Disp      00:00:                                              [place]



     Meter Kit                                                      Medical



                                                                 Branch

 

     blood sugar      -0      Yes       [id number]           Use as           

[facility]



     diagnostic      1-12                               directed           ity o

f



     strip      00:00:                                              [place]



                                                               Medical



                                                                 Branch

 

     Syringe,      -0      Yes       [id number]           Use as           Uni

vers



     Disposable,      1-12                               directed           ity 

of



     3 mL Syrg      00:00:                                              [place]



                                                               Medical



                                                                 Branch

 

     lancets 23      -0      Yes       [id number]           Use as           U

nivers



     gauge Misc      1-12                               directed           ity o

f



               00:00:                                              [place]



                                                               Medical



                                                                 Branch

 

     Blood      -0      Yes       [id number]           Use as           Univer

s



     Glucose      1-12                               directed           ity of



     Strip-Disp      00:00:                                              [place]



     Meter Kit                                                      Medical



                                                                 Branch

 

     blood sugar      -0      Yes       [id number]           Use as           

[facility]



     diagnostic      1-12                               directed           ity o

f



     strip      00:00:                                              [place]



               00                                                Medical



                                                                 Branch

 

     Syringe,      -0      Yes       [id number]           Use as           Uni

vers



     Disposable,      1-12                               directed           ity 

of



     3 mL Syrg      00:00:                                              [place]



                                                               Medical



                                                                 Branch

 

     lancets 23      -0      Yes       [id number]           Use as           U

nivers



     gauge Misc      1-12                               directed           ity o

f



               00:00:                                              [place]



                                                               Medical



                                                                 Branch

 

     Blood      -0      Yes       [id number]           Use as           Univer

s



     Glucose      1-12                               directed           ity of



     Strip-Disp      00:00:                                              [place]



     Meter Kit      00                                                Medical



                                                                 Branch

 

     blood sugar      -0      Yes       [id number]           Use as           

[facility]



     diagnostic      1-12                               directed           ity o

f



     strip      00:00:                                              [place]



                                                               Medical



                                                                 Branch

 

     Syringe,      -0      Yes       [id number]           Use as           Uni

vers



     Disposable,      1-12                               directed           ity 

of



     3 mL Syrg      00:00:                                              [place]



                                                               Medical



                                                                 Branch

 

     lancets 23      -0      Yes       [id number]           Use as           U

nivers



     gauge Misc      1-12                               directed           ity o

f



               00:00:                                              [place]



                                                               Medical



                                                                 Branch

 

     Blood      -0      Yes       [id number]           Use as           Univer

s



     Glucose      1-12                               directed           ity of



     Strip-Disp      00:00:                                              [place]



     Meter Kit                                                      Medical



                                                                 Branch

 

     blood sugar      -0      Yes       [id number]           Use as           

[facility]



     diagnostic      1-12                               directed           ity o

f



     strip      00:00:                                              [place]



                                                               Medical



                                                                 Branch

 

     Syringe,      -0      Yes       [id number]           Use as           Uni

vers



     Disposable,      1-12                               directed           ity 

of



     3 mL Syrg      00:00:                                              [place]



                                                               Medical



                                                                 Branch

 

     lancets 23      -0      Yes       [id number]           Use as           U

nivers



     gauge Misc      1-12                               directed           ity o

f



               00:00:                                              [place]



                                                               Medical



                                                                 Branch

 

     Blood      -0      Yes       [id number]           Use as           Univer

s



     Glucose      1-12                               directed           ity of



     Strip-Disp      00:00:                                              [place]



     Meter Kit      00                                                Medical



                                                                 Branch

 

     blood sugar      -0      Yes       [id number]           Use as           

[facility]



     diagnostic      1-12                               directed           ity o

f



     strip      00:00:                                              [place]



               00                                                Medical



                                                                 Branch

 

     Syringe,      -0      Yes       [id number]           Use as           Uni

vers



     Disposable,      1-12                               directed           ity 

of



     3 mL Syrg      00:00:                                              [place]



                                                               Medical



                                                                 Branch

 

     lancets 23      -0      Yes       [id number]           Use as           U

nivers



     gauge Misc      1-12                               directed           ity o

f



               00:00:                                              [place]



                                                               Medical



                                                                 Branch

 

     Blood      -0      Yes       [id number]           Use as           Univer

s



     Glucose      1-12                               directed           ity of



     Strip-Disp      00:00:                                              [place]



     Meter Kit                                                      Medical



                                                                 Branch

 

     blood sugar      0      Yes       [id number]           Use as           

[facility]



     diagnostic      1-12                               directed           ity o

f



     strip      00:00:                                              [place]



                                                               Medical



                                                                 Branch

 

     Syringe,      -0      Yes       [id number]           Use as           Uni

vers



     Disposable,      1-12                               directed           ity 

of



     3 mL Syrg      00:00:                                              [place]



                                                               Medical



                                                                 Branch

 

     lancets 0      Yes       [id number]           Use as           U

nivers



     gauge Misc      1-12                               directed           ity o

f



               00:00:                                              [place]



                                                               Medical



                                                                 Branch

 

     Blood      -0      Yes       [id number]           Use as           Univer

s



     Glucose      1-12                               directed           ity of



     Strip-Disp      00:00:                                              [place]



     Meter Kit                                                      Medical



                                                                 Branch

 

     blood sugar      0      Yes       [id number]           Use as           

[facility]



     diagnostic      1-12                               directed           ity o

f



     strip      00:00:                                              [place]



                                                               Medical



                                                                 Branch

 

     Syringe,      0      Yes       [id number]           Use as           Uni

vers



     Disposable,      1-12                               directed           ity 

of



     3 mL Syrg      00:00:                                              [place]



                                                               Medical



                                                                 Branch

 

     lancets 2021-0      Yes       [id number]           Use as           U

nivers



     gauge Misc      1-12                               directed           ity o

f



               00:00:                                              [place]



                                                               Medical



                                                                 Branch

 

     Blood      -0      Yes       [id number]           Use as           Univer

s



     Glucose      1-12                               directed           ity of



     Strip-Disp      00:00:                                              [place]



     Meter Kit                                                      Medical



                                                                 Branch

 

     blood sugar      -0      Yes       [id number]           Use as           

[facility]



     diagnostic      1-12                               directed           ity o

f



     strip      00:00:                                              [place]



                                                               Medical



                                                                 Branch

 

     Syringe,      -0      Yes       [id number]           Use as           Uni

vers



     Disposable,      1-12                               directed           ity 

of



     3 mL Syrg      00:00:                                              [place]



                                                               Medical



                                                                 Branch

 

     lancets 23      -0      Yes       [id number]           Use as           U

nivers



     gauge Misc      1-12                               directed           ity o

f



               00:00:                                              [place]



                                                               Medical



                                                                 Branch

 

     Blood      -0      Yes       [id number]           Use as           Univer

s



     Glucose      1-12                               directed           ity of



     Strip-Disp      00:00:                                              [place]



     Meter Kit                                                      Medical



                                                                 Branch

 

     blood sugar      -0      Yes       [id number]           Use as           

[facility]



     diagnostic      1-12                               directed           ity o

f



     strip      00:00:                                              [place]



                                                               Medical



                                                                 Branch

 

     Syringe,      -0      Yes       [id number]           Use as           [facility]

vers



     Disposable,      1-12                               directed           ity 

of



     3 mL Syrg      00:00:                                              [place]



                                                               [facility]



                                                                 [facility]anch

 

     lancets 23      -0      Yes       [id number]           Use as           U

nivers



     gauge Misc      1-12                               directed           ity o

f



               00:00:                                              [place]



                                                               [facility]



                                                                 Branch

 

     Blood      -0      Yes       [id number]           Use as           [facility]

s



     Glucose      1-12                               directed           ity of



     Strip-Disp      00:00:                                              [place]



     Meter Kit                                                      Medical



                                                                 Branch

 

     blood sugar      -0      Yes       [id number]           Use as           

[facility]



     diagnostic      1-12                               directed           ity o

f



     strip      00:00:                                              [place]



                                                               [facility]



                                                                 Branch

 

     Syringe,      -0      Yes       [id number]           Use as           [facility]

vers



     Disposable,      1-12                               directed           ity 

of



     3 mL Syrg      00:00:                                              [place]



                                                               [facility]



                                                                 Branch

 

     lancets 23      -0      Yes       [id number]           Use as           U

nivers



     gauge Misc      1-12                               directed           ity o

f



               00:00:                                              [facility]



                                                               [facility]



                                                                 [facility]anch

 

     Blood      -0      Yes       [id number]           Use as           Un[facility]

s



     Glucose      1-12                               directed           ity of



     Strip-Disp      00:00:                                              [place]



     Meter Kit                                                      Medical



                                                                 Branch

 

     blood sugar      -0      Yes       [id number]           Use as           

[facility]



     diagnostic      1-12                               directed           ity o

f



     strip      00:00:                                              [place]



                                                               [facility]



                                                                 Branch

 

     Syringe,      -0      Yes       [id number]           Use as           [facility]

vers



     Disposable,      1-12                               directed           ity 

of



     3 mL Syrg      00:00:                                              [place]



                                                               [facility]



                                                                 Branch

 

     lancets 23      -0      Yes       [id number]           Use as           U

nivers



     gauge Misc      1-12                               directed           ity o

f



               00:00:                                              [place]



                                                               [facility]



                                                                 [facility]anch

 

     Blood      -0      Yes       [id number]           Use as           Un[facility]

s



     Glucose      1-12                               directed           ity of



     Strip-Disp      00:00:                                              [place]



     Meter Kit                                                      Medical



                                                                 Branch

 

     blood sugar      -0      Yes       [id number]           Use as           

[facility]



     diagnostic      1-12                               directed           ity o

f



     strip      00:00:                                              [place]



                                                               [facility]



                                                                 Branch

 

     Syringe,      -0      Yes       [id number]           Use as           [facility]

vers



     Disposable,      1-12                               directed           ity 

of



     3 mL Syrg      00:00:                                              [place]



                                                               [facility]



                                                                 [facility]anch

 

     lancets 23      -0      Yes       [id number]           Use as           U

nivers



     gauge Misc      1-12                               directed           ity o

f



               00:00:                                              [place]



                                                               [facility]



                                                                 [facility]anch

 

     Blood      -0      Yes       [id number]           Use as           Un[facility]

s



     Glucose      1-12                               directed           ity of



     Strip-Disp      00:00:                                              [place]



     Meter Kit                                                      Medical



                                                                 Branch

 

     blood sugar      -0      Yes       [id number]           Use as           

[facility]



     diagnostic      1-12                               directed           ity o

f



     strip      00:00:                                              [place]



               00                                                Medical



                                                                 Branch

 

     Syringe,      -0      Yes       [id number]           Use as           [facility]

vers



     Disposable,      1-12                               directed           ity 

of



     3 mL Syrg      00:00:                                              [place]



               00                                                [facility]



                                                                 Branch

 

     lancets 2021-0      Yes       [id number]           Use as           U

nivers



     gauge Misc      1-12                               directed           ity o

f



               00:00:                                              [place]



                                                               [facility]



                                                                 [facility]anch

 

     Blood      -0      Yes       [id number]           Use as           [facility]

s



     Glucose      1-12                               directed           ity of



     Strip-Disp      00:00:                                              [place]



     Meter Kit                                                      Medical



                                                                 Branch

 

     blood sugar      -0      Yes       [id number]           Use as           

[facility]



     diagnostic      1-12                               directed           ity o

f



     strip      00:00:                                              [place]



                                                               [facility]



                                                                 Branch

 

     Syringe,      -0      Yes       [id number]           Use as           [facility]

vers



     Disposable,      1-12                               directed           ity 

of



     3 mL Syrg      00:00:                                              [place]



               00                                                [facility]



                                                                 Branch

 

     lancets 0      Yes       [id number]           Use as           U

nivers



     gauge Misc      1-12                               directed           ity o

f



               00:00:                                              [place]



               00                                                [facility]



                                                                 Branch

 

     Blood      -0      Yes       [id number]           Use as           Un[facility]

s



     Glucose      1-12                               directed           ity of



     Strip-Disp      00:00:                                              [place]



     Meter Kit                                                      Medical



                                                                 Branch

 

     blood sugar      -0      Yes       [id number]           Use as           

[facility]



     diagnostic      1-12                               directed           ity o

f



     strip      00:00:                                              [place]



                                                               [facility]



                                                                 Branch

 

     Syringe,      -0      Yes       [id number]           Use as           [facility]

vers



     Disposable,      1-12                               directed           ity 

of



     3 mL Syrg      00:00:                                              [place]



                                                               [facility]



                                                                 [facility]anch

 

     lancets 23      -0      Yes       [id number]           Use as           U

nivers



     gauge Misc      1-12                               directed           ity o

f



               00:00:                                              [place]



                                                               [facility]



                                                                 [facility]anch

 

     Blood      -0      Yes       [id number]           Use as           [facility]

s



     Glucose      1-12                               directed           ity of



     Strip-Disp      00:00:                                              [place]



     Meter Kit      00                                                Medical



                                                                 Branch

 

     blood sugar      -0      Yes       [id number]           Use as           

[facility]



     diagnostic      1-12                               directed           ity o

f



     strip      00:00:                                              [place]



                                                               [facility]



                                                                 Branch

 

     Syringe,      -0      Yes       [id number]           Use as           [facility]

vers



     Disposable,      1-12                               directed           ity 

of



     3 mL Syrg      00:00:                                              [place]



                                                               [facility]



                                                                 [facility]anch

 

     lancets 23      -0      Yes       [id number]           Use as           U

nivers



     gauge Misc      1-12                               directed           ity o

f



               00:00:                                              [place]



                                                               [facility]



                                                                 [facility]anch

 

     Blood      -0      Yes       [id number]           Use as           Un[facility]

s



     Glucose      1-12                               directed           ity of



     Strip-Disp      00:00:                                              [place]



     Meter Kit                                                      Medical



                                                                 Branch

 

     blood sugar      -0      Yes       [id number]           Use as           

[facility]



     diagnostic      1-12                               directed           ity o

f



     strip      00:00:                                              [place]



               00                                                [facility]



                                                                 Branch

 

     Syringe,      -0      Yes       [id number]           Use as           [facility]

vers



     Disposable,      1-12                               directed           ity 

of



     3 mL Syrg      00:00:                                              [place]



                                                               [facility]



                                                                 [facility]anch

 

     lancets 23      -0      Yes       [id number]           Use as           U

nivers



     gauge Misc      1-12                               directed           ity o

f



               00:00:                                              [place]



                                                               [facility]



                                                                 [facility]anch

 

     Blood      -0      Yes       [id number]           Use as           Un[facility]

s



     Glucose      1-12                               directed           ity of



     Strip-Disp      00:00:                                              [place]



     Meter Kit                                                      Medical



                                                                 Branch

 

     blood sugar      -0      Yes       [id number]           Use as           

[facility]



     diagnostic      1-12                               directed           ity o

f



     strip      00:00:                                              [place]



                                                               [facility]



                                                                 Branch

 

     Syringe,      -0      Yes       [id number]           Use as           [facility]

vers



     Disposable,      1-12                               directed           ity 

of



     3 mL Syrg      00:00:                                              [place]



                                                               [facility]



                                                                 [facility]anch

 

     lancets 23      -0      Yes       [id number]           Use as           U

nivers



     gauge Misc      1-12                               directed           ity o

f



               00:00:                                              [place]



                                                               [facility]



                                                                 [facility]anch

 

     Blood      -0      Yes       [id number]           Use as           [facility]

s



     Glucose      1-12                               directed           ity of



     Strip-Disp      00:00:                                              [place]



     Meter Kit                                                      Medical



                                                                 Branch

 

     blood sugar      -0      Yes       [id number]           Use as           

[facility]



     diagnostic      1-12                               directed           ity o

f



     strip      00:00:                                              [place]



                                                               [facility]



                                                                 Branch

 

     Syringe,      -0      Yes       [id number]           Use as           [facility]

vers



     Disposable,      1-12                               directed           ity 

of



     3 mL Syrg      00:00:                                              [place]



                                                               [facility]



                                                                 [facility]anch

 

     lancets 23      -0      Yes       [id number]           Use as           U

nivers



     gauge Misc      1-12                               directed           ity o

f



               00:00:                                              [place]



                                                               [facility]



                                                                 [facility]anch

 

     Blood      -0      Yes       [id number]           Use as           Un[facility]

s



     Glucose      1-12                               directed           ity of



     Strip-Disp      00:00:                                              [place]



     Meter Kit      00                                                Medical



                                                                 Branch

 

     blood sugar      -0      Yes       [id number]           Use as           

[facility]



     diagnostic      1-12                               directed           ity o

f



     strip      00:00:                                              [place]



                                                               [facility]



                                                                 Branch

 

     Syringe,      -0      Yes       [id number]           Use as           [facility]

vers



     Disposable,      1-12                               directed           ity 

of



     3 mL Syrg      00:00:                                              [place]



                                                               [facility]



                                                                 Branch

 

     lancets 23      -0      Yes       [id number]           Use as           U

nivers



     gauge Misc      1-12                               directed           ity o

f



               00:00:                                              [place]



                                                               [facility]



                                                                 Branch

 

     Blood      -0      Yes       [id number]           Use as           [facility]

s



     Glucose      1-12                               directed           ity of



     Strip-Disp      00:00:                                              [place]



     Meter Kit                                                      Medical



                                                                 Branch

 

     blood sugar      -0      Yes       [id number]           Use as           

[facility]



     diagnostic      1-12                               directed           ity o

f



     strip      00:00:                                              [place]



                                                               [facility]



                                                                 Branch

 

     Syringe,      -0      Yes       [id number]           Use as           [facility]

vers



     Disposable,      1-12                               directed           ity 

of



     3 mL Syrg      00:00:                                              [place]



                                                               [facility]



                                                                 Branch

 

     lancets 23      -0      Yes       [id number]           Use as           U

nivers



     gauge Misc      1-12                               directed           ity o

f



               00:00:                                              [place]



                                                               [facility]



                                                                 Branch

 

     Blood      -0      Yes       [id number]           Use as           Un[facility]

s



     Glucose      1-12                               directed           ity of



     Strip-Disp      00:00:                                              [place]



     Meter Kit                                                      Medical



                                                                 Branch

 

     blood sugar      -0      Yes       [id number]           Use as           

[facility]



     diagnostic      1-12                               directed           ity o

f



     strip      00:00:                                              [place]



                                                               [facility]



                                                                 Branch

 

     Syringe,      -0      Yes       [id number]           Use as           [facility]

vers



     Disposable,      1-12                               directed           ity 

of



     3 mL Syrg      00:00:                                              [place]



                                                               [facility]



                                                                 Branch

 

     lancets 23      -0      Yes       [id number]           Use as           U

nivers



     gauge Misc      1-12                               directed           ity o

f



               00:00:                                              [place]



                                                               [facility]



                                                                 Branch

 

     Blood      -0      Yes       [id number]           Use as           Un[facility]

s



     Glucose      1-12                               directed           ity of



     Strip-Disp      00:00:                                              [place]



     Meter Kit                                                      Medical



                                                                 Branch

 

     blood sugar      -0      Yes       [id number]           Use as           

[facility]



     diagnostic      1-12                               directed           ity o

f



     strip      00:00:                                              [place]



                                                               [facility]



                                                                 Branch

 

     Syringe,      -0      Yes       [id number]           Use as           [facility]

vers



     Disposable,      1-12                               directed           ity 

of



     3 mL Syrg      00:00:                                              [place]



                                                               [facility]



                                                                 Branch

 

     lancets 23      -0      Yes       [id number]           Use as           U

nivers



     gauge Misc      1-12                               directed           ity o

f



               00:00:                                              [place]



                                                               [facility]



                                                                 Branch

 

     Blood      -0      Yes       [id number]           Use as           [facility]

s



     Glucose      1-12                               directed           ity of



     Strip-Disp      00:00:                                              [place]



     Meter Kit                                                      Medical



                                                                 Branch

 

     blood sugar      -0      Yes       [id number]           Use as           

[facility]



     diagnostic      1-12                               directed           ity o

f



     strip      00:00:                                              [place]



               00                                                Medical



                                                                 Branch

 

     Syringe,      -0      Yes       [id number]           Use as           [facility]

vers



     Disposable,      1-12                               directed           ity 

of



     3 mL Syrg      00:00:                                              [place]



               00                                                Medical



                                                                 Branch

 

     lancets 2021-0      Yes       [id number]           Use as           [facility]

n[facility]



     gauge Misc      1-12                               directed           ity o

f



               00:00:                                              [place]



                                                               [facility]



                                                                 Branch

 

     Blood      -0      Yes       [id number]           Use as           [facility]

s



     Glucose      1-12                               directed           ity of



     Strip-Disp      00:00:                                              [place]



     Meter Kit                                                      Medical



                                                                 Branch

 

     blood sugar      -0      Yes       [id number]           Use as           

[facility]



     diagnostic      1-12                               directed           ity o

f



     strip      00:00:                                              [place]



                                                               [facility]



                                                                 Branch

 

     Syringe,      -0      Yes       [id number]           Use as           Uni

vers



     Disposable,      1-12                               directed           ity 

of



     3 mL Syrg      00:00:                                              [place]



               00                                                Medical



                                                                 Branch

 

     lancets 0      Yes       [id number]           Use as           U

nivers



     gauge Misc      1-12                               directed           ity o

f



               00:00:                                              [place]



               00                                                Medical



                                                                 Branch

 

     Blood      -0      Yes       [id number]           Use as           Univer

s



     Glucose      1-12                               directed           ity of



     Strip-Disp      00:00:                                              [place]



     Meter Kit                                                      Medical



                                                                 Branch

 

     blood sugar      -0      Yes       [id number]           Use as           

[facility]



     diagnostic      1-12                               directed           ity o

f



     strip      00:00:                                              [place]



               00                                                Medical



                                                                 Branch

 

     Syringe,      -0      Yes       [id number]           Use as           Uni

vers



     Disposable,      1-12                               directed           ity 

of



     3 mL Syrg      00:00:                                              [place]



                                                               [facility]



                                                                 Branch

 

     lancets 23      -0      Yes       [id number]           Use as           U

nivers



     gauge Misc      1-12                               directed           ity o

f



               00:00:                                              [place]



                                                               [facility]



                                                                 Branch

 

     Blood      -0      Yes       [id number]           Use as           Univer

s



     Glucose      1-12                               directed           ity of



     Strip-Disp      00:00:                                              [place]



     Meter Kit                                                      Medical



                                                                 Branch

 

     blood sugar      -0      Yes       [id number]           Use as           

[facility]



     diagnostic      1-12                               directed           ity o

f



     strip      00:00:                                              [place]



                                                               [facility]



                                                                 Branch

 

     Syringe,      -0      Yes       [id number]           Use as           Uni

vers



     Disposable,      1-12                               directed           ity 

of



     3 mL Syrg      00:00:                                              [place]



                                                               [facility]



                                                                 Branch

 

     lancets 23      -0      Yes       [id number]           Use as           U

nivers



     gauge Misc      1-12                               directed           ity o

f



               00:00:                                              [place]



                                                               [facility]



                                                                 Branch

 

     Blood      -0      Yes       [id number]           Use as           Univer

s



     Glucose      1-12                               directed           ity of



     Strip-Disp      00:00:                                              [place]



     Meter Kit                                                      Medical



                                                                 Branch

 

     blood sugar      -0      Yes       [id number]           Use as           

[facility]



     diagnostic      1-12                               directed           ity o

f



     strip      00:00:                                              [place]



               00                                                Medical



                                                                 Branch

 

     Syringe,      -0      Yes       [id number]           Use as           Uni

vers



     Disposable,      1-12                               directed           ity 

of



     3 mL Syrg      00:00:                                              [place]



                                                               [facility]



                                                                 Branch

 

     lancets 2021-0      Yes       [id number]           Use as           U

nivers



     gauge Misc      1-12                               directed           ity o

f



               00:00:                                              [place]



                                                               [facility]



                                                                 Branch

 

     Blood      -0      Yes       [id number]           Use as           Univer

s



     Glucose      1-12                               directed           ity of



     Strip-Disp      00:00:                                              [place]



     Meter Kit                                                      Medical



                                                                 Branch

 

     blood sugar      -0      Yes       [id number]           Use as           

[facility]



     diagnostic      1-12                               directed           ity o

f



     strip      00:00:                                              [place]



                                                               [facility]



                                                                 Branch

 

     Syringe,      -0      Yes       [id number]           Use as           Uni

vers



     Disposable,      1-12                               directed           ity 

of



     3 mL Syrg      00:00:                                              [place]



                                                               [facility]



                                                                 Branch

 

     lancets 2021-0      Yes       [id number]           Use as           [facility]

nivers



     gauge Misc      1-12                               directed           ity o

f



               00:00:                                              [place]



                                                               [facility]



                                                                 Branch

 

     Blood      -0      Yes       [id number]           Use as           Univer

s



     Glucose      1-12                               directed           ity of



     Strip-Disp      00:00:                                              [place]



     Meter Kit                                                      Medical



                                                                 Branch

 

     blood sugar      -0      Yes       [id number]           Use as           

[facility]



     diagnostic      1-12                               directed           ity o

f



     strip      00:00:                                              [place]



                                                               [facility]



                                                                 Branch

 

     Syringe,      -0      Yes       [id number]           Use as           Uni

vers



     Disposable,      1-12                               directed           ity 

of



     3 mL Syrg      00:00:                                              [place]



                                                               [facility]



                                                                 Branch

 

     lancets 23      -0      Yes       [id number]           Use as           U

nivers



     gauge Misc      1-12                               directed           ity o

f



               00:00:                                              [place]



                                                               [facility]



                                                                 Branch

 

     Blood      -0      Yes       [id number]           Use as           Univer

s



     Glucose      1-12                               directed           ity of



     Strip-Disp      00:00:                                              [place]



     Meter Kit                                                      Medical



                                                                 Branch

 

     blood sugar      -0      Yes       [id number]           Use as           

[facility]



     diagnostic      1-12                               directed           ity o

f



     strip      00:00:                                              [place]



                                                               [facility]



                                                                 Branch

 

     Syringe,      -0      Yes       [id number]           Use as           Uni

vers



     Disposable,      1-12                               directed           ity 

of



     3 mL Syrg      00:00:                                              [place]



                                                               [facility]



                                                                 Branch

 

     lancets 23      -0      Yes       [id number]           Use as           U

nivers



     gauge Misc      1-12                               directed           ity o

f



               00:00:                                              [place]



                                                               [facility]



                                                                 Branch

 

     Blood      -0      Yes       [id number]           Use as           Univer

s



     Glucose      1-12                               directed           ity of



     Strip-Disp      00:00:                                              [place]



     Meter Kit      00                                                Medical



                                                                 Branch

 

     blood sugar      -0      Yes       [id number]           Use as           

[facility]



     diagnostic      1-12                               directed           ity o

f



     strip      00:00:                                              [place]



                                                               [facility]



                                                                 Branch

 

     Syringe,      -0      Yes       [id number]           Use as           Uni

vers



     Disposable,      1-12                               directed           ity 

of



     3 mL Syrg      00:00:                                              [place]



                                                               [facility]



                                                                 Branch

 

     lancets 23      -0      Yes       [id number]           Use as           U

nivers



     gauge Misc      1-12                               directed           ity o

f



               00:00:                                              [place]



                                                               [facility]



                                                                 Branch

 

     Blood      -0      Yes       [id number]           Use as           Univer

s



     Glucose      1-12                               directed           ity of



     Strip-Disp      00:00:                                              [place]



     Meter Kit                                                      Medical



                                                                 Branch

 

     blood sugar      -0      Yes       [id number]           Use as           

[facility]



     diagnostic      1-12                               directed           ity o

f



     strip      00:00:                                              [place]



                                                               [facility]



                                                                 Branch

 

     Syringe,      -0      Yes       [id number]           Use as           Uni

vers



     Disposable,      1-12                               directed           ity 

of



     3 mL Syrg      00:00:                                              [place]



                                                               [facility]



                                                                 Branch

 

     lancets 23      -0      Yes       [id number]           Use as           U

nivers



     gauge Misc      1-12                               directed           ity o

f



               00:00:                                              [place]



                                                               [facility]



                                                                 Branch

 

     Blood      -0      Yes       [id number]           Use as           Univer

s



     Glucose      1-12                               directed           ity of



     Strip-Disp      00:00:                                              [place]



     Meter Kit                                                      Medical



                                                                 Branch

 

     blood sugar      -0      Yes       [id number]           Use as           

[facility]



     diagnostic      1-12                               directed           ity o

f



     strip      00:00:                                              [place]



               00                                                Medical



                                                                 Branch

 

     Syringe,      -0      Yes       [id number]           Use as           Uni

vers



     Disposable,      1-12                               directed           ity 

of



     3 mL Syrg      00:00:                                              [place]



                                                               [facility]



                                                                 Branch

 

     lancets 23      -0      Yes       [id number]           Use as           U

nivers



     gauge Misc      1-12                               directed           ity o

f



               00:00:                                              [place]



                                                               [facility]



                                                                 Branch

 

     Blood      -0      Yes       [id number]           Use as           Univer

s



     Glucose      1-12                               directed           ity of



     Strip-Disp      00:00:                                              [place]



     Meter Kit      00                                                Medical



                                                                 Branch

 

     blood sugar      -0      Yes       [id number]           Use as           

[facility]



     diagnostic      1-12                               directed           ity o

f



     strip      00:00:                                              [place]



               00                                                Medical



                                                                 Branch

 

     Syringe,      2021-0      Yes       [id number]           Use as           Uni

vers



     Disposable,      1-12                               directed           ity 

of



     3 mL Syrg      00:00:                                              [place]



                                                               [facility]



                                                                 Branch

 

     lancets 23      -0      Yes       [id number]           Use as           U

nivers



     gauge Misc      1-12                               directed           ity o

f



               00:00:                                              [place]



                                                               [facility]



                                                                 Branch

 

     Blood      -0      Yes       [id number]           Use as           Univer

s



     Glucose      1-12                               directed           ity of



     Strip-Disp      00:00:                                              [place]



     Meter Kit                                                      Medical



                                                                 Branch

 

     blood sugar      -0      Yes       [id number]           Use as           

[facility]



     diagnostic      1-12                               directed           ity o

f



     strip      00:00:                                              [place]



                                                               [facility]



                                                                 Branch

 

     Syringe,      0      Yes       [id number]           Use as           Uni

vers



     Disposable,      1-12                               directed           ity 

of



     3 mL Syrg      00:00:                                              [place]



               00                                                Medical



                                                                 Branch

 

     lancets 0      Yes       [id number]           Use as           U

nivers



     gauge Misc      1-12                               directed           ity o

f



               00:00:                                              [place]



                                                               [facility]



                                                                 Branch

 

     Blood      0      Yes       [id number]           Use as           Univer

s



     Glucose      1-12                               directed           ity of



     Strip-Disp      00:00:                                              [place]



     Meter Kit                                                      Medical



                                                                 Branch

 

     blood sugar      -0      Yes       [id number]           Use as           

[facility]



     diagnostic      1-12                               directed           ity o

f



     strip      00:00:                                              [place]



                                                               [facility]



                                                                 Branch

 

     Syringe,      0      Yes       [id number]           Use as           Uni

vers



     Disposable,      1-12                               directed           ity 

of



     3 mL Syrg      00:00:                                              [place]



                                                               [facility]



                                                                 Branch

 

     lancets 23      0      Yes       [id number]           Use as           U

nivers



     gauge Misc      1-12                               directed           ity o

f



               00:00:                                              [place]



                                                               [facility]



                                                                 Branch

 

     Blood      -0      Yes       [id number]           Use as           Univer

s



     Glucose      1-12                               directed           ity of



     Strip-Disp      00:00:                                              [place]



     Meter Kit                                                      Medical



                                                                 Branch

 

     blood sugar      -0      Yes       [id number]           Use as           

[facility]



     diagnostic      1-12                               directed           ity o

f



     strip      00:00:                                              [place]



                                                               [facility]



                                                                 Branch

 

     Syringe,      -0      Yes       [id number]           Use as           Uni

vers



     Disposable,      1-12                               directed           ity 

of



     3 mL Syrg      00:00:                                              [place]



                                                               [facility]



                                                                 Branch

 

     lancets 23      -0      Yes       [id number]           Use as           U

nivers



     gauge Misc      1-12                               directed           ity o

f



               00:00:                                              [place]



                                                               [facility]



                                                                 Branch

 

     Blood      -0      Yes       [id number]           Use as           Univer

s



     Glucose      1-12                               directed           ity of



     Strip-Disp      00:00:                                              [place]



     Meter Kit                                                      Medical



                                                                 Branch

 

     blood sugar      -0      Yes       [id number]           Use as           

[facility]



     diagnostic      1-12                               directed           ity o

f



     strip      00:00:                                              [place]



                                                               [facility]



                                                                 Branch

 

     Syringe,      -0      Yes       [id number]           Use as           Uni

vers



     Disposable,      1-12                               directed           ity 

of



     3 mL Syrg      00:00:                                              [place]



                                                               [facility]



                                                                 Branch

 

     lancets 2021-0      Yes       [id number]           Use as           U

nivers



     gauge Misc      1-12                               directed           ity o

f



               00:00:                                              [place]



                                                               Medical



                                                                 Branch

 

     Blood      -0      Yes       [id number]           Use as           [facility]

s



     Glucose      1-12                               directed           ity of



     Strip-Disp      00:00:                                              [place]



     Meter Kit                                                      Medical



                                                                 Branch

 

     blood sugar      0      Yes       [id number]           Use as           

[facility]



     diagnostic      1-12                               directed           ity o

f



     strip      00:00:                                              [place]



               00                                                Medical



                                                                 Branch

 

     Syringe,      0      Yes       [id number]           Use as           [facility]

vers



     Disposable,      1-12                               directed           ity 

of



     3 mL Syrg      00:00:                                              [place]



               00                                                Medical



                                                                 Branch

 

     lancets 2021-0      Yes       [id number]           Use as           U

nivers



     gauge Misc      1-12                               directed           ity o

f



               00:00:                                              [place]



                                                               Medical



                                                                 Branch

 

     Blood      -0      Yes       [id number]           Use as           [facility]

s



     Glucose      1-12                               directed           ity of



     Strip-Disp      00:00:                                              [place]



     Meter Kit                                                      Medical



                                                                 Branch

 

     blood sugar      -0      Yes       [id number]           Use as           

[facility]



     diagnostic      1-12                               directed           ity o

f



     strip      00:00:                                              [place]



                                                               Medical



                                                                 Branch

 

     Syringe,      -0      Yes       [id number]           Use as           [facility]

vers



     Disposable,      1-12                               directed           ity 

of



     3 mL Syrg      00:00:                                              [place]



                                                               Medical



                                                                 Branch

 

     lancets 23      -0      Yes       [id number]           Use as           U

nivers



     gauge Misc      1-12                               directed           ity o

f



               00:00:                                              [place]



                                                               Medical



                                                                 Branch

 

     Blood      -0      Yes       [id number]           Use as           [facility]

s



     Glucose      1-12                               directed           ity of



     Strip-Disp      00:00:                                              [place]



     Meter Kit                                                      Medical



                                                                 Branch

 

     blood sugar      -0      Yes       [id number]           Use as           

[facility]



     diagnostic      1-12                               directed           ity o

f



     strip      00:00:                                              [place]



                                                               Medical



                                                                 Branch

 

     Syringe,      -0      Yes       [id number]           Use as           Un[facility]

vers



     Disposable,      1-12                               directed           ity 

of



     3 mL Syrg      00:00:                                              [place]



                                                               Medical



                                                                 Branch

 

     lancets 23      -0      Yes       [id number]           Use as           U

nivers



     gauge Misc      1-12                               directed           ity o

f



               00:00:                                              [place]



                                                               Medical



                                                                 Branch

 

     Blood      -0      Yes       [id number]           Use as           [facility]

s



     Glucose      1-12                               directed           ity of



     Strip-Disp      00:00:                                              [place]



     Meter Kit      00                                                Medical



                                                                 Branch

 

     blood sugar      -0      Yes       [id number]           Use as           

[facility]



     diagnostic      1-12                               directed           ity o

f



     strip      00:00:                                              [place]



                                                               Medical



                                                                 Branch

 

     Syringe,      -0      Yes       [id number]           Use as           Un[facility]

vers



     Disposable,      1-12                               directed           ity 

of



     3 mL Syrg      00:00:                                              [place]



                                                               Medical



                                                                 Branch

 

     lancets 23      -0      Yes       [id number]           Use as           U

nivers



     gauge Misc      1-12                               directed           ity o

f



               00:00:                                              [place]



                                                               Medical



                                                                 Branch

 

     Blood      -0      Yes       [id number]           Use as           [facility]

s



     Glucose      1-12                               directed           ity of



     Strip-Disp      00:00:                                              [place]



     Meter Kit                                                      Medical



                                                                 Branch

 

     blood sugar      -0      Yes       [id number]           Use as           

[facility]



     diagnostic      1-12                               directed           ity o

f



     strip      00:00:                                              [place]



                                                               Medical



                                                                 Branch

 

     Syringe,      -0      Yes       [id number]           Use as           [facility]

vers



     Disposable,      1-12                               directed           ity 

of



     3 mL Syrg      00:00:                                              [place]



                                                               Medical



                                                                 Branch

 

     lancets 23      -0      Yes       [id number]           Use as           U

nivers



     gauge Misc      1-12                               directed           ity o

f



               00:00:                                              [place]



                                                               Medical



                                                                 Branch

 

     Blood      -0      Yes       [id number]           Use as           [facility]

s



     Glucose      1-12                               directed           ity of



     Strip-Disp      00:00:                                              [place]



     Meter Kit                                                      Medical



                                                                 Branch

 

     blood sugar      -0      Yes       [id number]           Use as           

[facility]



     diagnostic      1-12                               directed           ity o

f



     strip      00:00:                                              [place]



                                                               Medical



                                                                 Branch

 

     Syringe,      -0      Yes       [id number]           Use as           Un[facility]

vers



     Disposable,      1-12                               directed           ity 

of



     3 mL Syrg      00:00:                                              [place]



                                                               Medical



                                                                 Branch

 

     lancets 23      -0      Yes       [id number]           Use as           U

nivers



     gauge Misc      1-12                               directed           ity o

f



               00:00:                                              [place]



                                                               [facility]



                                                                 Branch

 

     Blood      -0      Yes       [id number]           Use as           [facility]

s



     Glucose      1-12                               directed           ity of



     Strip-Disp      00:00:                                              [place]



     Meter Kit      00                                                Medical



                                                                 Branch

 

     blood sugar      -0      Yes       [id number]           Use as           

[facility]



     diagnostic      1-12                               directed           ity o

f



     strip      00:00:                                              [place]



                                                               Medical



                                                                 Branch

 

     Syringe,      2021-0      Yes       [id number]           Use as           [facility]

vers



     Disposable,      1-12                               directed           ity 

of



     3 mL Syrg      00:00:                                              [place]



                                                               Medical



                                                                 Branch

 

     lancets 23      -0      Yes       [id number]           Use as           U

nivers



     gauge Misc      1-12                               directed           ity o

f



               00:00:                                              [place]



                                                               Medical



                                                                 Branch

 

     Blood      -0      Yes       [id number]           Use as           [facility]

s



     Glucose      1-12                               directed           ity of



     Strip-Disp      00:00:                                              [place]



     Meter Kit                                                      Medical



                                                                 Branch

 

     blood sugar      -0      Yes       [id number]           Use as           

[facility]



     diagnostic      1-12                               directed           ity o

f



     strip      00:00:                                              [place]



                                                               Medical



                                                                 Branch

 

     Syringe,      0      Yes       [id number]           Use as           [facility]

vers



     Disposable,      1-12                               directed           ity 

of



     3 mL Syrg      00:00:                                              [place]



                                                               [facility]



                                                                 Branch

 

     lancets 0      Yes       [id number]           Use as           U

nivers



     gauge Misc      1-12                               directed           ity o

f



               00:00:                                              [place]



                                                               Medical



                                                                 Branch

 

     Blood      0      Yes       [id number]           Use as           [facility]

s



     Glucose      1-12                               directed           ity of



     Strip-Disp      00:00:                                              [place]



     Meter Kit                                                      Medical



                                                                 Branch

 

     blood sugar      0      Yes       [id number]           Use as           

[facility]



     diagnostic      1-12                               directed           ity o

f



     strip      00:00:                                              [place]



                                                               Medical



                                                                 Branch

 

     Syringe,      0      Yes       [id number]           Use as           [facility]

vers



     Disposable,      1-12                               directed           ity 

of



     3 mL Syrg      00:00:                                              [place]



                                                               [facility]



                                                                 Branch

 

     lancets 0      Yes       [id number]           Use as           U

nivers



     gauge Misc      1-12                               directed           ity o

f



               00:00:                                              [place]



                                                               Medical



                                                                 Branch

 

     Blood      0      Yes       [id number]           Use as           [facility]

s



     Glucose      1-12                               directed           ity of



     Strip-Disp      00:00:                                              [place]



     Meter Kit                                                      Medical



                                                                 Branch

 

     blood sugar      -0      Yes       [id number]           Use as           

[facility]



     diagnostic      1-12                               directed           ity o

f



     strip      00:00:                                              [place]



               00                                                Medical



                                                                 Branch

 

     Syringe,      -0      Yes       [id number]           Use as           [facility]

vers



     Disposable,      1-12                               directed           ity 

of



     3 mL Syrg      00:00:                                              [place]



                                                               [facility]



                                                                 Branch

 

     lancets 23      -0      Yes       [id number]           Use as           U

nivers



     gauge Misc      1-12                               directed           ity o

f



               00:00:                                              [place]



                                                               Medical



                                                                 Branch

 

     Blood      -0      Yes       [id number]           Use as           [facility]

s



     Glucose      1-12                               directed           ity of



     Strip-Disp      00:00:                                              [place]



     Meter Kit      00                                                Medical



                                                                 Branch

 

     blood sugar      -0      Yes       [id number]           Use as           

[facility]



     diagnostic      1-12                               directed           ity o

f



     strip      00:00:                                              [place]



                                                               [facility]



                                                                 Branch

 

     Syringe,      -0      Yes       [id number]           Use as           [facility]

vers



     Disposable,      1-12                               directed           ity 

of



     3 mL Syrg      00:00:                                              [place]



                                                               Medical



                                                                 Branch

 

     lancets 2021-0      Yes       [id number]           Use as           U

nivers



     gauge Misc      1-12                               directed           ity o

f



               00:00:                                              [place]



                                                               Medical



                                                                 Branch

 

     Blood      -0      Yes       [id number]           Use as           [facility]

s



     Glucose      1-12                               directed           ity of



     Strip-Disp      00:00:                                              [place]



     Meter Kit                                                      Medical



                                                                 Branch

 

     blood sugar      0      Yes       [id number]           Use as           

[facility]



     diagnostic      1-12                               directed           ity o

f



     strip      00:00:                                              [place]



               00                                                Medical



                                                                 Branch

 

     Syringe,      0      Yes       [id number]           Use as           [facility]

vers



     Disposable,      1-12                               directed           ity 

of



     3 mL Syrg      00:00:                                              [place]



               00                                                Medical



                                                                 Branch

 

     lancets 0      Yes       [id number]           Use as           U

nivers



     gauge Misc      1-12                               directed           ity o

f



               00:00:                                              [place]



                                                               Medical



                                                                 Branch

 

     Blood      -0      Yes       [id number]           Use as           [facility]

s



     Glucose      1-12                               directed           ity of



     Strip-Disp      00:00:                                              [place]



     Meter Kit                                                      Medical



                                                                 Branch

 

     blood sugar      -0      Yes       [id number]           Use as           

[facility]



     diagnostic      1-12                               directed           ity o

f



     strip      00:00:                                              [place]



                                                               Medical



                                                                 Branch

 

     Syringe,      0      Yes       [id number]           Use as           [facility]

vers



     Disposable,      1-12                               directed           ity 

of



     3 mL Syrg      00:00:                                              [place]



                                                               [facility]



                                                                 Branch

 

     lancets 23      -0      Yes       [id number]           Use as           U

nivers



     gauge Misc      1-12                               directed           ity o

f



               00:00:                                              [place]



                                                               Medical



                                                                 Branch

 

     Blood      -0      Yes       [id number]           Use as           [facility]

s



     Glucose      1-12                               directed           ity of



     Strip-Disp      00:00:                                              [place]



     Meter Kit                                                      Medical



                                                                 Branch

 

     blood sugar      -0      Yes       [id number]           Use as           

[facility]



     diagnostic      1-12                               directed           ity o

f



     strip      00:00:                                              [place]



                                                               Medical



                                                                 Branch

 

     Syringe,      -0      Yes       [id number]           Use as           [facility]

vers



     Disposable,      1-12                               directed           ity 

of



     3 mL Syrg      00:00:                                              [place]



                                                               Medical



                                                                 Branch

 

     lancets 23      -0      Yes       [id number]           Use as           U

nivers



     gauge Misc      1-12                               directed           ity o

f



               00:00:                                              [place]



                                                               Medical



                                                                 Branch

 

     Blood      -0      Yes       [id number]           Use as           [facility]

s



     Glucose      1-12                               directed           ity of



     Strip-Disp      00:00:                                              [place]



     Meter Kit      00                                                Medical



                                                                 Branch

 

     blood sugar      -0      Yes       [id number]           Use as           

[facility]



     diagnostic      1-12                               directed           ity o

f



     strip      00:00:                                              [place]



               00                                                Medical



                                                                 Branch

 

     Syringe,      -0      Yes       [id number]           Use as           Un[facility]

vers



     Disposable,      1-12                               directed           ity 

of



     3 mL Syrg      00:00:                                              [place]



                                                               Medical



                                                                 Branch

 

     lancets 23      -0      Yes       [id number]           Use as           U

nivers



     gauge Misc      1-12                               directed           ity o

f



               00:00:                                              [place]



                                                               Medical



                                                                 Branch

 

     Blood      -0      Yes       [id number]           Use as           Un[facility]

s



     Glucose      1-12                               directed           ity of



     Strip-Disp      00:00:                                              [place]



     Meter [facility]                                                      Medical



                                                                 Branch

 

     blood sugar      -0      Yes       [id number]           Use as           

[facility]



     diagnostic      1-12                               directed           ity o

f



     strip      00:00:                                              [place]



                                                               Medical



                                                                 Branch

 

     Syringe,      -0      Yes       [id number]           Use as           Uni

vers



     Disposable,      1-12                               directed           ity 

of



     3 mL Syrg      00:00:                                              [place]



                                                               Medical



                                                                 Branch

 

     lancets 23      -0      Yes       [id number]           Use as           U

nivers



     gauge Misc      1-12                               directed           ity o

f



               00:00:                                              [place]



                                                               Medical



                                                                 Branch

 

     Blood      -0      Yes       [id number]           Use as           Univer

s



     Glucose      1-12                               directed           ity of



     Strip-Disp      00:00:                                              [place]



     Meter Kit      00                                                Medical



                                                                 Branch

 

     blood sugar      -0      Yes       [id number]           Use as           

[facility]



     diagnostic      1-12                               directed           ity o

f



     strip      00:00:                                              [place]



               00                                                Medical



                                                                 Branch

 

     Syringe,      -0      Yes       [id number]           Use as           Uni

vers



     Disposable,      1-12                               directed           ity 

of



     3 mL Syrg      00:00:                                              [place]



                                                               Medical



                                                                 Branch

 

     lancets 23      -0      Yes       [id number]           Use as           U

nivers



     gauge Misc      1-12                               directed           ity o

f



               00:00:                                              [place]



                                                               Medical



                                                                 Branch

 

     Blood      -0      Yes       [id number]           Use as           Univer

s



     Glucose      1-12                               directed           ity of



     Strip-Disp      00:00:                                              [place]



     Meter Kit      00                                                Medical



                                                                 Branch

 

     blood sugar      -0      Yes       [id number]           Use as           

[facility]



     diagnostic      1-12                               directed           ity o

f



     strip      00:00:                                              [place]



               00                                                Medical



                                                                 Branch

 

     Syringe,      -0      Yes       [id number]           Use as           Uni

vers



     Disposable,      1-12                               directed           ity 

of



     3 mL Syrg      00:00:                                              [place]



               00                                                [facility]

 

     lancets 23      -0      Yes       [id number]           Use as           U

nivers



     gauge Misc      1-12                               directed           ity o

f



               00:00:                                              [place]



               00                                                [facility]

 

     Blood      -0      Yes       [id number]           Use as           Univer

s



     Glucose      1-12                               directed           ity of



     Strip-Disp      00:00:                                              [place]



     Meter Kit      00                                                [facility]

 

     blood sugar      -0      Yes       [id number]           Use as           

Univers



     diagnostic      1-12                               directed           ity o

f



     strip      00:00:                                              [place]



               00                                                [facility]

 

     Syringe,      -0      Yes       [id number]           Use as           Uni

vers



     Disposable,      1-12                               directed           ity 

of



     3 mL Syrg      00:00:                                              [address]







Immunizations







           Ordered    Filled Immunization Date       Status     Comments   Sourc

e



           Immunization Name Name                                        

 

           TDAP                  2023 Completed             [facility]



                                 00:00:00                         [facility]

 

           TDAP                  2023 Completed             [facility]



                                 00:00:00                         [facility]

 

           TDAP                  2023 Completed             [facility]



                                 00:00:00                         [facility]

 

           TDAP                  2023 Completed             [facility]



                                 00:00:00                         [facility]

 

           TDAP                  2023 Completed             [facility]



                                 00:00:00                         [facility]

 

           TDAP                  2023 Completed             [facility]



                                 00:00:00                         [facility]

 

           TDAP                  2023 Completed             [facility]



                                 00:00:00                         [facility]

 

           TDAP                  2023 Completed             [facility]



                                 00:00:00                         [facility]

 

           TDAP                  2023 Completed             [facility]



                                 00:00:00                         [facility]

 

           TDAP                  2023 Completed             [facility]



                                 00:00:00                         [facility]

 

           TDAP                  2023 Completed             [facility]



                                 00:00:00                         [facility]

 

           TDAP                  2023 Completed             [facility]



                                 00:00:00                         [facility]

 

           TDAP                  2023 Completed             [facility]



                                 00:00:00                         [facility]

 

           TDAP                  2023 Completed             [facility]



                                 00:00:00                         [facility]

 

           TDAP                  2023 Completed             [facility]



                                 00:00:00                         [facility]

 

           TDAP                  2023 Completed             [facility]



                                 00:00:00                         [facility]

 

           TDAP                  2023 Completed             [facility]



                                 00:00:00                         [facility]

 

           TDAP                  2023 Completed             [facility]



                                 00:00:00                         [facility]

 

           TDAP                  2023 Completed             [facility]



                                 00:00:00                         [facility]

 

           TDAP                  2023 Completed             [facility]



                                 00:00:00                         [facility]

 

           TDAP                  2023 Completed             [facility]



                                 00:00:00                         [facility]

 

           TDAP                  2023 Completed             [facility]



                                 00:00:00                         [facility]

 

           TDAP                  2023 Completed             [facility]



                                 00:00:00                         [facility]

 

           TDAP                  2023 Completed             [facility]



                                 00:00:00                         [facility]

 

           TDAP                  2023 Completed             [facility]



                                 00:00:00                         [facility]

 

           TDAP                  2023 Completed             [facility]



                                 00:00:00                         [facility]

 

           TDAP                  2023 Completed             [facility]



                                 00:00:00                         [facility]

 

           TDAP                  2023 Completed             [facility]



                                 00:00:00                         [facility]

 

           TDAP                  2023 Completed             [facility]



                                 00:00:00                         [facility]

 

           TDAP                  2023 Completed             [facility]



                                 00:00:00                         [facility]

 

           TDAP                  2023 Completed             [facility]



                                 00:00:00                         [facility]

 

           TDAP                  2023 Completed             [facility]



                                 00:00:00                         [facility]

 

           Influenza Virus            2022-10-28 Completed             Univers[facility]

y of



           Vaccine Quad IM,            00:00:00                         [place] [facility]

dical



           Preserv and ABX                                             Branch



           Free 6 MO-64 YRS                                             

 

           SARS-COV-2 COVID-19            2022-10-28 Completed             Unive

rsity of



           [facility]            00:00:00                         [place] [facility]

l



           VACCINE 12 YRS+,                                             Branch



           BIVALENT 0.3ML, IM,                                             



           (PFIZER GRAY TOP                                             



           BOOSTER)                                               

 

           Influenza Virus            2022-10-28 Completed             Universit

y of



           Vaccine Quad IM,            00:00:00                         [place] [facility]

dical



           Preserv and ABX                                             Branch



           Free 6 MO-64 YRS                                             

 

           SARS-COV-2 COVID-19            2022-10-28 Completed             Unive

rsity of



           [facility]            00:00:00                         [place] [facility]

l



           VACCINE 12 YRS+,                                             Branch



           BIVALENT 0.3ML, IM,                                             



           (PFIZER GRAY TOP                                             



           BOOSTER)                                               

 

           Influenza Virus            2022-10-28 Completed             Universit

y of



           Vaccine Quad IM,            00:00:00                         [place] [facility]

dical



           Preserv and ABX                                             Branch



           Free 6 MO-64 YRS                                             

 

           SARS-COV-2 COVID-19            2022-10-28 Completed             Unive

rsity of



           [facility]            00:00:00                         [place] [facility]

l



           VACCINE 12 YRS+,                                             Branch



           BIVALENT 0.3ML, IM,                                             



           (PFIZER GRAY TOP                                             



           BOOSTER)                                               

 

           Influenza Virus            2022-10-28 Completed             Universit

y of



           Vaccine Quad IM,            00:00:00                         [place] [facility]

dical



           Preserv and ABX                                             Branch



           Free 6 MO-64 YRS                                             

 

           SARS-COV-2 COVID-19            2022-10-28 Completed             Unive

rsity of



           [facility]            00:00:00                         [place] [facility]

l



           VACCINE 12 YRS+,                                             Branch



           BIVALENT 0.3ML, IM,                                             



           (PFIZER GRAY TOP                                             



           BOOSTER)                                               

 

           Influenza Virus            2022-10-28 Completed             Universit

y of



           Vaccine Quad IM,            00:00:00                         [place] [facility]

dical



           Preserv and ABX                                             Branch



           Free 6 MO-64 YRS                                             

 

           SARS-COV-2 COVID-19            2022-10-28 Completed             Unive

rsity of



           [facility]            00:00:00                         [place] [facility]

l



           VACCINE 12 YRS+,                                             Branch



           BIVALENT 0.3ML, IM,                                             



           (PFIZER GRAY TOP                                             



           BOOSTER)                                               

 

           Influenza Virus            2022-10-28 Completed             Universit

y of



           Vaccine Quad IM,            00:00:00                         [place] [facility]

dical



           Preserv and ABX                                             Branch



           Free 6 MO-64 YRS                                             

 

           SARS-COV-2 COVID-19            2022-10-28 Completed             Unive

rsity of



           [facility]            00:00:00                         [place] [facility]

l



           VACCINE 12 YRS+,                                             Branch



           BIVALENT 0.3ML, IM,                                             



           (PFIZER GRAY TOP                                             



           BOOSTER)                                               

 

           Influenza Virus            2022-10-28 Completed             Universit

y of



           Vaccine Quad IM,            00:00:00                         [place] [facility]

dical



           Preserv and ABX                                             Branch



           Free 6 MO-64 YRS                                             

 

           SARS-COV-2 COVID-19            2022-10-28 Completed             Unive

rsity of



           [facility]            00:00:00                         [place] [facility]

l



           VACCINE 12 YRS+,                                             Branch



           BIVALENT 0.3ML, IM,                                             



           (PFIZER GRAY TOP                                             



           BOOSTER)                                               

 

           Influenza Virus            2022-10-28 Completed             Universit

y of



           Vaccine Quad IM,            00:00:00                         [place] [facility]

dical



           Preserv and ABX                                             Branch



           Free 6 MO-64 YRS                                             

 

           SARS-COV-2 COVID-19            2022-10-28 Completed             Unive

rsity of



           [facility]            00:00:00                         [place] [facility]

l



           VACCINE 12 YRS+,                                             Branch



           BIVALENT 0.3ML, IM,                                             



           (PFIZER GRAY TOP                                             



           BOOSTER)                                               

 

           Influenza Virus            2022-10-28 Completed             Universit

y of



           Vaccine Quad IM,            00:00:00                         [place] [facility]

[facility]



           Preserv and ABX                                             Branch



           Free 6 MO-64 YRS                                             

 

           SARS-COV-2 COVID-19            2022-10-28 Completed             Unive

rsity of



           [facility]            00:00:00                         [place] [facility]

l



           VACCINE 12 YRS+,                                             Branch



           BIVALENT 0.3ML, IM,                                             



           (PFIZER GRAY TOP                                             



           BOOSTER)                                               

 

           Influenza Virus            2022-10-28 Completed             Universit

y of



           Vaccine Quad IM,            00:00:00                         [place] [facility]

dical



           Preserv and ABX                                             Branch



           Free 6 MO-64 YRS                                             

 

           SARS-COV-2 COVID-19            2022-10-28 Completed             Unive

rsity of



           [facility]            00:00:00                         [place] [facility]

l



           VACCINE 12 YRS+,                                             Branch



           BIVALENT 0.3ML, IM,                                             



           (PFIZER GRAY TOP                                             



           BOOSTER)                                               

 

           Influenza Virus            2022-10-28 Completed             Universit

y of



           Vaccine Quad IM,            00:00:00                         [place] [facility]

dical



           Preserv and ABX                                             Branch



           Free 6 MO-64 YRS                                             

 

           SARS-COV-2 COVID-19            2022-10-28 Completed             Unive

rsity of



           [facility]            00:00:00                         [place] [facility]

l



           VACCINE 12 YRS+,                                             Branch



           BIVALENT 0.3ML, IM,                                             



           (PFIZER GRAY TOP                                             



           BOOSTER)                                               

 

           Influenza Virus            2022-10-28 Completed             Universit

y of



           Vaccine Quad IM,            00:00:00                         [place] [facility]

d[facility]



           Preserv and ABX                                             Branch



           Free 6 MO-64 YRS                                             

 

           SARS-COV-2 COVID-19            2022-10-28 Completed             Unive

rsity of



           [facility]            00:00:00                         [place] [facility]

l



           VACCINE 12 YRS+,                                             Branch



           BIVALENT 0.3ML, IM,                                             



           (PFIZER GRAY TOP                                             



           BOOSTER)                                               

 

           Influenza Virus            2022-10-28 Completed             Universit

y of



           Vaccine Quad IM,            00:00:00                         [place] [facility]

d[facility]



           Preserv and ABX                                             Branch



           Free 6 MO-64 YRS                                             

 

           SARS-COV-2 COVID-19            2022-10-28 Completed             Unive

rsity of



           [facility]            00:00:00                         [place] [facility]

l



           VACCINE 12 YRS+,                                             Branch



           BIVALENT 0.3ML, IM,                                             



           (PFIZER GRAY TOP                                             



           BOOSTER)                                               

 

           Influenza Virus            2022-10-28 Completed             Universit

y of



           Vaccine Quad IM,            00:00:00                         [place] [facility]

dical



           Preserv and ABX                                             Branch



           Free 6 MO-64 YRS                                             

 

           SARS-COV-2 COVID-19            2022-10-28 Completed             Unive

rsity of



           [facility]            00:00:00                         [place] [facility]

l



           VACCINE 12 YRS+,                                             Branch



           BIVALENT 0.3ML, IM,                                             



           (PFIZER GRAY TOP                                             



           BOOSTER)                                               

 

           Influenza Virus            2022-10-28 Completed             Universit

y of



           Vaccine Quad IM,            00:00:00                         [place] [facility]

dical



           Preserv and ABX                                             Branch



           Free 6 MO-64 YRS                                             

 

           SARS-COV-2 COVID-19            2022-10-28 Completed             Unive

rsity of



           [facility]            00:00:00                         [place] [facility]

l



           VACCINE 12 YRS+,                                             Branch



           BIVALENT 0.3ML, IM,                                             



           (PFIZER GRAY TOP                                             



           BOOSTER)                                               

 

           Influenza Virus            2022-10-28 Completed             Universit

y of



           Vaccine Quad IM,            00:00:00                         [place] [facility]

dical



           Preserv and ABX                                             Branch



           Free 6 MO-64 YRS                                             

 

           SARS-COV-2 COVID-19            2022-10-28 Completed             Unive

rsity of



           [facility]            00:00:00                         [place] [facility]

l



           VACCINE 12 YRS+,                                             Branch



           BIVALENT 0.3ML, IM,                                             



           (PFIZER GRAY TOP                                             



           BOOSTER)                                               

 

           Influenza Virus            2022-10-28 Completed             [facility]

[facility]



           [facility]            00:00:00                         [facility]



           Preserv and ABX                                             Branch



           Free 6 MO-64 YRS                                             

 

           SARS-COV-2 COVID-19            2022-10-28 Completed             Unive

rsity of



           [facility]            00:00:00                         [facility]

l



           VACCINE 12 YRS+,                                             Branch



           BIVALENT 0.3ML, IM,                                             



           (PFIZER GRAY TOP                                             



           BOOSTER)                                               

 

           Influenza Virus            2022-10-28 Completed             [facility]

[facility]



           [facility],            00:00:00                         [facility]



           Preserv and ABX                                             Branch



           Free 6 MO-64 YRS                                             

 

           SARS-COV-2 COVID-19            2022-10-28 Completed             [facility]

rsity of



           [facility]            00:00:00                         [facility]

l



           VACCINE 12 YRS+,                                             Branch



           BIVALENT 0.3ML, IM,                                             



           (PFIZER GRAY TOP                                             



           BOOSTER)                                               

 

           Influenza Virus            2022-10-28 Completed             [facility]

[facility]



           [facility],            00:00:00                         [facility]



           Preserv and ABX                                             Branch



           Free 6 MO-64 YRS                                             

 

           SARS-COV-2 COVID-19            2022-10-28 Completed             [facility]ive

rsity of



           [facility]            00:00:00                         [facility]

l



           VACCINE 12 YRS+,                                             Branch



           BIVALENT 0.3ML, IM,                                             



           (PFIZER GRAY TOP                                             



           BOOSTER)                                               

 

           Influenza Virus            2022-10-28 Completed             [facility]

[facility]



           [facility],            00:00:00                         [facility]



           Preserv and ABX                                             Branch



           Free 6 MO-64 YRS                                             

 

           SARS-COV-2 COVID-19            2022-10-28 Completed             [facility]ive

rsity of



           [facility]            00:00:00                         [facility]

l



           VACCINE 12 YRS+,                                             Branch



           BIVALENT 0.3ML, IM,                                             



           (PFIZER GRAY TOP                                             



           BOOSTER)                                               

 

           Influenza Virus            2022-10-28 Completed             [facility]

[facility] of



           [facility],            00:00:00                         [facility]



           Preserv and ABX                                             Branch



           Free 6 MO-64 YRS                                             

 

           SARS-COV-2 COVID-19            2022-10-28 Completed             [facility]

rsity of



           [facility]            00:00:00                         [facility]

l



           VACCINE 12 YRS+,                                             Branch



           BIVALENT 0.3ML, IM,                                             



           (PFIZER GRAY TOP                                             



           BOOSTER)                                               

 

           Influenza Virus            2022-10-28 Completed             [facility]

[facility]



           [facility],            00:00:00                         [facility]



           Preserv and ABX                                             Branch



           Free 6 MO-64 YRS                                             

 

           SARS-COV-2 COVID-19            2022-10-28 Completed             [facility]

rsity of



           [facility]            00:00:00                         [facility]

l



           VACCINE 12 YRS+,                                             Branch



           BIVALENT 0.3ML, IM,                                             



           (PFIZER GRAY TOP                                             



           BOOSTER)                                               

 

           Influenza Virus            2022-10-28 Completed             [facility]

[facility] of



           [facility],            00:00:00                         [facility]



           Preserv and ABX                                             Branch



           Free 6 MO-64 YRS                                             

 

           SARS-COV-2 COVID-19            2022-10-28 Completed             [facility]ive

rsity of



           [facility]            00:00:00                         [facility]

l



           VACCINE 12 YRS+,                                             Branch



           BIVALENT 0.3ML, IM,                                             



           (PFIZER GRAY TOP                                             



           BOOSTER)                                               

 

           Influenza Virus            2022-10-28 Completed             [facility]

[facility] of



           [facility],            00:00:00                         [facility]



           Preserv and ABX                                             Branch



           Free 6 MO-64 YRS                                             

 

           SARS-COV-2 COVID-19            2022-10-28 Completed             [facility]ive

rsity of



           [facility]            00:00:00                         [facility]

l



           VACCINE 12 YRS+,                                             Branch



           BIVALENT 0.3ML, IM,                                             



           (PFIZER GRAY TOP                                             



           BOOSTER)                                               

 

           Influenza Virus            2022-10-28 Completed             [facility]

[facility] of



           [facility],            00:00:00                         [facility]



           Preserv and ABX                                             Branch



           Free 6 MO-64 YRS                                             

 

           SARS-COV-2 COVID-19            2022-10-28 Completed             [facility]ive

rsity of



           [facility]            00:00:00                         [facility]

l



           VACCINE 12 YRS+,                                             Branch



           BIVALENT 0.3ML, IM,                                             



           (PFIZER GRAY TOP                                             



           BOOSTER)                                               

 

           Influenza Virus            2022-10-28 Completed             [facility]

[facility]



           [facility],            00:00:00                         [facility]



           Preserv and ABX                                             Branch



           Free 6 MO-64 YRS                                             

 

           SARS-COV-2 COVID-19            2022-10-28 Completed             [facility]ive

rsity of



           [facility]            00:00:00                         [facility]

l



           VACCINE 12 YRS+,                                             Branch



           BIVALENT 0.3ML, IM,                                             



           (PFIZER GRAY TOP                                             



           BOOSTER)                                               

 

           Influenza Virus            2022-10-28 Completed             [facility]

[facility]



           [facility],            00:00:00                         [facility]



           Preserv and ABX                                             Branch



           Free 6 MO-64 YRS                                             

 

           SARS-COV-2 COVID-19            2022-10-28 Completed             [facility]

rsity of



           [facility]            00:00:00                         [facility]

l



           VACCINE 12 YRS+,                                             Branch



           BIVALENT 0.3ML, IM,                                             



           (PFIZER GRAY TOP                                             



           BOOSTER)                                               

 

           Influenza Virus            2022-10-28 Completed             [facility]

[facility]



           [facility],            00:00:00                         [facility]



           Preserv and ABX                                             Branch



           Free 6 MO-64 YRS                                             

 

           SARS-COV-2 COVID-19            2022-10-28 Completed             [facility]

rs[facility] of



           [facility]            00:00:00                         [facility]

l



           VACCINE 12 YRS+,                                             Branch



           BIVALENT 0.3ML, IM,                                             



           (PFIZER GRAY TOP)                                             

 

           Influenza Virus            2022-10-28 Completed             [facility]

[facility]



           [facility],            00:00:00                         [facility]



           Preserv and ABX                                             Branch



           Free 6 MO-64 YRS                                             

 

           SARS-COV-2 COVID-19            2022-10-28 Completed             [facility]

rs[facility] of



           [facility]            00:00:00                         [facility]

l



           VACCINE 12 YRS+,                                             Branch



           BIVALENT 0.3ML, IM,                                             



           (PFIZER GRAY TOP)                                             

 

           Influenza Virus            2022-10-28 Completed             [facility]

[facility] of



           [facility],            00:00:00                         [facility]



           Preserv and ABX                                             Branch



           Free 6 MO-64 YRS                                             

 

           SARS-COV-2 COVID-19            2022-10-28 Completed             [facility]ive

rsity of



           [facility]            00:00:00                         [facility]

l



           VACCINE 12 YRS+,                                             Branch



           BIVALENT 0.3ML, IM,                                             



           (PFIZER GRAY TOP)                                             

 

           Influenza Virus            2022-10-28 Completed             [facility]

[facility] of



           [facility],            00:00:00                         [facility]



           Preserv and ABX                                             Branch



           Free 6 MO-64 YRS                                             

 

           SARS-COV-2 COVID-19            2022-10-28 Completed             [facility]ive

rsity of



           [facility]            00:00:00                         [facility]

l



           VACCINE 12 YRS+,                                             Branch



           BIVALENT 0.3ML, IM,                                             



           (PFIZER GRAY TOP)                                             

 

           Influenza Virus            2022-10-28 Completed             [facility]

[facility] of



           [facility],            00:00:00                         [facility]



           Preserv and ABX                                             Branch



           Free 6 MO-64 YRS                                             

 

           SARS-COV-2 COVID-19            2022-10-28 Completed             [facility]ive

rsity of



           [facility]            00:00:00                         [facility]

l



           VACCINE 12 YRS+,                                             Branch



           BIVALENT 0.3ML, IM,                                             



           (PFIZER GRAY TOP)                                             

 

           Influenza Virus            2022-10-28 Completed             [facility]

[facility] of



           [facility],            00:00:00                         [facility]



           Preserv and ABX                                             Branch



           Free 6 MO-64 YRS                                             

 

           SARS-COV-2 COVID-19            2022-10-28 Completed             [facility]ive

rsity of



           [facility]            00:00:00                         [facility]

l



           VACCINE 12 YRS+,                                             Branch



           BIVALENT 0.3ML, IM,                                             



           (PFIZER GRAY TOP)                                             

 

           Influenza Virus            2022-10-28 Completed             [facility]

[facility] of



           [facility],            00:00:00                         [facility]



           Preserv and ABX                                             Branch



           Free 6 MO-64 YRS                                             

 

           SARS-COV-2 COVID-19            2022-10-28 Completed             [facility]ive

rsity of



           [facility]            00:00:00                         [facility]

l



           VACCINE 12 YRS+,                                             Branch



           BIVALENT 0.3ML, IM,                                             



           (PFIZER GRAY TOP)                                             

 

           Influenza Virus            2022-10-28 Completed             [facility]

[facility] of



           [facility],            00:00:00                         [facility]



           Preserv and ABX                                             Branch



           Free 6 MO-64 YRS                                             

 

           SARS-COV-2 COVID-19            2022-10-28 Completed             [facility]ive

rsity of



           [facility]            00:00:00                         [facility]

l



           VACCINE 12 YRS+,                                             Branch



           BIVALENT 0.3ML, IM,                                             



           (PFIZER GRAY TOP)                                             

 

           Influenza Virus            2022-10-28 Completed             [facility]

[facility] of



           [facility],            00:00:00                         [facility]



           Preserv and ABX                                             Branch



           Free 6 MO-64 YRS                                             

 

           SARS-COV-2 COVID-19            2022-10-28 Completed             [facility]ive

rsity of



           [facility]            00:00:00                         [facility]

l



           VACCINE 12 YRS+,                                             Branch



           BIVALENT 0.3ML, IM,                                             



           (PFIZER GRAY TOP)                                             

 

           Influenza Virus            2022-10-28 Completed             [facility]

[facility] of



           [facility],            00:00:00                         [facility]



           Preserv and ABX                                             Branch



           Free 6 MO-64 YRS                                             

 

           SARS-COV-2 COVID-19            2022-10-28 Completed             [facility]ive

rsity of



           [facility]            00:00:00                         [facility]

l



           VACCINE 12 YRS+,                                             Branch



           BIVALENT 0.3ML, IM,                                             



           (PFIZER GRAY TOP)                                             

 

           Influenza Virus            2022-10-28 Completed             [facility]

[facility] of



           [facility],            00:00:00                         [facility]



           Preserv and ABX                                             Branch



           Free 6 MO-64 YRS                                             

 

           SARS-COV-2 COVID-19            2022-10-28 Completed             [facility]ive

rsity of



           [facility]            00:00:00                         [facility]

l



           VACCINE 12 YRS+,                                             Branch



           BIVALENT 0.3ML, IM,                                             



           (PFIZER GRAY TOP)                                             

 

           Influenza Virus            2022-10-28 Completed             [facility]

[facility] of



           [facility],            00:00:00                         [facility]



           Preserv and ABX                                             Branch



           Free 6 MO-64 YRS                                             

 

           SARS-COV-2 COVID-19            2022-10-28 Completed             [facility]ive

rsity of



           [facility]            00:00:00                         [facility]

l



           VACCINE 12 YRS+,                                             Branch



           BIVALENT 0.3ML, IM,                                             



           (PFIZER GRAY TOP)                                             

 

           Influenza Virus            2022-10-28 Completed             [facility]

[facility] of



           [facility],            00:00:00                         [facility]



           Preserv and ABX                                             Branch



           Free 6 MO-64 YRS                                             

 

           SARS-COV-2 COVID-19            2022-10-28 Completed             [facility]ive

rsity of



           [facility]            00:00:00                         [facility]

l



           VACCINE 12 YRS+,                                             Branch



           BIVALENT 0.3ML, IM,                                             



           (PFIZER GRAY TOP)                                             

 

           Influenza Virus            2022-10-28 Completed             [facility]

[facility] of



           [facility],            00:00:00                         [facility]



           Preserv and ABX                                             Branch



           Free 6 MO-64 YRS                                             

 

           SARS-COV-2 COVID-19            2022-10-28 Completed             [facility]ive

rsity of



           [facility]            00:00:00                         [facility]

l



           VACCINE 12 YRS+,                                             Branch



           BIVALENT 0.3ML, IM,                                             



           (PFIZER GRAY TOP)                                             

 

           Influenza Virus            2022-10-28 Completed             [facility]

[facility] of



           [facility],            00:00:00                         [facility]



           Preserv and ABX                                             Branch



           Free 6 MO-64 YRS                                             

 

           SARS-COV-2 COVID-19            2022-10-28 Completed             [facility]ive

rsity of



           [facility]            00:00:00                         [facility]

l



           VACCINE 12 YRS+,                                             Branch



           BIVALENT 0.3ML, IM,                                             



           (PFIZER GRAY TOP)                                             

 

           Influenza Virus            2022-10-28 Completed             [facility]

[facility] of



           [facility],            00:00:00                         [facility]



           Preserv and ABX                                             Branch



           Free 6 MO-64 YRS                                             

 

           SARS-COV-2 COVID-19            2022-10-28 Completed             [facility]ive

rsity of



           [facility]            00:00:00                         [facility]

l



           VACCINE 12 YRS+,                                             Branch



           BIVALENT 0.3ML, IM,                                             



           (PFIZER GRAY TOP)                                             

 

           Influenza Virus            2022-10-28 Completed             [facility]

[facility] of



           [facility],            00:00:00                         [facility]



           Preserv and ABX                                             Branch



           Free 6 MO-64 YRS                                             

 

           SARS-COV-2 COVID-19            2022-10-28 Completed             Unive

rsity of



           [facility]            00:00:00                         [facility]

l



           VACCINE 12 YRS+,                                             Branch



           BIVALENT 0.3ML, IM,                                             



           (PFIZER GRAY TOP)                                             

 

           Influenza Virus            2022-10-28 Completed             [facility]

[facility] of



           [facility],            00:00:00                         [facility]



           Preserv and ABX                                             Branch



           Free 6 MO-64 YRS                                             

 

           SARS-COV-2 COVID-19            2022-10-28 Completed             [facility]ive

rsity of



           [facility]            00:00:00                         [facility]

l



           VACCINE 12 YRS+,                                             Branch



           BIVALENT 0.3ML, IM,                                             



           (PFIZER GRAY TOP)                                             

 

           Influenza Virus            2022-10-28 Completed             [facility]

[facility] of



           [facility],            00:00:00                         [facility]



           Preserv and ABX                                             Branch



           Free 6 MO-64 YRS                                             

 

           SARS-COV-2 COVID-19            2022-10-28 Completed             Unive

rsity of



           [facility]            00:00:00                         [facility]

l



           VACCINE 12 YRS+,                                             Branch



           BIVALENT 0.3ML, IM,                                             



           (PFIZER GRAY TOP)                                             

 

           Influenza Virus            2022-10-28 Completed             [facility]

[facility] of



           [facility],            00:00:00                         [facility]



           Preserv and ABX                                             Branch



           Free 6 MO-64 YRS                                             

 

           SARS-COV-2 COVID-19            2022-10-28 Completed             Unive

rsity of



           [facility]            00:00:00                         [facility]

l



           VACCINE 12 YRS+,                                             Branch



           BIVALENT 0.3ML, IM,                                             



           (PFIZER GRAY TOP)                                             

 

           Influenza Virus            2022-10-28 Completed             [facility]

[facility]



           [facility],            00:00:00                         [facility]



           Preserv and ABX                                             Branch



           Free 6 MO-64 YRS                                             

 

           SARS-COV-2 COVID-19            2022-10-28 Completed             Unive

rsity of



           [facility]            00:00:00                         [facility]

l



           VACCINE 12 YRS+,                                             Branch



           BIVALENT 0.3ML, IM,                                             



           (PFIZER GRAY TOP)                                             

 

           Influenza Virus            2022-10-28 Completed             [facility]

[facility]



           [facility],            00:00:00                         [facility]



           Preserv and ABX                                             Branch



           Free 6 MO-64 YRS                                             

 

           SARS-COV-2 COVID-19            2022-10-28 Completed             Unive

rsity of



           [facility]            00:00:00                         [facility]

l



           VACCINE 12 YRS+,                                             Branch



           BIVALENT 0.3ML, IM,                                             



           (PFIZER GRAY TOP)                                             

 

           Influenza Virus            2022-10-28 Completed             [facility]

[facility] of



           [facility],            00:00:00                         [facility]



           Preserv and ABX                                             Branch



           Free 6 MO-64 YRS                                             

 

           SARS-COV-2 COVID-19            2022-10-28 Completed             [facility]ive

rsity of



           [facility]            00:00:00                         [facility]

l



           VACCINE 12 YRS+,                                             Branch



           BIVALENT 0.3ML, IM,                                             



           (PFIZER GRAY TOP)                                             

 

           Influenza Virus            2022-10-28 Completed             [facility]

[facility] of



           [facility],            00:00:00                         [facility]



           Preserv and ABX                                             Branch



           Free 6 MO-64 YRS                                             

 

           SARS-COV-2 COVID-19            2022-10-28 Completed             Unive

rsity of



           [facility]            00:00:00                         [facility]

l



           VACCINE 12 YRS+,                                             Branch



           BIVALENT 0.3ML, IM,                                             



           (PFIZER GRAY TOP)                                             

 

           Influenza Virus            2022-10-28 Completed             [facility]

[facility] of



           [facility]acc[facility] IM,            00:00:00                         [facility]



           Preserv and ABX                                             Branch



           Free 6 MO-64 YRS                                             

 

           SARS-COV-2 COVID-19            2022-10-28 Completed             [facility]ive

rsity of



           [facility]            00:00:00                         [facility]

l



           VACCINE 12 YRS+,                                             Branch



           BIVALENT 0.3ML, IM,                                             



           (PFIZER GRAY TOP)                                             

 

           Influenza Virus            2022-10-28 Completed             [facility]

[facility] of



           [facility]acc[facility] IM,            00:00:00                         [facility]



           Preserv and ABX                                             Branch



           Free 6 MO-64 YRS                                             

 

           SARS-COV-2 COVID-19            2022-10-28 Completed             [facility]

rsity of



           [facility]            00:00:00                         [facility]

l



           VACCINE 12 YRS+,                                             Branch



           BIVALENT 0.3ML, IM,                                             



           (PFIZER GRAY TOP)                                             

 

           Influenza Virus            2022-10-28 Completed             [facility]

[facility] of



           Vacc[facility] IM,            00:00:00                         [facility]



           Preserv and ABX                                             Branch



           Free 6 MO-64 YRS                                             

 

           SARS-COV-2 COVID-19            2022-10-28 Completed             [facility]

rsity of



           [facility]            00:00:00                         [facility]

l



           VACCINE 12 YRS+,                                             Branch



           BIVALENT 0.3ML, IM,                                             



           (PFIZER GRAY TOP)                                             

 

           Influenza Virus            2022-10-28 Completed             [facility]

[facility]



           Vacc[facility] IM,            00:00:00                         [facility]



           Preserv and ABX                                             Branch



           Free 6 MO-64 YRS                                             

 

           SARS-COV-2 COVID-19            2022-10-28 Completed             [facility]

rsity of



           [facility]            00:00:00                         [facility]

l



           VACCINE 12 YRS+,                                             Branch



           BIVALENT 0.3ML, IM,                                             



           (PFIZER GRAY TOP)                                             

 

           SARS-COV-2 COVID-19            2021-10-25 Completed             [facility]ive

rsity of



           PFIZER VACCINE            00:00:00                         [facility]

[facility]



                                                                  Branch

 

           SARS-COV-2 COVID-19            2021-10-25 Completed             [facility]ive

rsity of



           PFIZER VACCINE            00:00:00                         [facility]

[facility]



                                                                  Branch

 

           SARS-COV-2 COVID-19            2021-10-25 Completed             [facility]ive

rsity of



           PFIZER VACCINE            00:00:00                         [facility]

[facility]



                                                                  Branch

 

           SARS-COV-2 COVID-19            2021-10-25 Completed             [facility]ive

rsity of



           PFIZER VACCINE            00:00:00                         [facility]

[facility]



                                                                  Branch

 

           SARS-COV-2 COVID-19            2021-10-25 Completed             [facility]ive

rsity of



           PFIZER VACCINE            00:00:00                         [facility]

[facility]



                                                                  Branch

 

           SARS-COV-2 COVID-19            2021-10-25 Completed             [facility]ive

rsity of



           PFIZER VACCINE            00:00:00                         [facility]

[facility]



                                                                  Branch

 

           SARS-COV-2 COVID-19            2021-10-25 Completed             [facility]

rsity of



           PFIZER VACCINE            00:00:00                         [facility]

[facility]



                                                                  Branch

 

           SARS-COV-2 COVID-19            2021-10-25 Completed             [facility]ive

rsity of



           PFIZER VACCINE            00:00:00                         [facility]

[facility]



                                                                  Branch

 

           SARS-COV-2 COVID-19            2021-10-25 Completed             [facility]ive

rsity of



           PFIZER VACCINE            00:00:00                         [facility]

[facility]



                                                                  Branch

 

           SARS-COV-2 COVID-19            2021-10-25 Completed             [facility]ive

rsity of



           PFIZER VACCINE            00:00:00                         [facility]

[facility]



                                                                  Branch

 

           SARS-COV-2 COVID-19            2021-10-25 Completed             [facility]

rsity of



           PFIZER VACCINE            00:00:00                         [facility]

[facility]



                                                                  Branch

 

           SARS-COV-2 COVID-19            2021-10-25 Completed             [facility]

rsity of



           PFIZER VACCINE            00:00:00                         [facility]

[facility]



                                                                  Branch

 

           SARS-COV-2 COVID-19            2021-10-25 Completed             [facility]ive

rsity of



           PFIZER VACCINE            00:00:00                         [facility]

[facility]



                                                                  Branch

 

           SARS-COV-2 COVID-19            2021-10-25 Completed             [facility]ive

rsity of



           PFIZER VACCINE            00:00:00                         [facility]

[facility]



                                                                  Branch

 

           SARS-COV-2 COVID-19            2021-10-25 Completed             [facility]ive

rsity of



           PFIZER VACCINE            00:00:00                         [facility]

[facility]



                                                                  Branch

 

           SARS-COV-2 COVID-19            2021-10-25 Completed             [facility]ive

rsity of



           PFIZER VACCINE            00:00:00                         [facility]

[facility]



                                                                  Branch

 

           SARS-COV-2 COVID-19            2021-10-25 Completed             [facility]ive

rsity of



           PFIZER VACCINE            00:00:00                         [facility]

[facility]



                                                                  Branch

 

           SARS-COV-2 COVID-19            2021-10-25 Completed             [facility]ive

rsity of



           PFIZER VACCINE            00:00:00                         [facility]

[facility]



                                                                  Branch

 

           SARS-COV-2 COVID-19            2021-10-25 Completed             [facility]ive

rsity of



           PFIZER VACCINE            00:00:00                         [facility]

[facility]



                                                                  Branch

 

           SARS-COV-2 COVID-19            2021-10-25 Completed             [facility]ive

rsity of



           PFIZER VACCINE            00:00:00                         [facility]

[facility]



                                                                  Branch

 

           SARS-COV-2 COVID-19            2021-10-25 Completed             [facility]ive

rsity of



           PFIZER VACCINE            00:00:00                         [facility]

[facility]



                                                                  Branch

 

           SARS-COV-2 COVID-19            2021-10-25 Completed             Unive

rsity of



           PFIZER VACCINE            00:00:00                         [facility]

[facility]



                                                                  Branch

 

           SARS-COV-2 COVID-19            2021-10-25 Completed             Unive

rsity of



           PFIZER VACCINE            00:00:00                         [facility]

[facility]



                                                                  Branch

 

           SARS-COV-2 COVID-19            2021-10-25 Completed             Unive

rsity of



           PFIZER VACCINE            00:00:00                         [facility]

[facility]



                                                                  Branch

 

           SARS-COV-2 COVID-19            2021-10-25 Completed             [facility]ive

rsity of



           PFIZER VACCINE            00:00:00                         [facility]

[facility]



                                                                  Branch

 

           SARS-COV-2 COVID-19            2021-10-25 Completed             Unive

rsity of



           PFIZER VACCINE            00:00:00                         [facility]

[facility]



                                                                  Branch

 

           SARS-COV-2 COVID-19            2021-10-25 Completed             Unive

rsity of



           PFIZER VACCINE            00:00:00                         [facility]

[facility]



                                                                  Branch

 

           SARS-COV-2 COVID-19            2021-10-25 Completed             Unive

rsity of



           PFIZER VACCINE            00:00:00                         [facility]

[facility]



                                                                  Branch

 

           SARS-COV-2 COVID-19            2021-10-25 Completed             Unive

rsity of



           PFIZER VACCINE            00:00:00                         [facility]

[facility]



                                                                  Branch

 

           SARS-COV-2 COVID-19            2021-10-25 Completed             Unive

rsity of



           PFIZER VACCINE            00:00:00                         [facility]

[facility]



                                                                  Branch

 

           SARS-COV-2 COVID-19            2021-10-25 Completed             Unive

rsity of



           PFIZER VACCINE            00:00:00                         [facility]

[facility]



                                                                  Branch

 

           SARS-COV-2 COVID-19            2021-10-25 Completed             Unive

rsity of



           PFIZER VACCINE            00:00:00                         [facility]

[facility]



                                                                  Branch

 

           SARS-COV-2 COVID-19            2021-10-25 Completed             Unive

rsity of



           PFIZER VACCINE            00:00:00                         [facility]

[facility]



                                                                  Branch

 

           SARS-COV-2 COVID-19            2021-10-25 Completed             Unive

rsity of



           PFIZER VACCINE            00:00:00                         [facility]

[facility]



                                                                  Branch

 

           SARS-COV-2 COVID-19            2021-10-25 Completed             Unive

rsity of



           PFIZER VACCINE            00:00:00                         [facility]

[facility]



                                                                  Branch

 

           SARS-COV-2 COVID-19            2021-10-25 Completed             Unive

rsity of



           PFIZER VACCINE            00:00:00                         [facility]

[facility]



                                                                  Branch

 

           SARS-COV-2 COVID-19            2021-10-25 Completed             Unive

rsity of



           PFIZER VACCINE            00:00:00                         [facility]

[facility]



                                                                  Branch

 

           SARS-COV-2 COVID-19            2021-10-25 Completed             Unive

rsity of



           PFIZER VACCINE            00:00:00                         [facility]

[facility]



                                                                  Branch

 

           SARS-COV-2 COVID-19            2021-10-25 Completed             Unive

rsity of



           PFIZER VACCINE            00:00:00                         [facility]

[facility]



                                                                  Branch

 

           SARS-COV-2 COVID-19            2021-10-25 Completed             Unive

rsity of



           PFIZER VACCINE            00:00:00                         [facility]

[facility]



                                                                  Branch

 

           SARS-COV-2 COVID-19            2021-10-25 Completed             Unive

rsity of



           PFIZER VACCINE            00:00:00                         [facility]

[facility]



                                                                  Branch

 

           SARS-COV-2 COVID-19            2021-10-25 Completed             Unive

rsity of



           PFIZER VACCINE            00:00:00                         [facility]

[facility]



                                                                  Branch

 

           SARS-COV-2 COVID-19            2021-10-25 Completed             Unive

rsity of



           PFIZER VACCINE            00:00:00                         [facility]

[facility]



                                                                  Branch

 

           SARS-COV-2 COVID-19            2021-10-25 Completed             Unive

rsity of



           PFIZER VACCINE            00:00:00                         [facility]

[facility]



                                                                  Branch

 

           SARS-COV-2 COVID-19            2021-10-25 Completed             Unive

rsity of



           PFIZER VACCINE            00:00:00                         [facility]

[facility]



                                                                  Branch

 

           SARS-COV-2 COVID-19            2021-10-25 Completed             Unive

rsity of



           PFIZER VACCINE            00:00:00                         [facility]

[facility]



                                                                  Branch

 

           SARS-COV-2 COVID-19            2021-10-25 Completed             Unive

rsity of



           PFIZER VACCINE            00:00:00                         [facility]

[facility]



                                                                  Branch

 

           SARS-COV-2 COVID-19            2021-10-25 Completed             Unive

rsity of



           PFIZER VACCINE            00:00:00                         [facility]

[facility]



                                                                  Branch

 

           SARS-COV-2 COVID-19            2021-10-25 Completed             Unive

rsity of



           PFIZER VACCINE            00:00:00                         [facility]

[facility]



                                                                  Branch

 

           SARS-COV-2 COVID-19            2021-10-25 Completed             Unive

rsity of



           PFIZER VACCINE            00:00:00                         [facility]

[facility]



                                                                  Branch

 

           SARS-COV-2 COVID-19            2021-10-25 Completed             Unive

rsity of



           PFIZER VACCINE            00:00:00                         [facility]

[facility]



                                                                  Branch

 

           SARS-COV-2 COVID-19            2021-10-25 Completed             Unive

rsity of



           PFIZER VACCINE            00:00:00                         [facility]

[facility]



                                                                  Branch

 

           SARS-COV-2 COVID-19            2021-10-25 Completed             Unive

rsity of



           PFIZER VACCINE            00:00:00                         [facility]

 

           SARS-COV-2 COVID-19            2021-10-25 Completed             Unive

rsity of



           PFIZER VACCINE            00:00:00                         [facility]

[facility]



                                                                  Branch

 

           SARS-COV-2 COVID-19            2021-10-25 Completed             Unive

rsity of



           PFIZER VACCINE            00:00:00                         [facility]

 

           SARS-COV-2 COVID-19            2021-10-25 Completed             Unive

rsity of



           PFIZER VACCINE            00:00:00                         [facility]

 

           SARS-COV-2 COVID-19            2021-10-25 Completed             Unive

rsity of



           PFIZER VACCINE            00:00:00                         [facility]

 

           SARS-COV-2 COVID-19            2021-10-25 Completed             Unive

rsity of



           PFIZER VACCINE            00:00:00                         [facility]

 

           SARS-COV-2 COVID-19            2021-10-25 Completed             Unive

rsity of



           PFIZER VACCINE            00:00:00                         [facility]

 

           SARS-COV-2 COVID-19            2021-10-25 Completed             Unive

rsity of



           PFIZER VACCINE            00:00:00                         [facility]

 

           SARS-COV-2 COVID-19            2021-10-25 Completed             Unive

rsity of



           PFIZER VACCINE            00:00:00                         [facility]

 

           Influenza Virus            2021-10-01 Completed             Universit

y of



           Vaccine (6-35 mo)            00:00:00                         [facility]

 

           Influenza Virus            2021-10-01 Completed             Universit

y of



           Vaccine (6-35 mo)            00:00:00                         [facility]

 

           Influenza Virus            2021-10-01 Completed             Universit

y of



           Vaccine (6-35 mo)            00:00:00                         [facility]

 

           Influenza Virus            2021-10-01 Completed             Universit

[facility] of



           Vaccine (6-35 mo)            00:00:00                         [facility]

 

           Influenza Virus            2021-10-01 Completed             Universit

y of



           Vaccine (6-35 mo)            00:00:00                         [facility]

 

           Influenza Virus            2021-10-01 Completed             Universit

y of



           Vaccine (6-35 mo)            00:00:00                         [facility]

 

           Influenza Virus            2021-10-01 Completed             Universit

y of



           Vaccine (6-35 mo)            00:00:00                         [facility]

 

           Influenza Virus            2021-10-01 Completed             Universit

y of



           Vaccine (6-35 mo)            00:00:00                         [facility]

 

           Influenza Virus            2021-10-01 Completed             Universit

y of



           Vaccine (6-35 mo)            00:00:00                         [facility]

 

           Influenza Virus            2021-10-01 Completed             Universit

y of



           Vaccine (6-35 mo)            00:00:00                         [facility]

 

           Influenza Virus            2021-10-01 Completed             Universit

y of



           Vaccine (6-35 mo)            00:00:00                         [facility]

 

           Influenza Virus            2021-10-01 Completed             Universit

y of



           Vaccine (6-35 mo)            00:00:00                         [facility]

 

           Influenza Virus            2021-10-01 Completed             [facility]

[facility]



           Vaccine (6-35 mo)            00:00:00                         [facility]

 

           Influenza Virus            2021-10-01 Completed             [facility]

[facility] of



           Vaccine (6-35 mo)            00:00:00                         [facility]

 

           Influenza Virus            2021-10-01 Completed             [facility]

[facility] of



           Vaccine (6-35 mo)            00:00:00                         [facility]

 

           Influenza Virus            2021-10-01 Completed             [facility]

[facility] of



           Vaccine (6-35 mo)            00:00:00                         [facility]

 

           Influenza Virus            2021-10-01 Completed             [facility]

[facility] of



           Vaccine (6-35 mo)            00:00:00                         [facility]

 

           Influenza Virus            2021-10-01 Completed             [facility]

[facility] of



           Vaccine (6-35 mo)            00:00:00                         [facility]

 

           Influenza Virus            2021-10-01 Completed             [facility]

[facility] of



           Vaccine (6-35 mo)            00:00:00                         [facility]

 

           Influenza Virus            2021-10-01 Completed             [facility]

[facility] of



           Vaccine (6-35 mo)            00:00:00                         [facility]

 

           Influenza Virus            2021-10-01 Completed             [facility]

[facility] of



           Vaccine (6-35 mo)            00:00:00                         [facility]

 

           Influenza Virus            2021-10-01 Completed             [facility]

[facility] of



           Vaccine (6-35 mo)            00:00:00                         [facility]

 

           Influenza Virus            2021-10-01 Completed             [facility]

[facility] of



           Vaccine (6-35 mo)            00:00:00                         [facility]

 

           Influenza Virus            2021-10-01 Completed             [facility]

[facility] of



           Vaccine (6-35 mo)            00:00:00                         [facility]

 

           Influenza Virus            2021-10-01 Completed             [facility]

[facility] of



           Vaccine (6-35 mo)            00:00:00                         [facility]

 

           Influenza Virus            2021-10-01 Completed             [facility]

[facility] of



           Vaccine (6-35 mo)            00:00:00                         [facility]

 

           Influenza Virus            2021-10-01 Completed             [facility]

[facility] of



           Vaccine (6-35 mo)            00:00:00                         [facility]

 

           Influenza Virus            2021-10-01 Completed             [facility]

[facility] of



           Vaccine (6-35 mo)            00:00:00                         [facility]

 

           Influenza Virus            2021-10-01 Completed             [facility]

[facility] of



           Vaccine (6-35 mo)            00:00:00                         [facility]

 

           Influenza Virus            2021-10-01 Completed             [facility]

[facility] of



           Vaccine (6-35 mo)            00:00:00                         [facility]

 

           Influenza Virus            2021-10-01 Completed             [facility]

[facility] of



           Vaccine (6-35 mo)            00:00:00                         [facility]

 

           Influenza Virus            2021-10-01 Completed             [facility]

[facility] of



           Vaccine (6-35 mo)            00:00:00                         [facility]

 

           Influenza Virus            2021-10-01 Completed             [facility]

[facility] of



           Vaccine (6-35 mo)            00:00:00                         [facility]

 

           Influenza Virus            2021-10-01 Completed             [facility]

[facility] of



           Vaccine (6-35 mo)            00:00:00                         [facility]

 

           Influenza Virus            2021-10-01 Completed             [facility]

[facility] of



           Vaccine (6-35 mo)            00:00:00                         [facility]

 

           Influenza Virus            2021-10-01 Completed             [facility]

[facility] of



           Vaccine (6-35 mo)            00:00:00                         [facility]

 

           Influenza Virus            2021-10-01 Completed             [facility]

[facility] of



           Vaccine (6-35 mo)            00:00:00                         [facility]

 

           Influenza Virus            2021-10-01 Completed             [facility]

[facility] of



           Vaccine (6-35 mo)            00:00:00                         [facility]

 

           Influenza Virus            2021-10-01 Completed             [facility]

[facility] of



           Vaccine (6-35 mo)            00:00:00                         [facility]

 

           Influenza Virus            2021-10-01 Completed             [facility]

[facility] of



           Vaccine (6-35 mo)            00:00:00                         [facility]

 

           Influenza Virus            2021-10-01 Completed             [facility]

[facility] of



           Vaccine (6-35 mo)            00:00:00                         [facility]

 

           Influenza Virus            2021-10-01 Completed             [facility]

[facility] of



           Vaccine (6-35 mo)            00:00:00                         [facility]

 

           Influenza Virus            2021-10-01 Completed             [facility]

[facility] of



           Vaccine (6-35 mo)            00:00:00                         [facility]

 

           Influenza Virus            2021-10-01 Completed             [facility]

[facility] of



           Vaccine (6-35 mo)            00:00:00                         [facility]

 

           Influenza Virus            2021-10-01 Completed             [facility]

[facility] of



           Vaccine (6-35 mo)            00:00:00                         [facility]

 

           Influenza Virus            2021-10-01 Completed             [facility]

[facility] of



           Vaccine (6-35 mo)            00:00:00                         [facility]

 

           Influenza Virus            2021-10-01 Completed             [facility]

[facility] of



           Vaccine (6-35 mo)            00:00:00                         [facility]

 

           Influenza Virus            2021-10-01 Completed             [facility]

[facility] of



           Vaccine (6-35 mo)            00:00:00                         [facility]

 

           Influenza Virus            2021-10-01 Completed             [facility]

[facility] of



           Vaccine (6-35 mo)            00:00:00                         [facility]

 

           Influenza Virus            2021-10-01 Completed             [facility]

[facility] of



           Vaccine (6-35 mo)            00:00:00                         [facility]

 

           Influenza Virus            2021-10-01 Completed             [facility]

[facility] of



           Vaccine (6-35 mo)            00:00:00                         [facility]

 

           Influenza Virus            2021-10-01 Completed             [facility]

[facility] of



           Vaccine (6-35 mo)            00:00:00                         [facility]

 

           Influenza Virus            2021-10-01 Completed             [facility]

[facility] of



           Vaccine (6-35 mo)            00:00:00                         [facility]

 

           Influenza Virus            2021-10-01 Completed             [facility]

[facility] of



           Vaccine (6-35 mo)            00:00:00                         [facility]

 

           Influenza Virus            2021-10-01 Completed             [facility]

[facility] of



           Vaccine (6-35 mo)            00:00:00                         [facility]

 

           Influenza Virus            2021-10-01 Completed             [facility]

[facility] of



           Vaccine (6-35 mo)            00:00:00                         [facility]

 

           Influenza Virus            2021-10-01 Completed             [facility]

[facility] of



           Vaccine (6-35 mo)            00:00:00                         [facility]

 

           Influenza Virus            2021-10-01 Completed             [facility]

[facility] of



           Vaccine (6-35 mo)            00:00:00                         [facility]

 

           Influenza Virus            2021-10-01 Completed             [facility]

[facility] of



           Vaccine (6-35 mo)            00:00:00                         [facility]

 

           Influenza Virus            2021-10-01 Completed             [facility]

[facility] of



           Vaccine (6-35 mo)            00:00:00                         [facility]

 

           Influenza Virus            2021-10-01 Completed             [facility]

[facility] of



           Vaccine (6-35 mo)            00:00:00                         [facility]

 

           Influenza Virus            2021-10-01 Completed             [facility]

[facility] of



           Vaccine (6-35 mo)            00:00:00                         [facility]

 

           SARS-COV-2 COVID-19            2021 Completed             [facility]

[facility] of



           PFIZER VACCINE            00:00:00                         [facility]

 

           SARS-COV-2 COVID-19            2021 Completed             [facility]

[facility] of



           PFIZER VACCINE            00:00:00                         [facility]

 

           SARS-COV-2 COVID-19            2021 Completed             [facility]

[facility] of



           PFIZER VACCINE            00:00:00                         [facility]

 

           SARS-COV-2 COVID-19            2021 Completed             [facility]

[facility] of



           PFIZER VACCINE            00:00:00                         [facility]

 

           SARS-COV-2 COVID-19            2021 Completed             [facility]

[facility] of



           PFIZER VACCINE            00:00:00                         [facility]

 

           SARS-COV-2 COVID-19            2021 Completed             [facility]

[facility] of



           PFIZER VACCINE            00:00:00                         [facility]

 

           SARS-COV-2 COVID-19            2021 Completed             [facility]

[facility] of



           PFIZER VACCINE            00:00:00                         [facility]

 

           SARS-COV-2 COVID-19            2021 Completed             [facility]

[facility] of



           PFIZER VACCINE            00:00:00                         [facility]

 

           SARS-COV-2 COVID-19            2021 Completed             [facility]

[facility] of



           PFIZER VACCINE            00:00:00                         [facility]

 

           SARS-COV-2 COVID-19            2021 Completed             [facility]

[facility] of



           PFIZER VACCINE            00:00:00                         [facility]

 

           SARS-COV-2 COVID-19            2021 Completed             [facility]

[facility] of



           PFIZER VACCINE            00:00:00                         [facility]

 

           SARS-COV-2 COVID-19            2021 Completed             [facility]

[facility] of



           PFIZER VACCINE            00:00:00                         [facility]

 

           SARS-COV-2 COVID-19            2021 Completed             [facility]

[facility] of



           PFIZER VACCINE            00:00:00                         [facility]

 

           SARS-COV-2 COVID-19            2021 Completed             [facility]

[facility] of



           PFIZER VACCINE            00:00:00                         [facility]

[facility]



                                                                  [facility]

 

           SARS-COV-2 COVID-19            2021 Completed             [facility]

[facility] of



           PFIZER VACCINE            00:00:00                         [facility]

[facility]



                                                                  [facility]

 

           SARS-COV-2 COVID-19            2021 Completed             [facility]

[facility] of



           PFIZER VACCINE            00:00:00                         [facility]

[facility]



                                                                  [facility]

 

           SARS-COV-2 COVID-19            2021 Completed             [facility]

[facility] of



           PFIZER VACCINE            00:00:00                         [facility]

[facility]



                                                                  [facility]

 

           SARS-COV-2 COVID-19            2021 Completed             [facility]

[facility] of



           PFIZER VACCINE            00:00:00                         [facility]

[facility]



                                                                  [facility]

 

           SARS-COV-2 COVID-19            2021 Completed             [facility]

[facility] of



           PFIZER VACCINE            00:00:00                         [facility]

[facility]



                                                                  [facility]

 

           SARS-COV-2 COVID-19            2021 Completed             [facility]

[facility] of



           PFIZER VACCINE            00:00:00                         [facility]

[facility]



                                                                  [facility]

 

           SARS-COV-2 COVID-19            2021 Completed             [facility]

[facility] of



           PFIZER VACCINE            00:00:00                         [facility]

[facility]



                                                                  [facility]

 

           SARS-COV-2 COVID-19            2021 Completed             [facility]

[facility] of



           PFIZER VACCINE            00:00:00                         [facility]

[facility]



                                                                  [facility]

 

           SARS-COV-2 COVID-19            2021 Completed             [facility]

[facility] of



           PFIZER VACCINE            00:00:00                         [facility]

[facility]



                                                                  [facility]

 

           SARS-COV-2 COVID-19            2021 Completed             [facility]

[facility] of



           PFIZER VACCINE            00:00:00                         [facility]

[facility]



                                                                  [facility]

 

           SARS-COV-2 COVID-19            2021 Completed             [facility]

[facility] of



           PFIZER VACCINE            00:00:00                         [facility]

[facility]



                                                                  [facility]

 

           SARS-COV-2 COVID-19            2021 Completed             [facility]

[facility] of



           PFIZER VACCINE            00:00:00                         [facility]

[facility]



                                                                  [facility]

 

           SARS-COV-2 COVID-19            2021 Completed             [facility]

[facility] of



           PFIZER VACCINE            00:00:00                         [facility]

[facility]



                                                                  [facility]

 

           SARS-COV-2 COVID-19            2021 Completed             [facility]

[facility] of



           PFIZER VACCINE            00:00:00                         [facility]

[facility]



                                                                  [facility]

 

           SARS-COV-2 COVID-19            2021 Completed             [facility]

[facility] of



           PFIZER VACCINE            00:00:00                         [facility]

[facility]



                                                                  [facility]

 

           SARS-COV-2 COVID-19            2021 Completed             [facility]

[facility] of



           PFIZER VACCINE            00:00:00                         [facility]

[facility]



                                                                  [facility]

 

           SARS-COV-2 COVID-19            2021 Completed             [facility]

[facility] of



           PFIZER VACCINE            00:00:00                         [facility]

[facility]



                                                                  [facility]

 

           SARS-COV-2 COVID-19            2021 Completed             [facility]

[facility] of



           PFIZER VACCINE            00:00:00                         [facility]

[facility]



                                                                  [facility]

 

           SARS-COV-2 COVID-19            2021 Completed             [facility]

[facility] of



           PFIZER VACCINE            00:00:00                         [facility]

[facility]



                                                                  [facility]

 

           SARS-COV-2 COVID-19            2021 Completed             [facility]

[facility] of



           PFIZER VACCINE            00:00:00                         [facility]

[facility]



                                                                  [facility]

 

           SARS-COV-2 COVID-19            2021 Completed             [facility]

[facility] of



           PFIZER VACCINE            00:00:00                         [facility]

[facility]



                                                                  [facility]

 

           SARS-COV-2 COVID-19            2021 Completed             [facility]

[facility] of



           PFIZER VACCINE            00:00:00                         [facility]

[facility]



                                                                  [facility]

 

           SARS-COV-2 COVID-19            2021 Completed             [facility]

[facility] of



           PFIZER VACCINE            00:00:00                         [facility]

[facility]



                                                                  [facility]

 

           SARS-COV-2 COVID-19            2021 Completed             [facility]

[facility] of



           PFIZER VACCINE            00:00:00                         [facility]

[facility]



                                                                  [facility]

 

           SARS-COV-2 COVID-19            2021 Completed             [facility]

[facility] of



           PFIZER VACCINE            00:00:00                         [facility]

[facility]



                                                                  [facility]

 

           SARS-COV-2 COVID-19            2021 Completed             [facility]

[facility] of



           PFIZER VACCINE            00:00:00                         [facility]

[facility]



                                                                  [facility]

 

           SARS-COV-2 COVID-19            2021 Completed             [facility]

[facility] of



           PFIZER VACCINE            00:00:00                         [facility]

[facility]



                                                                  [facility]

 

           SARS-COV-2 COVID-19            2021 Completed             [facility]

[facility] of



           PFIZER VACCINE            00:00:00                         [facility]

[facility]



                                                                  [facility]

 

           SARS-COV-2 COVID-19            2021 Completed             [facility]

[facility] of



           PFIZER VACCINE            00:00:00                         [facility]

[facility]



                                                                  [facility]

 

           SARS-COV-2 COVID-19            2021 Completed             [facility]

[facility] of



           PFIZER VACCINE            00:00:00                         [facility]

[facility]



                                                                  [facility]

 

           SARS-COV-2 COVID-19            2021 Completed             [facility]

[facility] of



           PFIZER VACCINE            00:00:00                         [facility]

[facility]



                                                                  [facility]

 

           SARS-COV-2 COVID-19            2021 Completed             [facility]

[facility] of



           PFIZER VACCINE            00:00:00                         [facility]

[facility]



                                                                  [facility]

 

           SARS-COV-2 COVID-19            2021 Completed             [facility]

[facility] of



           PFIZER VACCINE            00:00:00                         [facility]

[facility]



                                                                  [facility]

 

           SARS-COV-2 COVID-19            2021 Completed             [facility]

[facility] of



           PFIZER VACCINE            00:00:00                         [facility]

[facility]



                                                                  [facility]

 

           SARS-COV-2 COVID-19            2021 Completed             [facility]

[facility] of



           PFIZER VACCINE            00:00:00                         [facility]

[facility]



                                                                  [facility]

 

           SARS-COV-2 COVID-19            2021 Completed             [facility]

[facility] of



           PFIZER VACCINE            00:00:00                         [facility]

[facility]



                                                                  [facility]

 

           SARS-COV-2 COVID-19            2021 Completed             [facility]

[facility] of



           PFIZER VACCINE            00:00:00                         [facility]

[facility]



                                                                  [facility]

 

           SARS-COV-2 COVID-19            2021 Completed             [facility]

[facility] of



           PFIZER VACCINE            00:00:00                         [facility]

[facility]



                                                                  [facility]

 

           SARS-COV-2 COVID-19            2021 Completed             [facility]

[facility] of



           PFIZER VACCINE            00:00:00                         [facility]

[facility]



                                                                  [facility]

 

           SARS-COV-2 COVID-19            2021 Completed             [facility]

[facility] of



           PFIZER VACCINE            00:00:00                         [facility]

[facility]



                                                                  [facility]

 

           SARS-COV-2 COVID-19            2021 Completed             [facility]

[facility] of



           PFIZER VACCINE            00:00:00                         [facility]

[facility]



                                                                  [facility]

 

           SARS-COV-2 COVID-19            2021 Completed             [facility]

[facility] of



           PFIZER VACCINE            00:00:00                         [facility]

[facility]



                                                                  [facility]

 

           SARS-COV-2 COVID-19            2021 Completed             [facility]

[facility] of



           PFIZER VACCINE            00:00:00                         [facility]

[facility]



                                                                  [facility]

 

           SARS-COV-2 COVID-19            2021 Completed             [facility]

[facility] of



           PFIZER VACCINE            00:00:00                         [facility]

[facility]



                                                                  [facility]

 

           SARS-COV-2 COVID-19            2021 Completed             [facility]

[facility] of



           PFIZER VACCINE            00:00:00                         [facility]

[facility]



                                                                  [facility]

 

           SARS-COV-2 COVID-19            2021 Completed             [facility]

rs[facility] of



           PFIZER VACCINE            00:00:00                         [facility]

[facility]



                                                                  [facility]

 

           SARS-COV-2 COVID-19            2021 Completed             [facility]

[facility]



           PFIZER VACCINE            00:00:00                         [facility]

[facility]



                                                                  [facility]

 

           SARS-COV-2 COVID-19            2021 Completed             [facility]

[facility]



           PFIZER VACCINE            00:00:00                         [facility]

[facility]



                                                                  [facility]

 

           SARS-COV-2 COVID-19            2021 Completed             [facility]

[facility]



           PFIZER VACCINE            00:00:00                         [facility]

[facility]



                                                                  [facility]

 

           SARS-COV-2 COVID-19            2021 Completed             [facility]

[facility]



           PFIZER VACCINE            00:00:00                         [facility]

[facility]



                                                                  [facility]

 

           SARS-COV-2 COVID-19            2021 Completed             [facility]

[facility]



           PFIZER VACCINE            00:00:00                         [facility]

[facility]



                                                                  [facility]

 

           SARS-COV-2 COVID-19            2021 Completed             [facility]

[facility]



           PFIZER VACCINE            00:00:00                         [facility]

[facility]



                                                                  [facility]

 

           SARS-COV-2 COVID-19            2021 Completed             [facility]

[facility]



           PFIZER VACCINE            00:00:00                         [facility]

[facility]



                                                                  [facility]

 

           SARS-COV-2 COVID-19            2021 Completed             [facility]

[facility]



           PFIZER VACCINE            00:00:00                         [facility]

[facility]



                                                                  [facility]

 

           SARS-COV-2 COVID-19            2021 Completed             [facility]

[facility]



           PFIZER VACCINE            00:00:00                         [facility]

[facility]



                                                                  [facility]

 

           SARS-COV-2 COVID-19            2021 Completed             [facility]

[facility] of



           PFIZER VACCINE            00:00:00                         [facility]

 

           SARS-COV-2 COVID-19            2021 Completed             [facility]

rs[facility] of



           PFIZER VACCINE            00:00:00                         [facility]

 

           SARS-COV-2 COVID-19            2021 Completed             [facility]

rs[facility] of



           PFIZER VACCINE            00:00:00                         [facility]

[facility]



                                                                  [facility]

 

           SARS-COV-2 COVID-19            2021 Completed             [facility]

[facility] of



           PFIZER VACCINE            00:00:00                         [facility]

[facility]



                                                                  [facility]

 

           SARS-COV-2 COVID-19            2021 Completed             [facility]

[facility] of



           PFIZER VACCINE            00:00:00                         [facility]

 

           SARS-COV-2 COVID-19            2021 Completed             [facility]

[facility] of



           PFIZER VACCINE            00:00:00                         [facility]

 

           SARS-COV-2 COVID-19            2021 Completed             [facility]

[facility] of



           PFIZER VACCINE            00:00:00                         [facility]

 

           SARS-COV-2 COVID-19            2021 Completed             [facility]

[facility] of



           PFIZER VACCINE            00:00:00                         [facility]

[facility]



                                                                  [facility]

 

           SARS-COV-2 COVID-19            2021 Completed             [facility]

[facility] of



           PFIZER VACCINE            00:00:00                         [facility]

[facility]



                                                                  [facility]

 

           SARS-COV-2 COVID-19            2021 Completed             [facility]

[facility] of



           PFIZER VACCINE            00:00:00                         [facility]

[facility]



                                                                  [facility]

 

           SARS-COV-2 COVID-19            2021 Completed             [facility]

[facility] of



           PFIZER VACCINE            00:00:00                         [facility]

[facility]



                                                                  [facility]

 

           SARS-COV-2 COVID-19            2021 Completed             [facility]

[facility] of



           PFIZER VACCINE            00:00:00                         [facility]

[facility]



                                                                  [facility]

 

           SARS-COV-2 COVID-19            2021 Completed             [facility]

[facility]



           PFIZER VACCINE            00:00:00                         [facility]

[facility]



                                                                  [facility]

 

           SARS-COV-2 COVID-19            2021 Completed             [facility]

[facility] of



           PFIZER VACCINE            00:00:00                         [facility]

[facility]



                                                                  [facility]

 

           SARS-COV-2 COVID-19            2021 Completed             [facility]

[facility]



           PFIZER VACCINE            00:00:00                         [facility]

[facility]



                                                                  [facility]

 

           SARS-COV-2 COVID-19            2021 Completed             [facility]

[facility] of



           PFIZER VACCINE            00:00:00                         [facility]

[facility]



                                                                  [facility]

 

           SARS-COV-2 COVID-19            2021 Completed             [facility]

[facility] of



           PFIZER VACCINE            00:00:00                         [facility]

[facility]



                                                                  [facility]

 

           SARS-COV-2 COVID-19            2021 Completed             [facility]

[facility] of



           PFIZER VACCINE            00:00:00                         [facility]

[facility]



                                                                  [facility]

 

           SARS-COV-2 COVID-19            2021 Completed             [facility]

[facility]



           PFIZER VACCINE            00:00:00                         [facility]

[facility]



                                                                  [facility]

 

           SARS-COV-2 COVID-19            2021 Completed             [facility]

[facility]



           PFIZER VACCINE            00:00:00                         [facility]

[facility]



                                                                  [facility]

 

           SARS-COV-2 COVID-19            2021 Completed             [facility]

[facility]



           PFIZER VACCINE            00:00:00                         [facility]

 

           SARS-COV-2 COVID-19            2021 Completed             [facility]

[facility] of



           PFIZER VACCINE            00:00:00                         [facility]

[facility]



                                                                  [facility]

 

           SARS-COV-2 COVID-19            2021 Completed             [facility]

[facility] of



           PFIZER VACCINE            00:00:00                         [facility]

[facility]



                                                                  [facility]

 

           SARS-COV-2 COVID-19            2021 Completed             [facility]

[facility] of



           PFIZER VACCINE            00:00:00                         [facility]

[facility]



                                                                  [facility]

 

           SARS-COV-2 COVID-19            2021 Completed             [facility]

[facility] of



           PFIZER VACCINE            00:00:00                         [facility]

[facility]



                                                                  [facility]

 

           SARS-COV-2 COVID-19            2021 Completed             [facility]

[facility] of



           PFIZER VACCINE            00:00:00                         [facility]

[facility]



                                                                  [facility]

 

           SARS-COV-2 COVID-19            2021 Completed             [facility]

[facility] of



           PFIZER VACCINE            00:00:00                         [facility]

[facility]



                                                                  [facility]

 

           SARS-COV-2 COVID-19            2021 Completed             [facility]

[facility] of



           PFIZER VACCINE            00:00:00                         [facility]

[facility]



                                                                  [facility]

 

           SARS-COV-2 COVID-19            2021 Completed             [facility]

[facility]



           PFIZER VACCINE            00:00:00                         [facility]

[facility]



                                                                  [facility]

 

           SARS-COV-2 COVID-19            2021 Completed             [facility]

[facility]



           PFIZER VACCINE            00:00:00                         [facility]

[facility]



                                                                  [facility]

 

           SARS-COV-2 COVID-19            2021 Completed             [facility]

[facility] of



           PFIZER VACCINE            00:00:00                         [facility]

[facility]



                                                                  [facility]

 

           SARS-COV-2 COVID-19            2021 Completed             [facility]

[facility] of



           PFIZER VACCINE            00:00:00                         [facility]

[facility]



                                                                  [facility]

 

           SARS-COV-2 COVID-19            2021 Completed             [facility]

[facility] of



           PFIZER VACCINE            00:00:00                         [facility]

[facility]



                                                                  [facility]

 

           SARS-COV-2 COVID-19            2021 Completed             [facility]

[facility] of



           PFIZER VACCINE            00:00:00                         [facility]

[facility]



                                                                  [facility]

 

           SARS-COV-2 COVID-19            2021 Completed             [facility]

[facility] of



           PFIZER VACCINE            00:00:00                         [facility]

[facility]



                                                                  [facility]

 

           SARS-COV-2 COVID-19            2021 Completed             [facility]

[facility] of



           PFIZER VACCINE            00:00:00                         [facility]

[facility]



                                                                  [facility]

 

           SARS-COV-2 COVID-19            2021 Completed             [facility]

[facility] of



           PFIZER VACCINE            00:00:00                         [facility]

 

           SARS-COV-2 COVID-19            2021 Completed             [facility]

rs[facility] of



           PFIZER VACCINE            00:00:00                         [facility]

 

           SARS-COV-2 COVID-19            2021 Completed             [facility]

[facility] of



           PFIZER VACCINE            00:00:00                         [facility]

[facility]



                                                                  [facility]

 

           SARS-COV-2 COVID-19            2021 Completed             [facility]

[facility] of



           PFIZER VACCINE            00:00:00                         [facility]

[facility]



                                                                  [facility]

 

           SARS-COV-2 COVID-19            2021 Completed             [facility]

[facility] of



           PFIZER VACCINE            00:00:00                         [facility]

[facility]



                                                                  [facility]

 

           SARS-COV-2 COVID-19            2021 Completed             [facility]

[facility] of



           PFIZER VACCINE            00:00:00                         [facility]

[facility]



                                                                  [facility]

 

           SARS-COV-2 COVID-19            2021 Completed             [facility]

[facility] of



           PFIZER VACCINE            00:00:00                         [facility]

[facility]



                                                                  [facility]

 

           SARS-COV-2 COVID-19            2021 Completed             [facility]

[facility] of



           PFIZER VACCINE            00:00:00                         [facility]

[facility]



                                                                  [facility]

 

           SARS-COV-2 COVID-19            2021 Completed             [facility]

[facility] of



           PFIZER VACCINE            00:00:00                         [facility]

[facility]



                                                                  [facility]

 

           SARS-COV-2 COVID-19            2021 Completed             [facility]

[facility] of



           PFIZER VACCINE            00:00:00                         [facility]

[facility]



                                                                  [facility]

 

           SARS-COV-2 COVID-19            2021 Completed             [facility]

[facility] of



           PFIZER VACCINE            00:00:00                         [facility]

[facility]



                                                                  [facility]

 

           SARS-COV-2 COVID-19            2021 Completed             [facility]

[facility] of



           PFIZER VACCINE            00:00:00                         [facility]

[facility]



                                                                  [facility]

 

           SARS-COV-2 COVID-19            2021 Completed             [facility]

[facility] of



           PFIZER VACCINE            00:00:00                         [facility]

[facility]



                                                                  [facility]

 

           SARS-COV-2 COVID-19            2021 Completed             [facility]

[facility] of



           PFIZER VACCINE            00:00:00                         [facility]

[facility]



                                                                  [facility]

 

           SARS-COV-2 COVID-19            2021 Completed             [facility]

rs[facility] of



           PFIZER VACCINE            00:00:00                         [facility]

[facility]



                                                                  [facility]

 

           SARS-COV-2 COVID-19            2021 Completed             [facility]

[facility] of



           PFIZER VACCINE            00:00:00                         [facility]

[facility]



                                                                  [facility]

 

           SARS-COV-2 COVID-19            2021 Completed             [facility]

[facility] of



           PFIZER VACCINE            00:00:00                         [facility]

[facility]



                                                                  [facility]

 

           SARS-COV-2 COVID-19            2021 Completed             [facility]

[facility] of



           PFIZER VACCINE            00:00:00                         [facility]

 

           SARS-COV-2 COVID-19            2021 Completed             [facility]ive

rs[facility] of



           PFIZER VACCINE            00:00:00                         [facility]

 

           SARS-COV-2 COVID-19            2021 Completed             [facility]

rs[facility] of



           PFIZER VACCINE            00:00:00                         [facility]

 

           SARS-COV-2 COVID-19            2021 Completed             [facility]

rs[facility] of



           PFIZER VACCINE            00:00:00                         [facility]

 

           Pneumococcal            2021 Completed             [facility]

[facility]



           Polysaccharide,            00:00:00                         [facility]

[facility]



           PPSV23 (PNEUMOVAX)                                             [facility]

 

           Influenza Virus            2021 Completed             [facility]

[facility] of



           Vaccine Quad .5 mL            00:00:00                         [facility]



           IM 6+ MO                                               [facility]

 

           Pneumococcal            2021 Completed             [facility]

[facility]



           Polysaccharide,            00:00:00                         [facility]



           PPSV23 (PNEUMOVAX)                                             [facility]

 

           Influenza Virus            2021 Completed             [facility]

[facility] of



           Vaccine Quad .5 mL            00:00:00                         [facility]



           IM 6+ MO                                               [facility]

 

           Pneumococcal            2021 Completed             [facility]

[facility]



           Polysaccharide,            00:00:00                         [facility]



           PPSV23 (PNEUMOVAX)                                             [facility]

 

           Influenza Virus            2021 Completed             [facility]

[facility] of



           Vaccine Quad .5 mL            00:00:00                         [facility]



           IM 6+ MO                                               [facility]

 

           Pneumococcal            2021 Completed             [facility]

[facility]



           Polysaccharide,            00:00:00                         [facility]



           PPSV23 (PNEUMOVAX)                                             [facility]anch

 

           Influenza Virus            2021 Completed             [facility]

[facility] of



           Vaccine Quad .5 mL            00:00:00                         [facility]



           IM 6+ MO                                               [facility]

 

           Pneumococcal            2021 Completed             [facility]

[facility]



           Polysaccharide,            00:00:00                         [facility]



           PPSV23 (PNEUMOVAX)                                             [facility]anch

 

           Influenza Virus            2021 Completed             [facility]

[facility] of



           Vaccine Quad .5 mL            00:00:00                         [facility]



           IM 6+ MO                                               [facility]

 

           Pneumococcal            2021 Completed             [facility]

f



           Polysaccharide,            00:00:00                         [facility]



           PPSV23 (PNEUMOVAX)                                             [facility]anch

 

           Influenza Virus            2021 Completed             [facility]

[facility] of



           Vaccine Quad .5 mL            00:00:00                         [facility]



           IM 6+ MO                                               [facility]

 

           Pneumococcal            2021 Completed             [facility]

f



           Polysaccharide,            00:00:00                         [facility]



           PPSV23 (PNEUMOVAX)                                             [facility]anch

 

           Influenza Virus            2021 Completed             [facility]

[facility] of



           Vaccine Quad .5 mL            00:00:00                         [facility]



           IM 6+ MO                                               [facility]

 

           Pneumococcal            2021 Completed             [facility]

f



           Polysaccharide,            00:00:00                         [facility]



           PPSV23 (PNEUMOVAX)                                             [facility]anch

 

           Influenza Virus            2021 Completed             [facility]

[facility] of



           Vaccine Quad .5 mL            00:00:00                         [facility]



           IM 6+ MO                                               [facility]

 

           Pneumococcal            2021 Completed             [facility]

f



           Polysaccharide,            00:00:00                         [facility]



           PPSV23 (PNEUMOVAX)                                             [facility]

 

           Influenza Virus            2021 Completed             [facility]

[facility] of



           Vaccine Quad .5 mL            00:00:00                         [facility]



           IM 6+ MO                                               [facility]

 

           Pneumococcal            2021 Completed             [facility]

f



           Polysaccharide,            00:00:00                         [facility]



           PPSV23 (PNEUMOVAX)                                             [facility]

 

           Influenza Virus            2021 Completed             [facility]

[facility] of



           Vaccine Quad .5 mL            00:00:00                         [facility]



           IM 6+ MO                                               [facility]

 

           Pneumococcal            2021 Completed             [facility]

f



           Polysaccharide,            00:00:00                         [facility]



           PPSV23 (PNEUMOVAX)                                             [facility]

 

           Influenza Virus            2021 Completed             [facility]

[facility] of



           Vaccine Quad .5 mL            00:00:00                         [facility]



           IM 6+ MO                                               [facility]

 

           Pneumococcal            2021 Completed             [facility]

f



           Polysaccharide,            00:00:00                         [facility]



           PPSV23 (PNEUMOVAX)                                             [facility]anch

 

           Influenza Virus            2021 Completed             [facility]

[facility] of



           Vaccine Quad .5 mL            00:00:00                         [facility]



           IM 6+ MO                                               [facility]

 

           Pneumococcal            2021 Completed             [facility]

[facility]



           Polysaccharide,            00:00:00                         [facility]



           PPSV23 (PNEUMOVAX)                                             Branch

 

           Influenza Virus            2021 Completed             [facility]

[facility] of



           Vaccine Quad .5 mL            00:00:00                         [facility]



           IM 6+ MO                                               [facility]

 

           Pneumococcal            2021 Completed             [facility]

[facility]



           Polysaccharide,            00:00:00                         [facility]



           PPSV23 (PNEUMOVAX)                                             [facility]anch

 

           Influenza Virus            2021 Completed             [facility]

[facility] of



           Vaccine Quad .5 mL            00:00:00                         [facility]



           IM 6+ MO                                               [facility]anch

 

           Pneumococcal            2021 Completed             [facility]

f



           Polysaccharide,            00:00:00                         [facility]



           PPSV23 (PNEUMOVAX)                                             Branch

 

           Influenza Virus            2021 Completed             Univers[facility]

[facility] of



           Vaccine Quad .5 mL            00:00:00                         [facility]



           IM 6+ MO                                               [facility]anch

 

           Pneumococcal            2021 Completed             [facility]

f



           Polysaccharide,            00:00:00                         [facility]



           PPSV23 (PNEUMOVAX)                                             [facility]

 

           Influenza Virus            2021 Completed             [facility]

[facility]



           Vaccine Quad .5 mL            00:00:00                         [facility]



           IM 6+ MO                                               [facility]

 

           Pneumococcal            2021 Completed             [facility]

[facility]



           Polysaccharide,            00:00:00                         [facility]

[facility]



           PPSV23 (PNEUMOVAX)                                             [facility]

 

           Influenza Virus            2021 Completed             [facility]

[facility]



           Vaccine Quad .5 mL            00:00:00                         [facility]



           IM 6+ MO                                               [facility]

 

           Pneumococcal            2021 Completed             [facility]

[facility]



           Polysaccharide,            00:00:00                         [facility]

[facility]



           PPSV23 (PNEUMOVAX)                                             [facility]

 

           Influenza Virus            2021 Completed             [facility]

[facility]



           Vaccine Quad .5 mL            00:00:00                         [facility]



           IM 6+ MO                                               [facility]

 

           Pneumococcal            2021 Completed             [facility]

[facility]



           Polysaccharide,            00:00:00                         [facility]

[facility]



           PPSV23 (PNEUMOVAX)                                             [facility]

 

           Influenza Virus            2021 Completed             [facility]

[facility]



           Vaccine Quad .5 mL            00:00:00                         [facility]



           IM 6+ MO                                               [facility]

 

           Pneumococcal            2021 Completed             [facility]

[facility]



           Polysaccharide,            00:00:00                         [facility]



           PPSV23 (PNEUMOVAX)                                             [facility]

 

           Influenza Virus            2021 Completed             [facility]

[facility]



           Vaccine Quad .5 mL            00:00:00                         [facility]



           IM 6+ MO                                               [facility]

 

           Pneumococcal            2021 Completed             [facility]

[facility]



           Polysaccharide,            00:00:00                         [facility]

[facility]



           PPSV23 (PNEUMOVAX)                                             [facility]

 

           Influenza Virus            2021 Completed             [facility]

[facility]



           Vaccine Quad .5 mL            00:00:00                         [facility]



           IM 6+ MO                                               [facility]

 

           Pneumococcal            2021 Completed             [facility]

[facility]



           Polysaccharide,            00:00:00                         [facility]

[facility]



           PPSV23 (PNEUMOVAX)                                             [facility]

 

           Influenza Virus            2021 Completed             [facility]

[facility] of



           Vaccine Quad .5 mL            00:00:00                         [facility]



           IM 6+ MO                                               [facility]

 

           Pneumococcal            2021 Completed             [facility]

[facility]



           Polysaccharide,            00:00:00                         [facility]

[facility]



           PPSV23 (PNEUMOVAX)                                             [facility]

 

           Influenza Virus            2021 Completed             [facility]

[facility] of



           Vaccine Quad .5 mL            00:00:00                         [facility]



           IM 6+ MO                                               [facility]

 

           Pneumococcal            2021 Completed             [facility]

[facility]



           Polysaccharide,            00:00:00                         [facility]



           PPSV23 (PNEUMOVAX)                                             [facility]

 

           Influenza Virus            2021 Completed             [facility]

[facility]



           Vaccine Quad .5 mL            00:00:00                         [facility]



           IM 6+ MO                                               [facility]

 

           Pneumococcal            2021 Completed             [facility]

[facility]



           Polysaccharide,            00:00:00                         [facility]



           PPSV23 (PNEUMOVAX)                                             [facility]

 

           Influenza Virus            2021 Completed             [facility]

[facility]



           Vaccine Quad .5 mL            00:00:00                         [facility]



           IM 6+ MO                                               [facility]

 

           Pneumococcal            2021 Completed             [facility]

[facility]



           Polysaccharide,            00:00:00                         [facility]



           PPSV23 (PNEUMOVAX)                                             [facility]

 

           Influenza Virus            2021 Completed             [facility]

[facility] of



           Vaccine Quad .5 mL            00:00:00                         [facility] 6+ MO                                               [facility]

 

           Pneumococcal            2021 Completed             [facility]

f



           Polysaccharide,            00:00:00                         [facility]

[facility]



           PPSV23 (PNEUMOVAX)                                             [facility]

 

           Influenza Virus            2021 Completed             [facility]

[facility] of



           Vaccine Quad .5 mL            00:00:00                         [facility] 6+ MO                                               [facility]

 

           Pneumococcal            2021 Completed             [facility]

f



           Polysaccharide,            00:00:00                         [facility]

[facility]



           PPSV23 (PNEUMOVAX)                                             [facility]

 

           Influenza Virus            2021 Completed             [facility]

[facility] of



           Vaccine Quad .5 mL            00:00:00                         [facility] 6+ MO                                               [facility]

 

           Pneumococcal            2021 Completed             [facility]

f



           Polysaccharide,            00:00:00                         [facility]

[facility]



           PPSV23 (PNEUMOVAX)                                             [facility]

 

           Influenza Virus            2021 Completed             [facility]

[facility] of



           Vaccine Quad .5 mL            00:00:00                         [facility]



           IM 6+ MO                                               [facility]

 

           Pneumococcal            2021 Completed             [facility]

[facility]



           Polysaccharide,            00:00:00                         [facility]

[facility]



           PPSV23 (PNEUMOVAX)                                             [facility]

 

           Influenza Virus            2021 Completed             [facility]

[facility] of



           Vaccine Quad .5 mL            00:00:00                         [facility]



           IM 6+ MO                                               [facility]

 

           Pneumococcal            2021 Completed             [facility]

[facility]



           Polysaccharide,            00:00:00                         [facility]

[facility]



           PPSV23 (PNEUMOVAX)                                             [facility]

 

           Influenza Virus            2021 Completed             [facility]

[facility]



           Vaccine Quad .5 mL            00:00:00                         [facility]



           IM 6+ MO                                               [facility]

 

           Pneumococcal            2021 Completed             [facility]

f



           Polysaccharide,            00:00:00                         [facility]



           PPSV23 (PNEUMOVAX)                                             [facility]

 

           Influenza Virus            2021 Completed             [facility]

[facility]



           Vaccine Quad .5 mL            00:00:00                         [facility]



           IM 6+ MO                                               [facility]

 

           Pneumococcal            2021 Completed             [facility]

f



           Polysaccharide,            00:00:00                         [facility]



           PPSV23 (PNEUMOVAX)                                             [facility]

 

           Influenza Virus            2021 Completed             [facility]

[facility]



           Vaccine Quad .5 mL            00:00:00                         [facility]



           IM 6+ MO                                               [facility]

 

           Pneumococcal            2021 Completed             [facility]

f



           Polysaccharide,            00:00:00                         [facility]

[facility]



           PPSV23 (PNEUMOVAX)                                             [facility]

 

           Influenza Virus            2021 Completed             [facility]

[facility] of



           Vaccine Quad .5 mL            00:00:00                         [facility]



           IM 6+ MO                                               [facility]

 

           Pneumococcal            2021 Completed             [facility]

f



           Polysaccharide,            00:00:00                         [facility]

[facility]



           PPSV23 (PNEUMOVAX)                                             [facility]

 

           Influenza Virus            2021 Completed             [facility]

[facility]



           Vaccine Quad .5 mL            00:00:00                         [facility]



           IM 6+ MO                                               [facility]

 

           Pneumococcal            2021 Completed             [facility]

[facility]



           Polysaccharide,            00:00:00                         [facility]

[facility]



           PPSV23 (PNEUMOVAX)                                             [facility]

 

           Influenza Virus            2021 Completed             [facility]

[facility]



           Vaccine Quad .5 mL            00:00:00                         [facility]



           IM 6+ MO                                               [facility]

 

           Pneumococcal            2021 Completed             [facility]

[facility]



           Polysaccharide,            00:00:00                         [facility]

[facility]



           PPSV23 (PNEUMOVAX)                                             [facility]

 

           Influenza Virus            2021 Completed             [facility]

[facility]



           Vaccine Quad .5 mL            00:00:00                         [facility]



           IM 6+ MO                                               [facility]

 

           Pneumococcal            2021 Completed             [facility]

f



           Polysaccharide,            00:00:00                         [facility]

[facility]



           PPSV23 (PNEUMOVAX)                                             [facility]

 

           Influenza Virus            2021 Completed             [facility]

[facility]



           Vaccine Quad .5 mL            00:00:00                         [facility]



           IM 6+ MO                                               [facility]

 

           Pneumococcal            2021 Completed             [facility]

[facility]



           Polysaccharide,            00:00:00                         [facility]

[facility]



           PPSV23 (PNEUMOVAX)                                             [facility]

 

           Influenza Virus            2021 Completed             [facility]

[facility]



           Vaccine Quad .5 mL            00:00:00                         [facility]



           IM 6+ MO                                               [facility]

 

           Pneumococcal            2021 Completed             [facility]

[facility]



           Polysaccharide,            00:00:00                         [facility]

[facility]



           PPSV23 (PNEUMOVAX)                                             [facility]

 

           Influenza Virus            2021 Completed             [facility]

[facility]



           Vaccine Quad .5 mL            00:00:00                         [facility]



           IM 6+ MO                                               [facility]

 

           Pneumococcal            2021 Completed             [facility]

[facility]



           Polysaccharide,            00:00:00                         [facility]

[facility]



           PPSV23 (PNEUMOVAX)                                             [facility]

 

           Influenza Virus            2021 Completed             [facility]

[facility]



           Vaccine Quad .5 mL            00:00:00                         [facility]



           IM 6+ MO                                               [facility]

 

           Pneumococcal            2021 Completed             [facility]

[facility]



           Polysaccharide,            00:00:00                         [facility]



           PPSV23 (PNEUMOVAX)                                             [facility]

 

           Influenza Virus            2021 Completed             [facility]

[facility]



           Vaccine Quad .5 mL            00:00:00                         [facility]



           IM 6+ MO                                               [facility]

 

           Pneumococcal            2021 Completed             [facility]

[facility]



           Polysaccharide,            00:00:00                         [facility]



           PPSV23 (PNEUMOVAX)                                             [facility]

 

           Influenza Virus            2021 Completed             [facility]

[facility]



           Vaccine Quad .5 mL            00:00:00                         [facility]



           IM 6+ MO                                               [facility]

 

           Pneumococcal            2021 Completed             [facility]

[facility]



           Polysaccharide,            00:00:00                         [facility]

[facility]



           PPSV23 (PNEUMOVAX)                                             [facility]

 

           Influenza Virus            2021 Completed             [facility]

[facility]



           Vaccine Quad .5 mL            00:00:00                         [facility]



           IM 6+ MO                                               [facility]

 

           Pneumococcal            2021 Completed             [facility]

[facility]



           Polysaccharide,            00:00:00                         [facility]

[facility]



           PPSV23 (PNEUMOVAX)                                             [facility]

 

           Influenza Virus            2021 Completed             [facility]

[facility] of



           Vaccine Quad .5 mL            00:00:00                         [facility]



           IM 6+ MO                                               [facility]

 

           Pneumococcal            2021 Completed             [facility]

[facility]



           Polysaccharide,            00:00:00                         [facility]

[facility]



           PPSV23 (PNEUMOVAX)                                             [facility]

 

           Influenza Virus            2021 Completed             [facility]

[facility]



           Vaccine Quad .5 mL            00:00:00                         [facility]



           IM 6+ MO                                               [facility]

 

           Pneumococcal            2021 Completed             [facility]

[facility]



           Polysaccharide,            00:00:00                         [facility]

[facility]



           PPSV23 (PNEUMOVAX)                                             [facility]

 

           Influenza Virus            2021 Completed             [facility]

[facility]



           Vaccine Quad .5 mL            00:00:00                         [facility]



           IM 6+ MO                                               [facility]

 

           Pneumococcal            2021 Completed             [facility]

[facility]



           Polysaccharide,            00:00:00                         [facility]



           PPSV23 (PNEUMOVAX)                                             [facility]

 

           Influenza Virus            2021 Completed             [facility]

[facility]



           Vaccine Quad .5 mL            00:00:00                         [facility]



           IM 6+ MO                                               [facility]

 

           Pneumococcal            2021 Completed             [facility]

[facility]



           Polysaccharide,            00:00:00                         [facility]

[facility]



           PPSV23 (PNEUMOVAX)                                             [facility]

 

           Influenza Virus            2021 Completed             [facility]

[facility] of



           Vaccine Quad .5 mL            00:00:00                         [facility]



           IM 6+ MO                                               [facility]

 

           Pneumococcal            2021 Completed             [facility]

[facility]



           Polysaccharide,            00:00:00                         [facility]

[facility]



           PPSV23 (PNEUMOVAX)                                             [facility]

 

           Influenza Virus            2021 Completed             [facility]

[facility] of



           Vaccine Quad .5 mL            00:00:00                         [facility]



           IM 6+ MO                                               [facility]

 

           Pneumococcal            2021 Completed             [facility]

f



           Polysaccharide,            00:00:00                         [facility]

[facility]



           PPSV23 (PNEUMOVAX)                                             [facility]

 

           Influenza Virus            2021 Completed             [facility]

[facility] of



           Vaccine Quad .5 mL            00:00:00                         [facility]



           IM 6+ MO                                               [facility]

 

           Pneumococcal            2021 Completed             [facility]

f



           Polysaccharide,            00:00:00                         [facility]

[facility]



           PPSV23 (PNEUMOVAX)                                             [facility]

 

           Influenza Virus            2021 Completed             [facility]

[facility] of



           Vaccine Quad .5 mL            00:00:00                         [facility]



           IM 6+ MO                                               [facility]

 

           Pneumococcal            2021 Completed             [facility]

[facility]



           Polysaccharide,            00:00:00                         [facility]

[facility]



           PPSV23 (PNEUMOVAX)                                             [facility]

 

           Influenza Virus            2021 Completed             [facility]

[facility] of



           Vaccine Quad .5 mL            00:00:00                         [facility]



           IM 6+ MO                                               [facility]

 

           Pneumococcal            2021 Completed             [facility]

[facility]



           Polysaccharide,            00:00:00                         [facility]

[facility]



           PPSV23 (PNEUMOVAX)                                             [facility]

 

           Influenza Virus            2021 Completed             [facility]

[facility] of



           Vaccine Quad .5 mL            00:00:00                         [facility]



           IM 6+ MO                                               [facility]

 

           Pneumococcal            2021 Completed             [facility]

f



           Polysaccharide,            00:00:00                         [facility]

[facility]



           PPSV23 (PNEUMOVAX)                                             [facility]

 

           Influenza Virus            2021 Completed             [facility]

[facility] of



           Vaccine Quad .5 mL            00:00:00                         [facility]



           IM 6+ MO                                               [facility]

 

           Pneumococcal            2021 Completed             [facility]

[facility]



           Polysaccharide,            00:00:00                         [facility]

[facility]



           PPSV23 (PNEUMOVAX)                                             [facility]

 

           Influenza Virus            2021 Completed             [facility]

[facility] of



           Vaccine Quad .5 mL            00:00:00                         [facility]



           IM 6+ MO                                               [facility]

 

           Pneumococcal            2021 Completed             [facility]

f



           Polysaccharide,            00:00:00                         [facility]

[facility]



           PPSV23 (PNEUMOVAX)                                             [facility]

 

           Influenza Virus            2021 Completed             [facility]

[facility] of



           Vaccine Quad .5 mL            00:00:00                         [facility]



           IM 6+ MO                                               [facility]

 

           Pneumococcal            2021 Completed             [facility]

f



           Polysaccharide,            00:00:00                         [facility]

[facility]



           PPSV23 (PNEUMOVAX)                                             [facility]

 

           Influenza Virus            2021 Completed             [facility]

[facility] of



           Vaccine Quad .5 mL            00:00:00                         [facility]



           IM 6+ MO                                               [facility]

 

           Pneumococcal            2021 Completed             [facility]

f



           Polysaccharide,            00:00:00                         [facility]

[facility]



           PPSV23 (PNEUMOVAX)                                             [facility]

 

           Influenza Virus            2021 Completed             [facility]

[facility] of



           Vaccine Quad .5 mL            00:00:00                         [facility]



           IM 6+ MO                                               [facility]

 

           Pneumococcal            2021 Completed             [facility]

[facility]



           Polysaccharide,            00:00:00                         [facility]

[facility]



           PPSV23 (PNEUMOVAX)                                             [facility]

 

           Influenza Virus            2021 Completed             [facility]

[facility] of



           Vaccine Quad .5 mL            00:00:00                         [facility]



           IM 6+ MO                                               [facility]

 

           Pneumococcal            2021 Completed             [facility]

[facility]



           Polysaccharide,            00:00:00                         [facility]

[facility]



           PPSV23 (PNEUMOVAX)                                             [facility]

 

           Influenza Virus            2021 Completed             [facility]

[facility] of



           Vaccine Quad .5 mL            00:00:00                         [facility]



           IM 6+ MO                                               [facility]

 

           Pneumococcal            2021 Completed             [facility]

f



           Polysaccharide,            00:00:00                         [facility]

[facility]



           PPSV23 (PNEUMOVAX)                                             [facility]

 

           Influenza Virus            2021 Completed             [facility]

[facility] of



           Vaccine Quad .5 mL            00:00:00                         [facility]



           IM 6+ MO                                               [facility]

 

           Pneumococcal            2021 Completed             [facility]

f



           Polysaccharide,            00:00:00                         [facility]

[facility]



           PPSV23 (PNEUMOVAX)                                             [facility]

 

           Influenza Virus            2021 Completed             [facility]

[facility] of



           Vaccine Quad .5 mL            00:00:00                         [facility]



           IM 6+ MO                                               [facility]anch







Vital Signs







             Vital Name   Observation Time Observation Value Comments     Source

 

             Systolic blood 2023 21:26:00 146 mm[Hg]                [facility]

[facility]ity of



             pressure                                            [facility]

 

             Diastolic blood 2023 21:26:00 84 mm[Hg]                 [facility]

[facility] of



             pressure                                            [facility]



                                                                 [facility]

 

             Heart rate   2023 21:26:00 100 /min                  [facility]

[facility]



                                                                 [facility]



                                                                 [facility]

 

             Body temperature 2023 21:26:00 37.06 Elen                 [facility]

ers[facility] of



                                                                 [facility]



                                                                 [facility]

 

             Respiratory rate 2023 21:26:00 16 /min                   [facility]

ers[facility] of



                                                                 [facility]



                                                                 [facility]

 

             Body height  2023 21:26:00 175.3 cm                  [facility]

[facility] of



                                                                 [facility]



                                                                 [facility]

 

             Body weight  2023 21:26:00 81.012 kg                 [facility]

[facility] of



                                                                 [facility]



                                                                 [facility]

 

             BMI          2023 21:26:00 26.37 kg/m2               [facility]

[facility] of



                                                                 [facility]



                                                                 [facility]

 

             Oxygen saturation in 2023 21:26:00 97 /min                   

[facility]



             Arterial blood by                                        [facility]

luis [facility]fredo



             Pulse oximetry                                        Branch

 

             Systolic blood 2023 03:48:31 127 mm[Hg]                [facility]

sity of



             pressure                                            [facility]



                                                                 [facility]

 

             Diastolic blood 2023 03:48:31 83 mm[Hg]                 [facility]

rsity of



             pressure                                            [facility]



                                                                 [facility]

 

             Heart rate   2023 03:48:31 75 /min                   [facility]

[facility] of



                                                                 [facility]



                                                                 [facility]

 

             Respiratory rate 2023 03:48:31 18 /min                   [facility]iv

ersity of



                                                                 [facility]



                                                                 [facility]

 

             Oxygen saturation in 2023 03:48:31 98 /min                   

[facility]



             Arterial blood by                                        [facility]

lu[facility]fred[facility]



             Pulse oximetry                                        [facility]

 

             Body temperature 2023 00:53:00 36.83 Elen                 [facility]iv

ersity of



                                                                 [facility]



                                                                 [facility]

 

             Body height  2023 00:53:00 175.3 cm                  [facility]

[facility] of



                                                                 [facility]



                                                                 [facility]

 

             Body weight  2023 00:53:00 81.375 kg                 [facility]

[facility] of



                                                                 [facility]



                                                                 [facility]

 

             BMI          2023 00:53:00 26.49 kg/m2               [facility]

[facility] of



                                                                 [facility]



                                                                 [facility]

 

             Systolic blood 2023 20:52:00 101 mm[Hg]                [facility]

sity of



             pressure                                            [facility]



                                                                 [facility]

 

             Diastolic blood 2023 20:52:00 63 mm[Hg]                 [facility]

rs[facility] of



             pressure                                            [facility]



                                                                 [facility]

 

             Heart rate   2023 20:52:00 97 /min                   [facility]

[facility] of



                                                                 [facility]



                                                                 [facility]

 

             Body temperature 2023 20:52:00 36.5 Elen                  [facility]

ers[facility] of



                                                                 [facility]



                                                                 [facility]

 

             Body height  2023 20:52:00 175.3 cm                  [facility]

[facility] of



                                                                 [facility]



                                                                 [facility]

 

             Body weight  2023 20:52:00 79.833 kg                 [facility]

[facility] of



                                                                 [facility]



                                                                 [facility]

 

             BMI          2023 20:52:00 25.99 kg/m2               [facility]

[facility] of



                                                                 [facility]



                                                                 [facility]

 

             Oxygen saturation in 2023 20:52:00 98 /min                   

[facility]



             Arterial blood by                                        [facility]

luis alfred[facility]



             Pulse oximetry                                        [facility]

 

             Systolic blood 2023 22:44:00 146 mm[Hg]                [facility]

[facility]ity of



             pressure                                            [facility]



                                                                 [facility]

 

             Diastolic blood 2023 22:44:00 81 mm[Hg]                 [facility]

rs[facility] of



             pressure                                            [facility]



                                                                 [facility]

 

             Heart rate   2023 22:44:00 99 /min                   [facility]

[facility] of



                                                                 [facility]



                                                                 [facility]

 

             Body temperature 2023 22:44:00 36.78 Elen                 [facility]

ersity of



                                                                 [facility]



                                                                 [facility]

 

             Respiratory rate 2023 22:44:00 18 /min                   [facility]

ers[facility] of



                                                                 [facility]



                                                                 [facility]

 

             Body height  2023 22:44:00 175.3 cm                  [facility]

[facility] of



                                                                 [facility]



                                                                 [facility]

 

             Body weight  2023 22:44:00 81.194 kg                 [facility]

[facility] of



                                                                 [facility]



                                                                 [facility]

 

             BMI          2023 22:44:00 26.43 kg/m2               [facility]

[facility]



                                                                 [facility]



                                                                 [facility]

 

             Oxygen saturation in 2023 22:44:00 98 /min                   

[facility]



             Arterial blood by                                        [facility]

[facility]



             Pulse oximetry                                        [facility]anch

 

             Systolic blood 2023 20:36:56 125 mm[Hg]                [facility]

[facility]ity of



             pressure                                            [facility]



                                                                 [facility]

 

             Diastolic blood 2023 20:36:56 78 mm[Hg]                 [facility]

rsity of



             pressure                                            [facility]



                                                                 [facility]

 

             Heart rate   2023 20:36:56 71 /min                   [facility]

[facility] of



                                                                 [facility]



                                                                 [facility]

 

             Respiratory rate 2023 20:36:56 18 /min                   [facility]

ers[facility] of



                                                                 [facility]



                                                                 [facility]

 

             Oxygen saturation in 2023 20:36:56 99 /min                   

[facility]



             Arterial blood by                                        [facility]

[facility]



             Pulse oximetry                                        [facility]

 

             Body temperature 2023 18:11:00 36.17 Elen                 [facility]

ersity of



                                                                 [facility]



                                                                 [facility]

 

             Body height  2023 18:11:00 175.3 cm                  [facility]

[facility] of



                                                                 [facility]



                                                                 [facility]

 

             Body weight  2023 18:11:00 81.194 kg                 [facility]

[facility] of



                                                                 [facility]



                                                                 [facility]

 

             BMI          2023 18:11:00 26.43 kg/m2               [facility]

[facility] of



                                                                 [facility]



                                                                 [facility]

 

             Systolic blood 2023 20:46:00 139 mm[Hg]                [facility]

[facility]ity of



             pressure                                            [facility]



                                                                 [facility]

 

             Diastolic blood 2023 20:46:00 90 mm[Hg]                 [facility]

rsity of



             pressure                                            [facility]



                                                                 [facility]

 

             Heart rate   2023 20:46:00 95 /min                   [facility]

[facility] of



                                                                 [facility]



                                                                 [facility]

 

             Body temperature 2023 20:46:00 36.61 Elen                 Univ

ersity of



                                                                 [facility]



                                                                 [facility]

 

             Respiratory rate 2023 20:46:00 14 /min                   [facility]iv

ersity of



                                                                 [facility]



                                                                 [facility]

 

             Body height  2023 20:46:00 175.3 cm                  [facility]

[facility] of



                                                                 [facility]



                                                                 [facility]

 

             Body weight  2023 20:46:00 81.194 kg                 [facility]

[facility] of



                                                                 [facility]



                                                                 [facility]

 

             BMI          2023 20:46:00 26.43 kg/m2               [facility]

[facility] of



                                                                 [facility]



                                                                 [facility]

 

             Oxygen saturation in 2023 20:46:00 99 /min                   

[facility]



             Arterial blood by                                        [facility]

[facility]



             Pulse oximetry                                        [facility]anch

 

             Systolic blood 2023 04:30:00 115 mm[Hg]                [facility]

sity of



             pressure                                            [facility]



                                                                 [facility]

 

             Diastolic blood 2023 04:30:00 62 mm[Hg]                 [facility]

rsity of



             pressure                                            [facility]



                                                                 [facility]

 

             Heart rate   2023 04:30:00 79 /min                   [facility]

[facility] of



                                                                 [facility]



                                                                 [facility]

 

             Respiratory rate 2023 04:30:00 18 /min                   [facility]

ersity of



                                                                 [facility]



                                                                 [facility]

 

             Oxygen saturation in 2023 04:30:00 97 /min                   

[facility]



             Arterial blood by                                        [facility]

[facility]



             Pulse oximetry                                        [facility]anch

 

             Body temperature 2023 02:34:00 36.61 Elen                 [facility]

ersity of



                                                                 [facility]



                                                                 [facility]

 

             Body height  2023 02:34:00 170.2 cm                  [facility]

[facility] of



                                                                 [facility]



                                                                 [facility]

 

             Body weight  2023 02:34:00 81.194 kg                 [facility]

[facility] of



                                                                 [facility]



                                                                 [facility]

 

             BMI          2023 02:34:00 28.04 kg/m2               [facility]

[facility] of



                                                                 [facility]



                                                                 [facility]

 

             Heart rate   2023 04:18:24 92 /min                   [facility]

[facility] of



                                                                 [facility]



                                                                 [facility]

 

             Respiratory rate 2023 04:18:24 15 /min                   [facility]iv

ersity of



                                                                 [facility]



                                                                 [facility]

 

             Oxygen saturation in 2023 04:18:24 97 /min                   

[facility]



             Arterial blood by                                        [facility]

luis alfredo



             Pulse oximetry                                        [facility]

 

             Systolic blood 2023 04:00:00 139 mm[Hg]                [facility]

sity of



             pressure                                            [facility]



                                                                 [facility]

 

             Diastolic blood 2023 04:00:00 79 mm[Hg]                 [facility]

rsity of



             pressure                                            [facility]



                                                                 [facility]

 

             Body temperature 2023 01:38:00 37 Elen                    [facility]

ersity of



                                                                 [facility]



                                                                 [facility]

 

             Body height  2023 01:38:00 170.2 cm                  [facility]

[facility] of



                                                                 [facility]



                                                                 [facility]

 

             Body weight  2023 01:38:00 81.194 kg                 [facility]

[facility] of



                                                                 [facility]



                                                                 [facility]

 

             BMI          2023 01:38:00 28.04 kg/m2               [facility]

[facility] of



                                                                 [facility]



                                                                 [facility]

 

             Systolic blood 2023 19:58:00 133 mm[Hg]                [facility]

sity of



             pressure                                            [facility]



                                                                 [facility]

 

             Diastolic blood 2023 19:58:00 80 mm[Hg]                 [facility]

rsity of



             pressure                                            [facility]



                                                                 [facility]

 

             Heart rate   2023 19:58:00 98 /min                   [facility]

[facility] of



                                                                 [facility]



                                                                 [facility]

 

             Body temperature 2023 19:58:00 36.33 Elen                 [facility]

ersity of



                                                                 [facility]



                                                                 [facility]

 

             Respiratory rate 2023 19:58:00 16 /min                   [facility]

ersity of



                                                                 [facility]



                                                                 [facility]

 

             Body height  2023 19:58:00 170.2 cm                  [facility]

[facility] of



                                                                 [facility]



                                                                 [facility]

 

             Body weight  2023 19:58:00 81.194 kg                 [facility]

[facility] of



                                                                 [facility]



                                                                 [facility]

 

             BMI          2023 19:58:00 28.04 kg/m2               [facility]

[facility] of



                                                                 [facility]



                                                                 [facility]

 

             Oxygen saturation in 2023 19:58:00 98 /min                   

[facility]



             Arterial blood by                                        [facility]

lu[facility] al[facility]



             Pulse oximetry                                        [facility]anch

 

             Systolic blood 2023 23:15:00 137 mm[Hg]                [facility]

[facility]ity of



             pressure                                            [facility]



                                                                 [facility]

 

             Diastolic blood 2023 23:15:00 92 mm[Hg]                 [facility]

rsity of



             pressure                                            [facility]



                                                                 [facility]

 

             Heart rate   2023 23:15:00 106 /min                  [facility]

[facility] of



                                                                 [facility]



                                                                 [facility]

 

             Body temperature 2023 23:15:00 36.61 Elen                 [facility]

ersity of



                                                                 [facility]



                                                                 [facility]

 

             Respiratory rate 2023 23:15:00 16 /min                   [facility]

ersity of



                                                                 [facility]



                                                                 [facility]

 

             Body height  2023 23:15:00 170.2 cm                  [facility]

[facility] of



                                                                 [facility]



                                                                 [facility]

 

             Body weight  2023 23:15:00 81.194 kg                 [facility]

[facility] of



                                                                 [facility]



                                                                 [facility]

 

             BMI          2023 23:15:00 28.04 kg/m2               [facility]

[facility] of



                                                                 [facility]



                                                                 [facility]

 

             Oxygen saturation in 2023 23:15:00 100 /min                  

[facility]



             Arterial blood by                                        [facility]

lu[facility] alfred[facility]



             Pulse oximetry                                        [facility]

 

             Systolic blood 2023 02:27:00 147 mm[Hg]                [facility]

sity of



             pressure                                            [facility]



                                                                 [facility]

 

             Diastolic blood 2023 02:27:00 91 mm[Hg]                 [facility]

rsity of



             pressure                                            [facility]



                                                                 [facility]

 

             Heart rate   2023 02:27:00 100 /min                  [facility]

[facility] of



                                                                 [facility]



                                                                 [facility]

 

             Body temperature 2023 02:27:00 36.39 Elen                 [facility]iv

ersity of



                                                                 [facility]



                                                                 [facility]

 

             Respiratory rate 2023 02:27:00 16 /min                   [facility]

ersity of



                                                                 [facility]



                                                                 [facility]

 

             Body height  2023 02:27:00 170.2 cm                  [facility]

[facility]



                                                                 [facility]



                                                                 [facility]

 

             Body weight  2023 02:27:00 82.555 kg                 [facility]

[facility] of



                                                                 [facility]



                                                                 [facility]

 

             BMI          2023 02:27:00 28.51 kg/m2               [facility]

[facility] of



                                                                 [facility]



                                                                 [facility]

 

             Oxygen saturation in 2023 02:27:00 100 /min                  

[facility]



             Arterial blood by                                        [facility]

luis alfredo



             Pulse oximetry                                        [facility]

 

             Systolic blood 2023 00:35:00 149 mm[Hg]                [facility]iver

sity of



             pressure                                            [facility]



                                                                 Branch

 

             Diastolic blood 2023 00:35:00 96 mm[Hg]                 Unive

rsity of



             pressure                                            [facility]



                                                                 Branch

 

             Heart rate   2023 00:35:00 96 /min                   Universi

ty of



                                                                 [facility]



                                                                 Branch

 

             Body temperature 2023 00:35:00 36.56 Elen                 Univ

ersity of



                                                                 [facility]



                                                                 Branch

 

             Respiratory rate 2023 00:35:00 18 /min                   Univ

ersity of



                                                                 [facility]



                                                                 Branch

 

             Oxygen saturation in 2023 00:35:00 97 /min                   

[facility]



             Arterial blood by                                        [facility]

luis alfredo



             Pulse oximetry                                        Branch

 

             Body weight  2023 14:58:00 82.237 kg                 Universi

ty of



                                                                 [facility]



                                                                 Branch

 

             BMI          2023 14:58:00 28.40 kg/m2               Universi

ty of



                                                                 [facility]



                                                                 Branch

 

             Systolic blood 2023 18:51:00 133 mm[Hg]                Univer

sity of



             pressure                                            [facility]



                                                                 Branch

 

             Diastolic blood 2023 18:51:00 88 mm[Hg]                 Unive

rsity of



             pressure                                            [facility]



                                                                 [place]

 

             Heart rate   2023 18:51:00 126 /min                  Universi

ty of



                                                                 [facility]



                                                                 [place]

 

             Body temperature 2023 18:51:00 35.94 Elen                 Univ

ersity of



                                                                 [facility]



                                                                 Branch

 

             Respiratory rate 2023 18:51:00 20 /min                   Univ

ersity of



                                                                 [facility]



                                                                 Branch

 

             Body weight  2023 18:51:00 83.008 kg                 Universi

ty of



                                                                 [facility]



                                                                 Branch

 

             BMI          2023 18:51:00 28.66 kg/m2               Universi

ty of



                                                                 [facility]



                                                                 [place]

 

             Oxygen saturation in 2023 18:51:00 97 /min                   

[facility]



             Arterial blood by                                        [facility]

luis alfredo



             Pulse oximetry                                        Branch

 

             Systolic blood 2022 17:00:00 110 mm[Hg]                Univer

sity of



             pressure                                            [facility]



                                                                 Branch

 

             Diastolic blood 2022 17:00:00 71 mm[Hg]                 Unive

rsity of



             pressure                                            [facility]



                                                                 Branch

 

             Heart rate   2022 17:00:00 116 /min                  Universi

ty of



                                                                 [facility]



                                                                 Branch

 

             Respiratory rate 2022 17:00:00 18 /min                   Univ

ersity of



                                                                 [facility]



                                                                 Branch

 

             Oxygen saturation in 2022 17:00:00 97 /min                   

[facility]



             Arterial blood by                                        [facility]

luis alfredo



             Pulse oximetry                                        Branch

 

             Body temperature 2022 14:04:00 37.89 Elen                 Univ

ersity of



                                                                 [facility]



                                                                 Branch

 

             Body weight  2022 14:04:00 81.647 kg                 Universi

ty of



                                                                 [facility]



                                                                 Branch

 

             BMI          2022 14:04:00 28.19 kg/m2               Universi

ty of



                                                                 [facility]



                                                                 Branch

 

             Systolic blood 2022-10-28 17:25:00 128 mm[Hg]                Univer

sity of



             pressure                                            [facility]



                                                                 Branch

 

             Diastolic blood 2022-10-28 17:25:00 87 mm[Hg]                 Unive

rsity of



             pressure                                            [facility]



                                                                 Branch

 

             Heart rate   2022-10-28 17:25:00 121 /min                  Universi

ty of



                                                                 [facility]



                                                                 Branch

 

             Body temperature 2022-10-28 17:25:00 36.56 Elen                 Univ

ersity of



                                                                 [facility]



                                                                 Branch

 

             Respiratory rate 2022-10-28 17:25:00 16 /min                   Univ

ersity of



                                                                 [facility]



                                                                 Branch

 

             Body height  2022-10-28 17:25:00 170.2 cm                  Universi

ty of



                                                                 [facility]



                                                                 Branch

 

             Body weight  2022-10-28 17:25:00 84.823 kg                 Universi

ty of



                                                                 [facility]

 

             BMI          2022-10-28 17:25:00 29.29 kg/m2               Universi

ty of



                                                                 [facility]



                                                                 Branch

 

             Oxygen saturation in 2022-10-28 17:25:00 99 /min                   

[facility]



             Arterial blood by                                        [facility]

luis alfredo



             Pulse oximetry                                        Branch

 

             Systolic blood 2022 19:26:00 137 mm[Hg]                Univer

sity of



             pressure                                            [facility]



                                                                 Branch

 

             Diastolic blood 2022 19:26:00 89 mm[Hg]                 Unive

rsity of



             pressure                                            [facility]



                                                                 Branch

 

             Heart rate   2022 19:26:00 122 /min                  Universi

ty of



                                                                 [facility]



                                                                 [place]

 

             Body temperature 2022 19:26:00 37.5 Elen                  Univ

ersity of



                                                                 [facility]



                                                                 Branch

 

             Body height  2022 19:20:00 170.2 cm                  Universi

ty of



                                                                 [facility]



                                                                 Branch

 

             Body weight  2022 19:20:00 81.557 kg                 Universi

ty of



                                                                 [facility]



                                                                 Branch

 

             BMI          2022 19:20:00 28.16 kg/m2               Universi

ty of



                                                                 [facility]



                                                                 Branch

 

             Oxygen saturation in 2022 19:20:00 98 /min                   

[facility]



             Arterial blood by                                        [facility]

luis alfredo



             Pulse oximetry                                        Branch







Procedures







                Procedure       Date / Time     Performing Clinician Source



                                Performed                       

 

                XR FOOT <3 VW LEFT 2023 22:52:36 Shira Coy

[facility]

 

                ASSIGNMENT OF BENEFITS 2023 22:14:05 Doctor Unassigned, No

 [facility]

 

                CONSENT/REFUSAL FOR 2023 21:19:18 Doctor Unassigned, No [facility]



                DIAGNOSIS AND TREATMENT                 [facility]

 

                EKG-12 LEAD     2023 03:35:08 Tracy Rudd [facility]

 

                XR CHEST 1 VW   2023 01:56:47 Tracy Rudd [facility]

 

                LIPASE          2023 01:44:00 Tracy Rudd [facility]

 

                MAGNESIUM       2023 01:44:00 Tracy Rudd [facility]

 

                TROPONIN I      2023 01:44:00 Tracy Rudd [facility]

 

                COMP. METABOLIC PANEL 2023 01:44:00 Tracy Rudd [facility]



                ([id number])                                         [facility]

 

                CBC WITH DIFF   2023 01:44:00 Tracy Rudd [facility]

 

                COVID-19 (ID NOW RAPID 2023 01:44:00 Tracy Rudd Un

[facility]



                [facility]

 

                CONSENT/REFUSAL FOR 2023 00:31:17 Doctor Unassigned, No [facility]



                [facility] TREATMENT                 [facility]

 

                MEDICATION CORRESPONDENCE 2023 05:01:00 Doctor Unassigned,

 No [facility]

 

                POCT HEMOGLOBIN A1C TEST 2023 21:14:00 Chast[facility]orzano [facility]

 

                TROPONIN I      2023 23:45:00 Andriy Weaver [facility]

 

                COMP. METABOLIC PANEL 2023 23:45:00 Andriy Weaver [facility]



                ([id number])                                         [facility]

 

                CBC WITH DIFF   2023 23:45:00 Andriy Weaver [facility]

 

                D-DIMER         2023 23:45:00 Andriy Weaver [facility]

 

                ASSIGNMENT OF BENEFITS 2023 23:13:25 Doctor Unassigned, No

 [facility]

 

                XR CHEST 1 VW   2023 23:07:11 Andriy Weaver [facility]

 

                CONSENT/REFUSAL FOR 2023 22:38:13 Doctor Unassigned, No Un

[facility] 

[place]



                DIAGNOSIS AND TREATMENT                 Name            [facility]

 

                ASSIGNMENT OF BENEFITS 2023 20:10:11 Doctor Unassigned, No

 [facility]

 

                URINALYSIS      2023 18:21:00 Lorene Guerrero [facility]

 

                CONSENT/REFUSAL FOR 2023 17:58:31 Doctor Unassigned, No Un

[facility] 

[place]



                DIAGNOSIS AND TREATMENT                 Name            [facility]

 

                ASSIGNMENT OF BENEFITS 2023 21:18:37 Doctor Unassigned, No

 [facility]

 

                POCT GLUCOSE (AUTOMATED) 2023 21:07:00 Olaf Strickland

[facility]

 

                CONSENT/REFUSAL FOR 2023 20:38:11 Doctor Unassigned, No Un

[facility] 

[place]



                DIAGNOSIS AND TREATMENT                 [facility]

 

                CBC WITH DIFF   2023 04:18:00 Jagjit Leo [facility]

 

                LIPASE          2023 03:15:00 Tara [facility]

 

                COMP. METABOLIC PANEL 2023 03:15:00 Jagjit Leo [facility]



                [id number]

 

                CONSENT/REFUSAL FOR 2023 02:11:48 Doctor Unassigned, No [facility]



                DIAGNOSIS AND TREATMENT                 [facility]

 

                NOTICE OF PRIVACY 2023 02:11:16 Doctor Unassigned, No [facility]

 

                MEDICATION CORRESPONDENCE 2023 05:01:00 Doctor Unassigned,

 No [facility]

 

                US GALL BLADDER 2023 03:41:51 Markell Kauffman [facility]

 

                LIPASE          2023 02:18:00 Markell Kauffman [facility]

 

                COMP. METABOLIC PANEL 2023 02:18:00 Markell Kauffman [facility]



                ([id number])                                         [facility]

 

                CBC WITH DIFF   2023 02:18:00 Markell Kauffman [facility]

 

                URINALYSIS      2023 02:18:00 Markell Kauffman [facility]

 

                CONSENT/REFUSAL FOR 2023 01:19:39 Doctor Unassigned, No Un

[facility] 

[place]



                DIAGNOSIS AND TREATMENT                 Name            [facility] 

Branch

 

                TDAP VACCINE, >11 YRS, IM 2023 20:36:37 Martín Elder    Un

iversity of [facility]

 

                DUPLEX VENOUS LEG LEFT - 2023-04-10 02:06:00 Lubna Pimentel Un

[facility] 

[place]



                BY VASCULAR LAB                                 [facility]

 

                XR TIBIA FIBULA 2 VW LEFT 2023-04-10 00:14:14 [facility]imentel [facility]

 

                CONSENT/REFUSAL FOR 2023 23:09:27 Doctor Unassigned, No Un

[facility] 

[place]



                DIAGNOSIS AND TREATMENT                 Name            [facility]

 

                NOTICE OF PRIVACY 2023 02:22:52 Doctor Unassigned, No Univ

[facility] [place]



                PRACTICES                       Name            [facility]

 

                CONSENT/REFUSAL FOR 2023 02:22:17 Doctor Unassigned, No Un

[facility] 

[place]



                DIAGNOSIS AND TREATMENT                 Name            [facility]

 

                POCT GLUCOSE (AUTOMATED) 2023 06:47:00 Sourav Sanchez  Uni

versity of [facility]

 

                POCT GLUCOSE (AUTOMATED) 2023 02:44:00 Sourav Sanchez  Uni

[facility]

 

                CREATINE KINASE 2023 00:36:00 Tracy Rudd [facility]

 

                POCT GLUCOSE (AUTOMATED) 2023 00:35:00 Sourav Sanchez  Uni

[facility]

 

                POCT GLUCOSE (AUTOMATED) 2023-02-10 14:11:00 Sourav Sanchez  Uni

[facility]

 

                POCT GLUCOSE (AUTOMATED) 2023-02-10 04:50:00 Sourav Sanchez  Uni

[facility] of [facility]

 

                POCT GLUCOSE (AUTOMATED) 2023-02-10 01:29:00 Sourav Sanchez  Uni

[facility] of [facility]

 

                POCT GLUCOSE (AUTOMATED) 2023-02-10 00:32:00 Sourav Sanchez  Uni

vers[facility] of [facility]

 

                POCT GLUCOSE(AGE >30DAYS) 2023 15:31:00 Tracy Rudd

 [facility]

 

                POCT GLUCOSE (AUTOMATED) 2023 15:29:00 Yonis Joya  [facility]

 

                POCT GLUCOSE (AUTOMATED) 2023 23:56:00 Yonis Joya  [facility]

 

                COVID-19 (MOLECULAR 2023 23:48:00 [facility]



                                        [facility] 



                                                            [facility]



                 NUCLEIC ACID                                   



                AMPLIFICATION)                                  

 

                LAB ONLY COVID  2023 [facility]48:00 [facility]

 

                CREATINE KINASE 2023 15:50:00 Toan [facility]

 

                THYROID STIMULATING 2023 [facility]:50[facility]00 [facility]



                HORMONE                                         [facility]

 

                COMP. METABOLIC PANEL 2023 15:50:00 Yonis Joya  [facility]



                ([id number])                                         [facility]

 

                SALICYLATE      2023 15:50:00 [facility]

 

                ETHANOL         2023 15:50:00 Toan [facility]

 

                CBC WITH DIFF   2023 15:50:00 Toan [facility]

 

                URINALYSIS      2023 15:50:00 [facility]

 

                COVID-19 (ID NOW RAPID 2023 15:50:00 Yonis Joya  [facility]

[facility] [place]



                TESTING)                                        [facility]

 

                LAB ONLY COVID  2023 15:50:00 Yonis Joya  [facility]

 

                URINE DRUG (IMMUNOASSAY) 2023 15:50:00 Yonis Joya  [facility]i

[facility] [place]



                - COMPREHENSIVE DRUG                                 [facility]



                SCREEN W/O REFLEX                                 

 

                POCT GLUCOSE (AUTOMATED) 2023 14:54:00 Yonis Joya  [facility]

 

                CONSENT/REFUSAL FOR 2023 14:49:26 Doctor Unassigned, No [facility]

[facility]



                DIAGNOSIS AND TREATMENT                 Name            [facility]

 

                POCT HEMOGLOBIN A1C TEST 2023 20:00:00 Chastity Solorzano [facility]

[facility]

 

                URINALYSIS      2022 14:51:00 PERFECTO Mchugh [facility]

 

                LIPASE          2022 14:50:00 PERFECTO Mchugh Dina [facility]

 

                MAGNESIUM       2022 14:50:00 PERFECTO Mchugh Dina [facility]

 

                TROPONIN I      2022 14:50:00 PERFECTO Mchugh Dina [facility]

 

                COMP. METABOLIC PANEL 2022 14:50:00 PERFECTO Mchugh [facility]

[facility] [place]



                ([id number])                                         [facility]

 

                LIPID PANEL ([id number])(TOTAL 2022 14:50:00 PERFECTO Mchugh Un

[facility]



                CHOLESTEROL,                                    [facility]



                TRIGLYCERIDES, HDL)                                 

 

                CBC WITH DIFF   2022 14:50:00 PERFECTO Mchugh Dina [facility]

 

                CONSENT/REFUSAL FOR 2022 13:59:42 Doctor Unassigned, No [facility]

[facility]



                DIAGNOSIS AND TREATMENT                 [facility]

 

                SARS-COV-2 COVID-19 2022-10-28 19:49:13 Jagjit Claire [facility]

[facility] [place]



                CLAUDIA-SUCROSE VACCINE [address] [place] [address]



                YRS+, BIVALENT 0.3ML, IM,                                 



                (PFIZER GRAY TOP BOOSTER)                                 

 

                FLU VACC ([id number]), 6 2022-10-28 17:38:27 Matthew Long [facility]



                MO-64 YRS, .5ML, IM, QUAD                                 Medica

l Branch



                (FLUCELVAX)                                     

 

                POCT HEMOGLOBIN A1C TEST 2022-10-28 17:36:00 Matthew Long 

[facility]

 

                MEDICATION CORRESPONDENCE 2022 05:01:00 Doctor Unassigned,

 No [facility]

 

                MEDICATION CORRESPONDENCE 2022 05:01:00 Doctor Unassigned,

 No [facility]







Encounters







        Start   End     Encounter Admission Attending Care    Care    Encounter 

Source



        Date/Time Date/Time Type    Type    Clinicians Facility Department ID   

   

 

        2023-02-10         Outpatient                 [facility]     [id number]

 [place]



        10:18:38                                                 [id number] [facility]

 

        2023         Outpatient                 [facility]     [id number]

 [place]



        15:15:45                                                 [id number] [facility]

 

        2021         Outpatient VALENTE SIMON [facility]    GIALEXANDER     [id number]

 Univers



        08:38:19                         EAMON                           itderek [facility]

 

        2021-10-31         Outpatient VALENTE SIMON [facility]    GIALEXANDER     [id number]

 Univers



        16:04:49                         EAMON                           ity [facility]

 

        2023 Emergency X       ZBIGNIEW   [facility]    ERT     [id number]

[id number] Univers



        16:27:00 19:12:00                 SHIRA                         karlenederek 

[facility]

 

        2023 [facility]         [facility]    [id number]

[id number] Univers



        16:27:00 19:12:00                 Shira JACKSON [id number]        

 ity of



                                                [place] [id number]         [facility]  [id number]         [address]

 

        2023 Telephone         [facility]    [id number]

[id number] Univers



        00:00:00 00:00:00                 Peter   PRIMARY [id number]         it

y of



                                                CARE    [id number]         [facility] [phone]         Me

dical



                                                        [address]             [place]

 

        2023 Emergency [facility]       [facility]    [facility]     [id number]

[id number] Univers



        19:56:00 22:52:00                 TRACY joshua [facility]

 

        2023 Emergency         [facility]    1.2.840.114 10

[id number] Univers



        19:56:00 22:52:00                 Tracy JACKSON 350.1.13.10         

ity of



                                                [place] 4.2.7.2.686         [facility]  [phone]         [address]

 

        2023 Technician         Pcp-Lab [facility]    1.2.840.114 105

[id number] Univers



        14:45:00 15:00:00 Visit           Pathology PRIMARY 350.1.13.10         

ity of



                                                CARE    4.2.7.2.686         [place]

[place]



                                                [place] [phone]         Me

dical



                                                        366             Branch

 

        2023 Outpatient R       PATHOLOGY [facility]    74541

[id number] Univers



        14:45:00 14:45:00                                                 ity of



                                                                        [facility]

 

        2023 Orders          Doctor  DIAN    1.2.840.114 [id number]

561 Univers



        00:00:00 00:00:00 Only            Unassigned, GARRETT   350.1.13.10       

  ity of



                                        [facility] 4.2.7.2.686         Vinay

as



                                                        [phone]         Medi

luis alfredo



                                                        009             Branch

 

        2023 Outpatient R       RICKY    [facility]    [id number]

388 Univers



        16:20:00 16:36:21                 BUTCH                         ity of



                                                                        [facility]

 

        2023 Office          Chastity Solorzano [facility]    1.2.840.1

14 [id number] 

Univers



        16:20:00 16:36:21 Visit           Butch Ruiz 350.1.13.10      

   ity of



                                                CARE    4.2.7.2.686         [place]

[place]



                                                [place] [phone]         Me

dical



                                                        389             Branch

 

        2023 Emergency X       MEG [facility]    ERT     [id number]

2224 Univers



        17:47:00 20:34:00                 ANDRIY joshua [facility]

 

        2023 Emergency         [facility]    1.2.840.114 10

[id number] Univers



        17:47:00 20:34:00                 Andriy JACKSON 350.1.13.10         i

ty [facility] 4.2.7.2.686         [place]

[place]



                                                [place]  [phone]         Medi

luis alfredo



                                                        084             Branch

 

        2023 Emergency X       LETICIA  [facility]    ERT     [id number]

54 Univers



        13:12:00 15:39:00                 LORENE joshua [facility]

 

        2023 Prabha Guerrero  [facility]    1.2.772.201 8202

14031 Univers



        13:12:00 15:39:00                 Lorene JACKSON 350.1.13.10       

  ity of



                                                [facility] 4.2.7.2.686         [facility]  [phone]         Medi

luis alfredo



                                                        084             Branch

 

        2023 Telephone         [facility]    1.2.840.114 1

[id number] Univers



        00:00:00 00:00:00                 Peter   PRIMARY 350.1.13.10         it

y of



                                                CARE    4.2.7.2.686         [place]

[place]



                                                [place] [phone]         Me

dical



                                                        389             Branch

 

        2023 Telephone         [facility]    1.2.840.114 1

[id number] Univers



        00:00:00 00:00:00                 Peter   PRIMARY 350.1.13.10         it

y of



                                                CARE    4.2.7.2.686         [place]

[place]



                                                [place] [phone]         Me

dical



                                                        389             [place]

 

        2023 Emergency X       SINGER, [facility]    ERT     [id number]

01 Univers



        15:47:00 16:24:00                 OLAF joshua [facility]

 

        2023 Emergency         [facility]    1.2.963.914 0712

[id number] Univers



        15:47:00 16:24:00                 Olaf JACKSON 350.1.13.10         i

ty of



                                                [facility] 4.2.7.2.686         [facility]  [phone]         [address]

 

        2023 Patient         Tomy [facility]    1.2.165.714 4037

27306 Univers



        00:00:00 00:00:00 Outreach         Linda M PRIMARY 350.1.13.10        

 ity of



                                                CARE    4.2.7.2.686         [place]

[place]



                                                [place] [phone]         Me

dical



                                                        389             Branch

 

        2023 Telephone         [facility]    1.2.840.114 1

[id number] Univers



        00:00:00 00:00:00                 Peter   PRIMARY 350.1.13.10         it

y of



                                                CARE    4.2.7.2.686         [place]

[place]



                                                [place] [phone]         Me

dical



                                                        389             Branch

 

        2023 Emergency X       [facility]Formerly [facility]    ERT     [id number]

34 Univers



        21:36:00 00:02:00                 CHRISTOPHER                         it

y of



                                                                        [facility]

 

        2023 Emergency         [facility]    1.2.078.588 9061

69841 Univers



        21:36:00 00:02:00                 Jagjit JACKSON 350.1.13.10      

   ity of



                                                [facility] 4.2.7.2.686         [place]

[place]



                                                CAMPUS  [phone]         Medi

luis alfredo



                                                        084             Branch

 

        2023 Refill          [facility]    1.2.840.114 103

[id number] Univers



        00:00:00 00:00:00                 Peter   PRIMARY 350.1.13.10         it

y of



                                                CARE    4.2.7.2.686         [place]

[place]



                                                [place] [phone]         Me

dical



                                                        389             Branch

 

        2023 Patient         [facility]    1.2.840.114 103

[id number] Univers



        00:00:00 00:00:00 Secure Msg         Peter   PRIMARY 350.1.13.10        

 ity of



                                                CARE    4.2.7.2.686         [place]

[place]



                                                [place] [phone]         Me

dical



                                                        389             Branch

 

        2023-05-15 2023-05-15 Telephone         [facility]    1.2.078.527 4737

[id number] Univers



        00:00:00 00:00:00                 Martín   PRIMARY 350.1.13.10         it

y of



                                                CARE    4.2.7.2.686         [place]

[place]



                                                [place] [phone]         Me

dical



                                                        389             Branch

 

        2023 Orders          Doctor  DIAN    1.2.840.114 [id number]

630 Univers



        00:00:00 00:00:00 Only            Unassigned, GARRETT   350.1.13.10       

  ity of



                                        [facility] 4.2.7.2.686         [place]

as



                                                        [phone]         Medi

luis alfredo



                                                        009             Branch

 

        2023 Telephone         Sridhar [facility]    1.2.948.645 1379

21402 Univers



        00:00:00 00:00:00                 Dung SPECIALTY 350.1.13.10         

ity of



                                                CARE    4.2.7.2.686         [place]

[place]



                                                [place] .[id number]         Me

dical



                                                CRISTY 072             [facility]                   

 

        2023 Telephone         Jerrod  [facility]    1.2.465.888 2227

64614 Univers



        00:00:00 00:00:00                 Martín   PRIMARY 350.1.13.10         it

y of



                                                CARE    4.2.7.2.686         [place]

[place]



                                                [place] [phone]         Me

dical



                                                        389             [place]

 

        2023 Refill          Jerrod  [facility]    1.2.840.114 [id number]

145 Univers



        00:00:00 00:00:00                 Martín   PRIMARY 350.1.13.10         it

y of



                                                CARE    4.2.7.2.686         [place]

[place]



                                                [place] [phone]         Me

dical



                                                        389             [place]

 

        2023 Emergency X       [facility]     [id number]

2526 Univers



        20:43:00 23:30:00                 MARKELL                            ity of



                                                                        [facility]

 

        2023 Emergency         [facility]    1.2.840.114 10

[id number] Univers



        20:43:00 23:30:00                 Markell B  RENETTA 350.1.13.10         i

ty of



                                                [place] 4.2.7.2.686         [place]

[place]



                                                [place]  [phone]         Medi

luis alfredo



                                                        084             [place]

 

        2023 Orders          Doctor  DIAN    1.2.840.114 [id number]

373 Univers



        00:00:00 00:00:00 Only            Unassigned, GARRETT   350.1.13.10       

  ity of



                                        [facility] 4.2.7.2.686         [place]

as



                                                        [phone]         Medi

luis alfredo



                                                        009             Branch

 

        2023 Technician         Pcp-Lab [facility]    1.2.840.114 102

[id number] Univers



        16:00:00 16:15:00 Visit           Pathology PRIMARY 350.1.13.10         

ity of



                                                CARE    4.2.7.2.686         [place]

[place]



                                                [place] [phone]         Me

dical



                                                        366             Branch

 

        2023 Office          Martín Elder [facility]    1.2.840.114 1

[id number] Univers



        15:10:00 15:40:00 Visit           Joseph Leal PRIMARY 350.1.13.10 

        ity of



                                                CARE    4.2.7.2.686         [place]

[place]



                                                [place] [phone]         Me

dical



                                                        389             Branch

 

        2023 Outpatient R       JOSEPH LEAL [facility]    1044

[id number] Univers



        15:10:00 15:10:00                                                 ity of



                                                                        [facility]

 

        2023 Emergency X       [facility]    ERT     [id number]

86 Univers



        18:17:00 22:30:00                 LUBNA                          ity of



                                                                        [facility]

 

        2023 Emergency         [facility]    1.2.605.138 0005

[id number] Univers



        18:17:00 22:30:00                 Lubna JACKSON 350.1.13.10         

ity of



                                                [facility] 4.2.7.2.686         [place]

[place]



                                                CAMPUS  [phone]         [address]

 

        2023 Outpatient R       JOSEPH LEAL [facility]    1044

[id number] Univers



        14:20:00 14:20:00                                                 ity of



                                                                        [facility]

 

        2023-03-15 2023-03-15 Emergency X       [facility]Formerly [facility]    ERT     [id number]

20 Univers



        21:29:00 22:01:00                 CHRISTSAMER                         it

y of



                                                                        [facility]

 

        2023-03-15 2023-03-15 Emergency         [facility]    1.2.593.969 5963

02091 Univers



        21:29:00 22:01:00                 Jagjit JACKSON 350.1.13.10      

   ity of



                                                [facility] 4.2.7.2.686         [place]

[place]



                                                CAMPUS  [phone]         [address]

 

        [facility]    1.2.840.114 [id number]

284 Univers



        00:00:00 00:00:00                 Eamon   PRIMARY 350.1.13.10         it

y of



                                                CARE    4.2.7.2.686         [place]

[place]



                                                PA[place]ILL[place] [phone]         Me

dical



                                                        389             Branch

 

        2023 [facility] 1.2.741.099 [id number]

[id number] Univers



        10:58:00 23:59:00 Encounter         Brian RAUL HARPER 350.1.13.10       

  ity of



                                                SE      4.2.7.2.686         [place]

[place]



                                                        [phone]         Medi

luis alfredo



                                                        043             Branch

 

        2023 Emergency X       SCHOENSTE[facility]    ERT     1043

[id number] Univers



        08:59:00 09:39:00                 , XU                         ity [facility]

 

        2023 Emergency         Yonis Joya [facility]    1.2.840.1

14 [id number] 

Univers



        08:59:00 09:39:00                 Sourav Sanchez 350.1.13.10   

      ity of



                                        Schoenstein, Xu SHER 4.2.7.2.686  

       [facility]  [phone]         Medi

luis alfredo



                                                        084             Branch

 

        2023 Outpatient R       JORDAN  [facility]    NUT     [id number]

441 Univers



        00:00:00 00:00:00                 BRIAN                         derek [facility]

 

        2023 Outpatient R       ARMAAN CHERRY [facility]    13621

93619 Univers



        13:30:00 13:54:34                                                 ity [facility]

 

        2023 Office          Chastity Solorzano [facility]    1.2.840.1

14 [id number] Univers



        13:30:00 13:54:34 Visit           Armaan Cherry 350.1.13.1

0         ity of



                                                CARE    4.2.7.2.686         [place]

[place]



                                                PA[place] [phone]         Me

dical



                                                        389             [place]

 

        [facility]23 [facility]    1.2.840.114 99

[id number] Univers



        00:00:00 00:00:00                 Mika PRIMARY 350.1.13.10         

ity of



                                                CARE    4.2.7.2.686         [place]

[place]



                                                [place] [phone]         Me

dical



                                                        389             [place]

 

        2023 [facility]    1.2.840.114 99

[id number] Univers



        00:00:00 00:00:00                 Mika PRIMARY 350.1.13.10         

ity of



                                                CARE    4.2.7.2.686         [place]

[place]



                                                PA[place]ILLION [phone]         Me

dical



                                                        389             Branch

 

        2023 Refill          Charly [facility]    1.2.840.114 99

[id number] Univers



        00:00:00 00:00:00                 Tita washington PRIMARY 350.1.13.10        

 ity of



                                                CARE    4.2.7.2.686         [place]

[place]



                                                PA[place]ION [phone]         Me

dical



                                                        389             Branch

 

        2022 Emergency X       RITU, K [facility]    ERT     [id number]

6432 Univers



        08:26:00 11:38:00                                                 ity of



                                                                        [facility]

 

        2022 Emergency         PERFECTO Mchguh [facility]    1.2.840.114 98

[id number] Univers



        08:26:00 11:38:00                 Dina JACKSON 350.1.13.10         i

ty of



                                                [place] 4.2.7.2.686         [place]

[place]



                                                [place]  [phone]         Medi

luis alfredo



                                                        084             Branch

 

        2022 Telephone         [facility]   [facility]    1.2.601.662 6615

0266 Univers



        00:00:00 00:00:00                 Kimberly LUNDBERG 350.1.13.10       

  ity of



                                                IALTY   4.2.7.2.686         [place]

[place]



                                                [place]  [phone]         Medi

luis alfredo



                                                AND Dewy Rose 389             Branch



                                                DIABETES                 



                                                CLINIC                  

 

        2022 Izaiah Huitron [facility]    1.2.040.702 6083

3919 Univers



        00:00:00 00:00:00 Management         Linda M PRIMARY 350.1.13.10      

   ity of



                                                CARE    4.2.7.2.686         [place]

[place]



                                                PAVILLION [phone]         Me

dical



                                                        389             Branch

 

        2022-10-28 2022-10-28 Outpatient R       ALETHEA [facility]    [id number]

[id number] Univers



        13:30:00 15:03:08                 JAGJIT soler

y of



                                                                        [facility]

 

        2022-10-28 2022-10-28 Office          Kimberly Gorman [facility]    1.2.840.11

4 [id number] Univers



        13:30:00 15:03:08 Visit           Unknown, Attending PRIMARY 350.1.13.10

         ity of



                                        Jagjit Cliare CARE    4.2.7.2.686 

        [place]



                                                PAVILLION [phone]         Me

dical



                                                        389             Branch

 

        2022-10-28 2022-10-28 Technician         Pcp-Lab [facility]    1.2.840.114 [id number]

[id number] Univers



        14:45:00 15:00:00 Visit           Jagjit Claire PRIMARY 350.1.13.1

0         ity of



                                                CARE    4.2.7.2.686         [place]

[place]



                                                [place] [phone]         Me

dical



                                                        366             Branch

 

        2022-10-28 2022-10-28 Outpatient R       ALETHEA [facility]    [id number]

976 Univers



        13:30:00 13:30:00                 CHRISTOPHER                         it

y of



                                                                        [facility]

 

        2022 Orders          Doctor [facility]    1.2.840.114 [id number]

79 Univers



        00:00:00 00:00:00 Only            Unassigned, GARRETT   350.1.13.10       

  ity of



                                        [facility] 4.2.7.2.686         [place]in[place]

as



                                                        [phone]         Medi

luis alfredo



                                                        009             Branch

 

        2022 Telephone         Sridhar [facility]    1.2.666.885 [id number]

4443 Univers



        00:00:00 00:00:00                 Dung SPECIALTY 350.1.13.10         

ity of



                                                CARE    4.2.7.2.686         [place]

[place]



                                                CENTER .[id number]         Me

dical



                                                VICTORY 072             [facility]                   

 

        2022 Refill          [facility]    1.2.840.114 95

[id number] Univers



        00:00:00 00:00:00                 an, Tita PRIMARY 350.1.13.10        

 ity of



                                                CARE    4.2.7.2.686         [place]

[place]



                                                [place] [phone]         Me

dical



                                                        389             Branch

 

        2022 Orders          Doctor [facility]    1.2.840.114 [id number]

58 Univers



        00:00:00 00:00:00 Only            Unassigned, GARRETT   350.1.13.10       

  ity of



                                        [facility] 4.2.7.2.686         [place]

as



                                                        [phone]         Medi

luis alfredo



                                                        009             Branch

 

        2022 Office          Dung Waller [facility]    1.2.840.11

4 [id number] Univers



        14:30:00 15:00:00 Visit           ErwinBill campos SPECIALTY 350.1.13

.10         ity of



                                                CARE    4.2.7.2.686         [place]

[place]



                                                CENTER .[id number]         Me

dical



                                                VICTORY 072             [facility]                   

 

        2022 Outpatient R       HUSSAIN [facility]    [id number]

403 Univers



        14:30:00 14:30:00                 BILL joshua [facility]

 

        2022 Telephone         [facility]    1.2.840.114 

[id number] Univers



        00:00:00 00:00:00                 Tita washington PRIMARY 350.1.13.10        

 ity of



                                                CARE    4.2.7.2.686         [place]

[place]



                                                [place]ILL[place] [phone]         Me

dical



                                                        389             [place]

 

        2022 Patient         [facility]    1.2.840.114 [id number]

[id number] Univers



        00:00:00 00:00:00 Secure Msg         felixTita PRIMARY 350.1.13.10     

    ity of



                                                CARE    4.2.7.2.686         [place]

[place]



                                                PAVILLION [phone]         Me

dical



                                                        389             [place]

 

        2022 Office          Tita Pruitt [facility]    1.2.

840.114 [id number] 

Univers



        15:40:00 16:41:31 Visit           Unknown, Attending PRIMARY 350.1.13.10

         ity of



                                        Mika Jaramillo CARE    4.2.7.2.68

6         [place]



                                                PAVILLION [phone]         Me

dical



                                                        389             [place]

 

        2022 Outpatient R       CLINT [facility]    103

[id number] Univers



        15:40:00 16:41:31                 MIKA joshua 

[facility]

 

        2022 Outpatient R       [facility]    103

[id number] Univers



        15:40:00 16:41:31                 MIKA joshua 

[facility]

 

        2022 Technician         Pcp-Lab [facility]    1.2.840.114 [id number]

13792 Univers



        16:15:00 16:30:00 Visit           Unknown, Attending PRIMARY 350.1.13.10

         ity of



                                                CARE    4.2.7.2.686         [place]

[place]



                                                PAVILLION [phone]         Me

dical



                                                        366             [place]

 

        2022 Outpatient R       UNKNOWN, [facility]    [id number]

0546 Univers



        16:15:00 16:15:00                 ATTENDING                         ity 

[facility]

 

        2022 Patient         Doctor  [facility]    1.2.840.114 [id number]

97 Univers



        00:00:00 00:00:00 Secure Msg         Unassigned, MULTISPEC 350.1.13.10  

       ity of



                                        No Name IAANDREW   4.2.7.2.686         [place]

[place]



                                                CENTER  [phone]         Medi

luis alrfedo



                                                AND Dewy Rose [address]             [place]



                                                DIABETES                 



                                                CLINIC                  

 

        2022 Refill          [facility]    1.2.840.114 92

[id number] Univers



        00:00:00 00:00:00                 an, Tita PRIMARY 350.1.13.10        

 ity of



                                                CARE    4.2.7.2.686         [place]

[place]



                                                PAVILLION [phone]         Me

dical



                                                        389             [place]

 

        2022 Technician         Malia, Natalee Lab Main [facility]    1.2.8

40.114 [id number] 

Univers



        10:15:00 10:30:00 Visit           Mika Jaramillo 350.1.1

3.10         ity of



                                                CHRISTOS 4.2.7.2.686         [place]

[place]



                                                PROFESSIO [phone]         Me

dical



                                                NAL     353             [facility]                 

 

        2022 Outpatient R       [facility]    103

[id number] Univers



        10:15:00 10:15:00                 MIKA joshua 

[facility]

 

        2022 [facility]         [facility]    1.2.840.114 92

[id number] Univers



        00:00:00 00:00:00 Secure Msg         an, Tita PRIMARY 350.1.13.10     

    ity of



                                                CARE    4.2.7.2.686         [place]

[place]



                                                PAVILLION [phone]         Me

dical



                                                        389             [place]

 

        2022 Refill          [facility]    1.2.840.114 92

[id number] Univers



        00:00:00 00:00:00                 an, Tita PRIMARY 350.1.13.10        

 ity of



                                                CARE    4.2.7.2.686         [place]

[place]



                                                PAVILLION [phone]         Me

dical



                                                        389             [place]

 

        2022 Refill          [facility]    1.2.840.114 92

[id number] Univers



        00:00:00 00:00:00                 an, Tita PRIMARY 350.1.13.10        

 ity of



                                                CARE    4.2.7.2.686         [place]

[place]



                                                PAVILLION [phone]         Me

dical



                                                        389             [place]

 

        2022 Refill          [facility]    1.2.840.114 92

[id number] Univers



        00:00:00 00:00:00                 an, Tita PRIMARY 350.1.13.10        

 ity of



                                                CARE    4.2.7.2.686         [place]

[place]



                                                PAVILLION [phone]         Me

dical



                                                        389             [place]

 

        2022 Refill          Artbrandon [facility]    1.2.840.114 [id number]

238 Univers



        00:00:00 00:00:00                 [facility]  350.1.13.10         it

y of



                                        Edward  [place] 4.2.7.2.686         [place]

as



                                                PROFESSIO [phone]         Me

dical



                                                NAL     044             [place]



                                                OFFICE                  



                                                BUILDING                 



                                                ONE                     

 

        2022-02-15 2022-02-15 Refdiaz          Artrohini [facility]    1.2.840.114 [id number]

132 Univers



        00:00:00 00:00:00                 Kary   HEALTH  350.1.13.10         it

y of



                                        Edward  [place] 4.2.7.2.686         [place]

as



                                                PROFESSIO [phone]         [address]                     

 

        2022 Outpatient R       UNKNOWN, [facility]    [id number]

0610 Univers



        14:30:00 15:20:49                 ATTENDING                         ity 

[facility]

 

        2022 Office          Tita Pruitt [facility]    1.2.

840.114 [id number] 

Univers



        14:30:00 15:20:49 Visit           Unknown, Attending PRIMARY 350.1.13.10

         ity 



                                        HedygermánntdomitilaMika CARE    4.2.7.2.68

6         [place]



                                                PAVILLION [phone]         [address]

 

        2022 Outpatient R       [facility]    103

[id number] Univers



        14:30:00 15:20:49                 MIKA                         [facility]

 

        [facility]          [facility]    1.2.840.114 [id number]

003 Univers



        00:00:00 00:00:00                 [facility]  350.1.13.10         it

y of



                                        Abbe  [place] 4.2.7.2.686         [place]

as



                                                PROFESSIO [phone]         [address]                     

 

        [facility]    1.2.840.114 [id number]

02 Univers



        00:00:00 00:00:00                 Eamon   PRIMARY 350.1.13.10         it

y of



                                                CARE    4.2.7.2.686         [place]

[place]



                                                PAVILLION [phone]         [address]

 

        2022 [facility]    1.2.840.114 [id number]

05 Univers



        00:00:00 00:00:00                 Eamon   PRIMARY 350.1.13.10         it

y of



                                                CARE    4.2.7.2.686         [place]

[place]



                                                PAVILLION [phone]         [address]

 

        2022 [facility]    1.2.840.114 [id number]

94 Univers



        00:00:00 00:00:00                 Eamon   PRIMARY 350.1.13.10         it

y of



                                                CARE    4.2.7.2.686         [place]

[place]



                                                PAVILLION [phone]         Me

dical



                                                        389             [place]

 

        2021 Telephone         Charly [facility]    1.2.840.114 

[id number] Univers



        00:00:00 00:00:00                 an, Tita PRIMARY 350.1.13.10        

 ity of



                                                CARE    4.2.7.2.686         [place]

[place]



                                                PAVILLION [phone]         Me

dical



                                                        389             [place]

 

        2021 Orders          Doctor  DIAN    1.2.840.114 [id number]

08 Univers



        00:00:00 00:00:00 Only            Unassigned, GARRETT   350.1.13.10       

  ity of



                                        [facility] 4.2.7.2.686         [place]

as



                                                        [phone]         [address]

 

        2021 Emergency X       CORCORAN, [facility]    ERT     [id number]

631 Univers



        19:08:00 23:32:00                 MARY BETH                         ity [facility]

 

        2021 Emergency         [place], [facility]    1.2.840.114 [id number]

[id number] Univers



        19:08:00 23:32:00                 Mary Beth JACKSON 350.1.13.10         i

ty of



                                                [place] 4.2.7.2.686         [place]

[place]



                                                CAMPUS  [phone]         Medi

luis alfredo



                                                        084             [place]

 

        2021 Emergency X       CORCORAN, [facility]    ERT     [id number]

631 Univers



        19:08:00 23:32:00                 MARY BETH                         ity of



                                                                        [facility]

 

        2021 Orders          Doctor  DIAN    1.2.840.114 [id number]

22 Univers



        00:00:00 00:00:00 Only            Unassigned, GARRETT   350.1.13.10       

  ity of



                                        [facility] 4.2.7.2.686         [place]

as



                                                        [phone]         [address]

 

        2021 DIAN Reyes    1.2.432.299 [id number]

7879 Univers



        00:00:00 00:00:00                 Carina GARRETT   350.1.13.10         it

y of



                                                HOSPITAL 4.2.7.2.686         [place]

as



                                                        [phone]         Medi

luis alfredo



                                                        044             [place]

 

        2021 Salty          Carlos A [facility]    1.2.840.114 [id number]

510 Univers



        00:00:00 00:00:00                 [facility]  350.1.13.10         it

y of



                                        [place]  [place] 4.2.7.2.686         [place]

as



                                                PROFESSIO [phone]         [facility]     044             [place]



                                                OFFICE                  



                                                BUILDING                 



                                                ONE                     

 

        2021 [facility]ill          [facility]    1.2.840.114 [id number]

09 Univers



        00:00:00 00:00:00                 Edward  PRIMARY 350.1.13.10         it

y of



                                                CARE    4.2.7.2.686         [place]

[place]



                                                PAVILLION [phone]         [address]

 

        2021 Outpatient R       [facility]    [id number]

553 Univers



        13:30:00 13:30:00                 EAMON                           ity [facility]

 

        2021 Office          [facility]    1.2.840.114 [id number]

80 Univers



        13:30:00 13:30:00 Visit           Eamon   SPECIALTY 350.1.13.10         

ity of



                                                CARE    4.2.7.2.686         [place]

[place]



                                                CENTER .[id number]         Me

arturocarlos MUNIZ [address] [place] [address]                   

 

        2021 Outpatient R       [facility]    [id number]

553 Univers



        13:30:00 13:28:21                 EAMON                           ity [facility]

 

        2021 Outpatient R       [facility]    [id number]

553 Univers



        13:30:00 13:28:21                 EAMON                           ity [facility]

 

        2021 Telephone         [facility]    1.2.222.841 [id number]

3099 Univers



        00:00:00 00:00:00                 Eaomn   SPECIALTY 350.1.13.10         

ity of



                                                CARE    4.2.7.2.686         [place]

[place]



                                                CENTER .[id number]         Me

arturoal



                                                MI[address]                   

 

        2021 Outpatient R       ALFRED [facility]    [id number]

058 Univers



        08:49:34 23:59:00                 EAMON                           ity [facility]

 

        2021 Outpatient R       ALFRED [facility]    [id number]

058 Univers



        08:49:34 23:59:00                 EAMON                           ity [facility]

 

        2021 [facility]         [facility] 1.2.840.114 [id number]

[id number] Univers



        08:30:00 23:59:00 Encounter         Eamon   Y HEALTH 350.1.13.10        

 ity of



                                                CLINICS 4.2.7.2.686         [place]

[place]



                                                        [phone]         Medi

luis alfredo



                                                        804             [place]

 

        [facility]    1.2.840.114 [id number]

406 Univers



        00:00:00 00:00:00                 [facility]  350.1.13.10         it

y of



                                        Abbe  [place] 4.2.7.2.686         [place]

as



                                                PROFESSIO [phone]         Me

denisha MUNOZ     044             [place]



                                                OFFICE                  



                                                BUILDING                 



                                                ONE                     

 

        2021-10-27 2021-10-27 [facility] R       [facility]    [id number]

775 Univers



        00:00:00 00:00:00                 EAMON                           ity [facility]

 

        2021-10-27 2021-10-27 [facility] R       [facility]    [id number]

775 Univers



        00:00:00 00:00:00                 EAMON                           ity [facility]

 

        2021-10-26 2021-10-26 Telephone         [facility]    1.2.746.057 [id number]

8695 Univers



        00:00:00 00:00:00                 Eamon   SPECIALTY 350.1.13.10         

ity of



                                                CARE    4.2.7.2.686         [place]

[place]



                                                CENTER .[id number]         Me

denisha MUNIZ 072             [place]



                                                [place]                   

 

        2021-10-25 2021-10-25 Imm/Inj         Nurse, Pcp Immunization [facility]    1.

2.840.114 [id number]

                                        Univers



        09:12:43 09:22:43 Visit           Shamar Gardiner PRIMARY 350.1.13.

10         ity of



                                                CARE    4.2.7.2.686         [place]

[place]



                                                ARNEL [phone]         Me

dical



                                                        421             [place]

 

        2021-10-25 2021-10-25 Outpatient VALENTE GARDINER  [facility]    [id number]

871 Univers



        09:00:00 09:00:00                 [facility]

 

        2021-10-25 2021-10-25 Outpatient VALENTE GARDINER  [facility]    [id number]

871 Univers



        09:00:00 09:00:00                 [facility]

 

        2021-10-20 2021-10-20 Refdiaz Strauss [facility]    1.2.840.114 [id number]

248 Univers



        00:00:00 00:00:00                 [facility]  350.1.13.10         it

y of



                                        [place] 4.2.7.2.686         [place]

as



                                                Professio [phone]         Me

dical



                                                nal     [address] [place] [address]



                                                Office                  



                                                Building                 



                                                One                     

 

        2021-10-20 2021-10-20 [facility]    1.2.840.114 [id number]

46 Univers



        00:00:00 00:00:00                 Edward  PRIMARY 350.1.13.10         it

y of



                                                CARE    4.2.7.2.686         [place]

[place]



                                                PAVILLION [phone]         Me

dical



                                                        389             [place]

 

        2021-10-20 2021-10-20 Patient         Doctor  [facility]    1.2.840.114 [id number]

06 Univers



        00:00:00 00:00:00 Secure Msg         Unassigned, MULTISPEC 350.1.13.10  

       ity of



                                        No Name IALTY   4.2.7.2.686         [place]

[place]



                                                CENTER  [phone]         Medi

Jai 389             [place]



                                                DIABETES                 



                                                CLINIC                  

 

        2021-10-04 2021-10-04 Letter          [facility]    1.2.840.114 [id number]

101 Univers



        00:00:00 00:00:00 (Out)           Erendira F   PRIMARY 350.1.13.10         it

y of



                                                CARE    4.2.7.2.686         [place]

[place]



                                                PAVILLION [phone]         Me

dical



                                                        389             [place]

 

        2021-10-04 2021-10-04 Case            [facility]    1.2.840.114 [id number]

969 Univers



        00:00:00 00:00:00 Management         Erendira F   MULTISPEC 350.1.13.10      

   ity of



                                                IALTY   4.2.7.2.686         [place]

[place]



                                                CENTER  [phone]         Medi

luis alfredo



                                                AND DANGELO [address]             [place]



                                                DIABETES                 



                                                CLINIC                  

 

        2021-10-01 2021-10-01 Office          Abbe Blevins [facility]    1.2.840.114 [id number]

[id number] Univers



        15:08:17 16:51:40 Visit           Liza De La Cruz PRIMARY 350.1.13.10      

   ity of



                                                CARE    4.2.7.2.686         [place]

[place]



                                                PAVILLION [phone]         Me

dical



                                                        389             [place]

 

        2021-10-01 2021-10-01 Outpatient R       [facility]    [id number]

793 Univers



        15:10:00 15:10:00                 LIZA                         ity o

f



                                                                        [facility]

 

        2021-10-01 2021-10-01 Orders          Doctor  DIAN    1.2.840.114 [id number]

40 Univers



        00:00:00 00:00:00 Only            Unassigned, GARRETT   350.1.13.10       

  ity of



                                        [facility] 4.2.7.2.686         [place]

as



                                                        [phone]         Medi

luis alfredo



                                                        009             [place]

 

        [facility]21 [facility]    1.2.840.114 [id number]

576 Univers



        00:00:00 00:00:00                 [facility]  350.1.13.10         it

y of



                                        Abbe [place] 4.2.7.2.686         [place]

as



                                                Profbrettio [phone]         Me

dical



                                                nal     044             [place]



                                                Office                  



                                                Building                 



                                                One                     

 

        2021 Telephone         [facility]    1.2.842.127 [id number]

0330 Univers



        00:00:00 00:00:00                 Eamon   SPECIALTY 350.1.13.10         

ity of



                                                CARE    4.2.7.2.686         [place]

[place]



                                                CENTER .[id number]         Me

dical



                                                VICTORY 072             [facility]                   

 

        2021 Orders          Doctor  DIAN    1.2.840.114 [id number]

64 Univers



        00:00:00 00:00:00 Only            Unassigned, GARRETT   350.1.13.10       

  ity of



                                        [facility] 4.2.7.2.686         [place]

as



                                                        [phone]         Medi

luis alfredo



                                                        009             [place]

 

        2021 [facility]    1.2.840.114 [id number]

942 Univers



        00:00:00 00:00:00                 Kary   Health  350.1.13.10         it

y of



                                        Abbe [place] 4.2.7.2.686         [place]

as



                                                Professio [phone]         Me

denisha munoz     044             [place]



                                                Office                  



                                                Building                 



                                                One                     

 

        2021 Telephone         [facility]    1.2.219.746 [id number]

3314 Univers



        00:00:00 00:00:00                 Eamon   SPECIALTY 350.1.13.10         

ity of



                                                CARE    4.2.7.2.686         [place]

[place]



                                                CENTER .[id number]         Me

denisha MUNIZ 2             [facility]                   

 

        2021 Orders          Doctor  DIAN    1.2.840.114 [id number]

01 Univers



        00:00:00 00:00:00 Only            Unassigned, GARRETT   350.1.13.10       

  ity of



                                        [facility] 4.2.7.2.686         Vinay

as



                                                        [phone]         [address]

 

        2021 Telephone         [facility]    1.2.614.558 7223

5619 Univers



        00:00:00 00:00:00                 Eamon   SPECIALTY 350.1.13.10         

ity of



                                                CARE    4.2.7.2.686         [place]

[place]



                                                CENTER .[id number]         Me

arturoal



                                                CRISTY 2             [facility]                   

 

        2021 Hospital         [facility]    1.2.840.114 [id number]

207 Univers



        07:33:00 13:10:00 Encounter         Eamon   Health  350.1.13.10         

ity of



                                                [place]  4.2.7.2.686         [place]

[place]



                                                [place]    [phone]         [facility]  049             [facility]                 



                                                ([facility])                   

 

        2021 Surgery         [facility]    1.2.840.114 [id number]

74 Univers



        09:00:00 10:45:00                 Eamon   SPECIALTY 350.1.13.10         

ity of



                                                CARE    4.2.7.2.686         [place]

[place]



                                                CENTER .[id number]         Me

denisha MUNIZ 020             [facility]                   

 

        2021 Orders          Doctor  DIAN    1.2.840.114 [id number]

26 Univers



        00:00:00 00:00:00 Only            Unassigned, GARRETT   350.1.13.10       

  ity of



                                        [facility] 4.2.7.2.686         [place]inay

as



                                                        [phone]         [address]

 

        2021 Salty Strauss [facility]    1.2.840.114 [id number]

922 Univers



        00:00:00 00:00:00                 [facility]  350.1.13.10         it

y of



                                        Abbe Jackson 4.2.7.2.686         [place]

as



                                                Professio [phone]         Me

dical



                                                [facility]     044             [place]



                                                Office                  



                                                [facility]                 



                                                One                     

 

        2021 Orders          Doctor  DIAN    1.2.840.114 [id number]

55 Univers



        00:00:00 00:00:00 Only            Unassigned, GARRETT   350.1.13.10       

  ity of



                                        [facility] 4.2.7.2.686         [place]

as



                                                        [phone]         [address]

 

        2021 Patient         Doctor  [facility]    1.2.840.114 [id number]

11 Univers



        00:00:00 00:00:00 Secure Msg         Unassigned, SPECIALTY 350.1.13.10  

       ity of



                                        [facility] C[facility]    4.2.7.2.686         [place]

[place]



                                                CENTER .[id number]         Me

denisha MUNIZ 072             [facility]                   

 

        2021 Patient         Doctor  [facility]    1.2.840.114 [id number]

70 Univers



        00:00:00 00:00:00 Secure Msg         Unassigned, PRIMARY 350.1.13.10    

     ity of



                                        [facility] CARE    4.2.7.2.686         [place]

[place]



                                                PAVILLION [phone]         Me

dical



                                                        [address]             [place]

 

        2021 Office          Alfred [facility]    1.2.840.114 [id number]

04 Univers



        10:01:38 11:01:42 Visit           Eamon   SPECIALTY 350.1.13.10         

ity of



                                                CARE    4.2.7.2.686         [place]

[place]



                                                CENTER .[id number]         Me

denisha MUNIZ [facility]             [facility]                   

 

        2021 Outpatient R       [facility]    [id number]

508 Univers



        10:30:00 10:30:00                 EAMON                           ity of



                                                                        [facility]

 

        [facility]    1.2.840.114 [id number]

001 Univers



        00:00:00 00:00:00                 [facility]  350.1.13.10         it

y of



                                        Edward  [place] 4.2.7.2.686         Vinay

as



                                                Professio [phone]         [address]



                                                Office                  



                                                [facility]                 



                                                One                     

 

        2021 Refill          DIAN Soni    1.2.840.114 [id number]

00 Univers



        00:00:00 00:00:00                 Patient GARRETT   350.1.13.10         it

y of



                                        [facility] 4.2.7.2.686         Te

[place]



                                        Have A          [phone]         [address]

 

        2021 [facility]          [facility]    1.2.840.114 [id number]

883 Univers



        00:00:00 00:00:00                 [facility]  350.1.13.10         it

y of



                                        Edward  [place] 4.2.7.2.686         Vinay

as



                                                Professio [phone]         [address]                 



                                                One                     

 

        2021 Telephone         [facility]    1.2.271.916 5455

5799 Univers



        00:00:00 00:00:00                 Eamon   SPECIALTY 350.1.13.10         

ity of



                                                CARE    4.2.7.2.686         [place]

[place]



                                                CENTER .[id number]         Me

denisha MUNIZ [address] [place] [address]                   

 

        2021-05-15 2021-05-15 Telephone         [facility]    1.2.904.938 [id number]

1110 Univers



        00:00:00 00:00:00                 Eamon   SPECIALTY 350.1.13.10         

ity of



                                                CARE    4.2.7.2.686         [place]

[place]



                                                CENTER .[id number]         Me

denisha MUNIZ [address] [place] [address]                   

 

        2021 Hospital         [facility] 1.2.840.114 [id number]

10391 Univers



        11:25:00 15:03:00 Encounter         Eamon   ICAL    350.1.13.10         

ity of



                                                SCIENCES 4.2.7.2.686         [place]

as



                                                BLDG    [phone]         Medi

luis alfredo



                                                        [address]             [place]

 

        2021 Surgery         Alfred [facility] 1.2.153.730 [id number]

2605 Univers



        12:00:00 14:06:00                 Eamon   ICAL    350.1.13.10         it

y of



                                                SCIENCES 4.2.7.2.686         [place]

as



                                                BLDG    [phone]         Medi

luis alfredo



                                                        020             [place]

 

        2021 Orders          Doctor  DIAN    1.2.840.114 [id number]

97 Univers



        00:00:00 00:00:00 Only            Unassigned, GARRETT   350.1.13.10       

  ity of



                                        [facility] 4.2.7.2.686         Vinay

as



                                                        [phone]         Medi

luis alfredo



                                                        009             [place]

 

        2021 Technician         [facility]    1.2.840.114 842

77808 Univers



        10:46:44 11:01:44 Visit           Eamon Simon SPECIALTY 350.1.13.10   

      ity of



                                                CARE    4.2.7.2.686         [place]

[place]



                                                CENTER .[id number]         Me

denisha MUNIZ [address] [place] [address]                   

 

        2021 Laboratory         Only, Lcc Test [facility]    1.2.840.

114 [id number] 

Univers



        10:34:15 10:49:15 Only            Simon, Eamon SPECIALTY 350.1.13.10   

      ity of



                                                CARE    4.2.7.2.686         [place]

[place]



                                                CENTER .[id number]         Me

diccarlos MUNIZ [address] [place] [address]                   

 

        2021 Outpatient R       ALFRED [facility]    [id number]

880 Univers



        10:45:00 10:45:00                 EAMON                           ity of



                                                                        [facility]

 

        2021 Technician         [facility]    1.2.840.114 842

28476 Univers



        09:43:35 09:58:35 Visit           Reno Patricia 350.1.13.10   

      ity of



                                                CARE    4.2.7.2.686         [place]

[place]



                                                PAVILLION [phone]         [facility]



                                                        [address]             [place]

 

        2021 [facility]    1.2.840.114 [id number]

606 Univers



        00:00:00 00:00:00                 Kary   Health  350.1.13.10         it

y of



                                        Edward  [place] 4.2.7.2.686         Vinay

as



                                                Professio [phone]         [address]



                                                Office                  



                                                Building                 



                                                One                     

 

        2021 [facility]    1.2.840.114 [id number]

659 Univers



        00:00:00 00:00:00                 [facility]  350.1.13.10         it

y of



                                        [place]  [place] 4.2.7.2.686         Vinay

as



                                                Professio [phone]         [address]                     

 

        2021 Office          Alfred [facility]    1.2.840.114 [id number]

80 Univers



        08:31:02 12:29:21 Visit           Eamon   SPECIALTY 350.1.13.10         

ity of



                                                CARE    4.2.7.2.686         [place]

[place]



                                                CENTER .[id number]         Me

denisha MUNIZ 072             [facility]                   

 

        2021 Technician         [facility]    1.2.840.114 835

59811 Univers



        10:02:17 10:08:16 Visit           Eamon Simno SPECIALTY 350.1.13.10   

      ity of



                                                CARE    4.2.7.2.686         [place]

[place]



                                                CENTER .[id number]         Me

denisha MUNIZ 353             [facility]                   

 

        2021 Outpatient R       ALFRED [facility]    [id number]

734 Univers



        09:00:00 09:00:00                 EAMON                           ity of



                                                                        [facility]

 

        2021 [facility]         [facility] 1.2.840.114 [id number]

[id number] Univers



        13:40:00 23:59:00 Encounter         Reno    [facility] 350.1.13.10        

 ity of



                                                CLINICS 4.2.7.2.686         [place]

[place]



                                                        [phone]         Medi

luis alfredo



                                                        801             [place]

 

        2021 Outpatient R       HARSHAD, [facility]    [id number]

69190 Univers



        00:00:00 00:00:00                 RENO                             [facility]

 

        2021 Outpatient R       KALEYGERMÁN, [facility]    [id number]

6742 Univers



        10:30:00 10:30:00                 KARY                           [facility]

 

        2021 Outpatient R       ÁNGELA, [facility]    [id number]

808 Univers



        15:30:00 15:30:00                 LETICIA                          [facility]

 

        2021 Patient         Anais Mace Kavya  1.2.840.114 [id number]

[id number] Univers



        00:00:00 00:00:00 Outreach         E       [place]   350.1.13.10         i

ty of



                                                [place]   4.2.7.2.686         [place]

[place]



                                                        [phone]         Medi

luis alfredo



                                                        403             [place]

 

        2021 Telephone         [facility]    1.2.840.114 

[id number] Univers



        00:00:00 00:00:00                 an, Tita PRIMARY 350.1.13.10        

 ity of



                                                CARE    4.2.7.2.686         [place]

[place]



                                                PAVILLION [phone]         Me

dical



                                                        389             [place]

 

        2021 Patient         Alfred [facility]    1.2.840.114 [id number]

72 Univers



        00:00:00 00:00:00 Secure Msg         Eamon   SPECIALTY 350.1.13.10      

   ity of



                                                CARE    4.2.7.2.686         [place]

[place]



                                                CENTER .[id number]         Me

dical



                                                VICTORY 072             [facility]                   

 

        2021 Patient         Anais Macegalindo  1.2.840.114 82

[id number] Univers



        00:00:00 00:00:00 Outreach         E       [place]   350.1.13.10         i

ty of



                                                [place]   4.2.7.2.686         [place]

[place]



                                                        [phone]         Medi

luis alfredo



                                                        403             [place]

 

        2021 Telephone         [facility]    1.2.840.114 

[id number] Univers



        00:00:00 00:00:00                 Tita washington PRIMARY 350.1.13.10        

 ity of



                                                CARE    4.2.7.2.686         [place]

[place]



                                                PAVILLION [phone]         Me

dical



                                                        389             [place]

 

        2021 Telephone         [facility]    1.2.840.114 

[id number] Univers



        00:00:00 00:00:00                 Tita washington PRIMARY 350.1.13.10        

 ity of



                                                CARE    4.2.7.2.686         [place]

[place]



                                                PAVILLION [phone]         Me

dical



                                                        389             [place]

 

        2021 Technician         [facility]    1.2.840.114 829

87350 Univers



        15:14:29 15:29:29 Visit           Unknown, Attending PRIMARY 350.1.13.10

         ity of



                                                CARE    4.2.7.2.686         [place]

[place]



                                                PAVILLION [phone]         Me

dical



                                                        366             [place]

 

        2021 Office          Tita Pruitt [facility]    1.2.

840.114 [id number] 

Univers



        13:31:11 14:01:11 Visit           Reno Patricia PRIMARY 350.1.13.10   

      ity of



                                                CARE    4.2.7.2.686         [place]

[place]



                                                PAVILLION [phone]         Me

dical



                                                        [address]             [place]

 

        2021 Outpatient R       HARSHAD [facility]    [id number]

73428 Univers



        11:40:00 11:40:00                 RENO                             ity of



                                                                        [facility]

 

        2021 Salty Strauss [facility]    1.2.840.114 [id number]

371 Univers



        00:00:00 00:00:00                 [facility]  350.1.13.10         it

y of



                                        Abbe [place] 4.2.7.2.686         Vin[place]

as



                                                Professio [phone]         Me

dical



                                                nal     044             [place]



                                                Office                  



                                                Building                 



                                                One                     

 

        2021 Patient         Anais Mace  1.2.840.114 82

[id number] Univers



        00:00:00 00:00:00 Outreach         E       [place]   350.1.13.10         i

ty of



                                                [place]   4.2.7.2.686         [place]

[place]



                                                        [phone]         [address]

 

        2021 Telephone         Héctor [facility]    1.2.840.114 [id number]

71538 Univers



        00:00:00 00:00:00                 Steve [place] 350.1.13.10      

   ity of



                                                Christos 4.2.7.2.686         [place]

[place]



                                                Professio [phone]         Me

dical



                                                nal     092             [facility]                 

 

        2021 Office          [facility]    1.2.840.114 [id number]

883 Univers



        10:04:49 11:12:53 Visit           Steve [place] 350.1.13.10      

   ity of



                                                [place] 4.2.7.2.686         [place]

[place]



                                                Professio [phone]         Me

dical



                                                nal     092             [facility]                 

 

        2021 Outpatient STEVE HUMPHREYS [facility]   

 [id number] Univers



        10:00:00 11:12:53                 STEVE CASTILLO                       

  [facility]

 

        2021 Outpatient STEVE HUMPHREYS [facility]   

 [id number] Univers



        10:00:00 11:12:53                 STEVE CASTILLO                       

  [facility]

 

        2021 Outpatient VALENTE STRAUSS [facility]    [id number]

6515 Univers



        13:45:00 13:45:00                 KARY                           [facility]

 

        2021 Office          [facility]    1.2.840.114 [id number]

354 Univers



        13:00:56 13:30:56 Visit           [facility]  350.1.13.10         it

y of



                                        Abbe [place] 4.2.7.2.686         Vin[place]

as



                                                Professio [phone]         Me

dical



                                                nal     044             [place]



                                                Office                  



                                                Building                 



                                                One                     

 

        2021 Outpatient                 [facility]    [id number]

872 Univers



        13:50:00 13:50:00                                                 ity [facility]

 

        2021 Emergency         PERFECTO Mchugh [facility]    1.2.840.114 82

[id number] Univers



        18:04:00 00:48:00                 Dina [place] 350.1.13.10         i

ty of



                                                [place] 4.2.7.2.686         [place]

[place]



                                                [place]  [phone]         Medi

luis alfredo



                                                        084             [place]

 

        2021 Emergency X       PERFECTO MCHUGH [facility]    ERT     [id number]

0700 Univers



        18:04:00 00:48:00                                                 ity of



                                                                        [facility]

 

        2021 Patient         Anais Mace  1.2.840.114 [id number]

[id number] Univers



        00:00:00 00:00:00 Outreach         E       [place]   350.1.13.10         i

ty of



                                                [place]   4.2.7.2.686         [place]

[place]



                                                        [phone]         Medi

luis alfredo



                                                        403             [place]

 

        2021 Outpatient R       ALFRED [facility]    [id number]

064 Univers



        13:00:00 13:00:00                 EAMON                           ity of



                                                                        [facility]

 

        2021 [facility]         [facility]    1.2.840.114 [id number]

01 Univers



        00:00:00 00:00:00 Secure Msg         Eamon   SPECIALTY 350.1.13.10      

   ity of



                                                CARE    4.2.7.2.686         [facility] .[id number]         Me

arturoal



                                                VICTORY 072             [facility]                   

 

        2021 Patient         Carlos A [facility]    1.2.840.114 [id number]

983 Univers



        00:00:00 00:00:00 Secure Msg         [facility]  350.1.13.10        

 ity of



                                        [place] 4.2.7.2.686         [place]

as



                                                Professio [phone]         Me

dical



                                                nal     044             [place]



                                                Office                  



                                                [facility]                 



                                                One                     

 

        2021 Office          Carlos A [facility]    1.2.840.114 [id number]

336 Univers



        13:50:32 14:20:32 Visit           [facility]  350.1.13.10         it

y of



                                        Edward  [place] 4.2.7.2.686         Vinay

as



                                                Professio [phone]         Me

dical



                                                nal     044             [facility]                 



                                                One                     

 

        2021 Outpatient R       CARLOS A [facility]    [id number]

5763 Univers



        14:00:00 14:00:00                 KARY                           ity of



                                                                        [facility] [facility]

 

        2021 Patient         Anais Mace Kavya  1.2.840.114 81

[id number] [facility]



        00:00:00 00:00:00 Outreach         E       [place]   350.1.13.10         i

ty of



                                                [place]   4.2.7.2.686         [place]

[place]



                                                        [phone]         [address]

 

        2021 Patient         Anais Mace Kavya  1.2.840.114 81

[id number] [facility]



        00:00:00 00:00:00 Outreach         E       [place]   350.1.13.10         i

ty of



                                                [place]   4.2.7.2.686         [place]

[place]



                                                        [phone]         [address]

 

        2021 Transition         Kavya Lorenz  1.2.840.114 818

[id number] Univers



        00:00:00 00:00:00 of Care         Hortencia [place]   350.1.13.10        

 ity of



                                                [place]   4.2.7.2.686         [place]

[place]



                                                        [phone]         Medi

luis alfredo



                                                        [address]             [place]

 

        2021 Hospital         Amrit Lopez  1.2.840.1

14 [id number] 

Univers



        19:21:00 16:05:00 Encounter         Keisha Dudley   350.1.13.10  

       ity of



                                        [facility] 4.2.7.2.686     

    [place]



                                                        [phone]         Medi

luis alfredo



                                                        093             [place]

 

        20[facility]     [id number]

136 Univers



        19:21:00 16:05:00                 [facility]                           ity of



                                                                        [facility]

 

        2021-02-10 2021-02-10 Patient         Anais Mace Kavya  1.2.840.114 81

[id number] [facility]



        00:00:00 00:00:00 Outreach         E       [place]   350.1.13.10         i

ty of



                                                [place]   4.2.7.2.686         [place]

[place]



                                                        [phone]         [address]

 

        2021 [facility]         [facility]    1.2.840.114 814

[id number] Univers



        00:00:00 00:00:00                 [facility]  350.1.13.10         it

y of



                                        Edward  [place] 4.2.7.2.686         [place]

as



                                                Professio [phone]         [address]



                                                Office                  



                                                [facility]                 



                                                One                     

 

        2021 Telephone         [facility]    1.2.840.114 812

[id number] Univers



        00:00:00 00:00:00                 [facility]  350.1.13.10         it

y of



                                        Edward  [place] 4.2.7.2.686         [place]

as



                                                Professio [phone]         [address]                     

 

        2021 Telephone         [facility]    1.2.840.114 813

[id number] Univers



        00:00:00 00:00:00                 [facility]  350.1.13.10         it

y of



                                        Edward  [place] 4.2.7.2.686         [place]

as



                                                Professio [phone]         [address]                 



                                                One                     

 

        2021 Office          [facility]    1.2.840.114 [id number]

036 Univers



        15:05:56 15:35:56 Visit           [facility]  350.1.13.10         it

y of



                                        Edward  [place] 4.2.7.2.686         [place]

as



                                                Professio [phone]         [address]                 



                                                One                     

 

        2021 Outpatient R       [facility]    [id number]

[id number] Univers



        15:00:00 15:00:00                 KARY                           ity of



                                                                        [facility]

 

        2021 Orders          Doctor  DIAN    1.2.840.114 [id number]

[id number] Univers



        00:00:00 00:00:00 Only            Unassigned, GARRETT   350.1.13.10       

  ity of



                                        [facility] 4.2.7.2.686         [place]

as



                                                        [phone]         [address]

 

        2021 Patient         Anais Mace  1.2.840.114 81

[id number] [facility]



        00:00:00 00:00:00 Outreach         E       [place]   350.1.13.10         i

ty of



                                                [place]   4.2.7.2.686         [place]

[place]



                                                        [phone]         Medi

luis alfredo



                                                        403             [place]

 

        2021 Telephone         DIAN Bah    1.2.521.323 [id number]

6431 Univers



        00:00:00 00:00:00                 Dian TUCKER   350.1.13.10         it

y of



                                        [facility] 4.2.7.2.686         [place]

as



                                                        [phone]         Medi

luis alfredo



                                                        025             [place]

 

        2021 Telephone         [facility]    1.2.840.114 [id number]

[id number] Univers



        00:00:00 00:00:00                 [facility] 350.1.13.10         i

ty of



                                                CARE    4.2.7.2.686         [place]

[place]



                                                PA[place] [phone]         Me

dical



                                                        388             [place]

 

        2021 Transition         Kavya Alvarez  1.2.840.114 808

[id number] Univers



        00:00:00 00:00:00 of Care         France Whitlocky   350.1.13.10         i

ty of



                                                [place]   4.2.7.2.686         [place]

[place]



                                                        [phone]         Medi

luis alfredo



                                                        403             [place]

 

        2021 Telephone         [facility]    1.2.840.114 [id number]

[id number] Univers



        00:00:00 00:00:00                 Ana Maria   PRIMARY 350.1.13.10         it

y of



                                                CARE    4.2.7.2.686         [place]

[place]



                                                PA[place]ILL[place] [phone]         Me

dical



                                                        388             [place]

 

        2020 [facility]         Amrit Lopez  1.2.840.1

14 [id number] 

Univers



        12:52:00 16:14:00 Encounter         Rocco Simon   350.1.13.10 

        ity of



                                        [facility] 4.2.7.2.686     

    [place]



                                        Ana Maria Rajput         [phone]     

    [facility]



                                                        095             [place]

 

        2020 [facility] X       [facility]     [id number]

[id number] Univers



        12:52:00 16:14:00                 ANA MARIA                           ity of



                                                                        [facility]

 

        2021 Izaiah Frazier  [facility] 1.2.940.048 [id number]

[id number] Univers



        00:00:00 00:00:00 Management         Terrence MANZANARES 350.1.13.10       

  [facility] 4.2.7.2.686         [place]

[place]



                                                        [phone]         Medi

luis alfredo



                                                        071             [place]







Results







           Test Description Test Time  Test Comments Results    Result Comments 

Source









                    POCT HEMOGLOBIN A1C TEST 2023 21:15:00 









                      Test Item  Value      Reference Range Interpretation Comme

nts









             POCT HBA1C (test code = 4548-4) 7.7 %        4-6          A        

    

 

             Lab Interpretation (test code = 10195-8) Abnormal                  

             



[facility] HEMOGLOBIN A1C [id number] 21:15:00





             Test Item    Value        Reference Range Interpretation Comments

 

             POCT HBA1C (test code = 4548-4) 7.7 %        4-6          A        

    

 

             Lab Interpretation (test code = Abnormal                           

    



             17795-7)                                            



[facility] HEMOGLOBIN A1C [id number] 21:15:00





             Test Item    Value        Reference Range Interpretation Comments

 

             POCT HBA1C (test code = 4548-4) 7.7 %        4-6          A        

    

 

             Lab Interpretation (test code = Abnormal                           

    



             77806-4)                                            



[facility] [place] [facility]OL[facility] ([id number])2023 
00:47:17





             Test Item    Value        Reference Range Interpretation Comments

 

             NA (test code = 138 mmol/L   135-145                   



             1139411396)                                         

 

             K (test code = 4.2 mmol/L   3.5-5.0                   



             3977079470)                                         

 

             CL (test code = 104 mmol/L                       



             1428319301)                                         

 

             CO2 TOTAL (test code = 24 mmol/L    23-31                     



             0906659300)                                         

 

             AGAP (test code = 10           2-16                      



             4922123099)                                         

 

             BUN (test code = 9 mg/dL      7-23                      



             4910424842)                                         

 

             GLUCOSE (test code = 266 mg/dL           H            



             0018600884)                                         

 

             CREATININE (test code = 0.87 mg/dL   0.60-1.25                 



             2006713774)                                         

 

             TOTAL BILI (test code = 0.4 mg/dL    0.1-1.1                   



             2413577488)                                         

 

             CALCIUM (test code = 9.4 mg/dL    8.6-10.6                  



             7485503534)                                         

 

             T PROTEIN (test code = 7.4 g/dL     6.3-8.2                   



             3454090465)                                         

 

             ALBUMIN (test code = 4.6 g/dL     3.5-5.0                   



             3627742306)                                         

 

             ALK PHOS (test code = 54 U/L                           



             9397976025)                                         

 

             ALTv (test code = 19 U/L       5-50                      



             1742-6)                                             

 

             AST(SGOT) (test code = 21 U/L       13-40                     



             9122049514)                                         

 

             eGFR (test code = 99.9         mL/min/1.73m2              



             0088590493)                                         

 

             ARVIND (test code = ARVIND) Association of                           



                          Glomerular Filtration                           



                          Rate (GFR) and Staging                           



                          of Kidney Disease*                           



                          +---------------------                           



                          --+-------------------                           



                          --+-------------------                           



                          ------+| GFR                           



                          (mL/min/1.73 m2) ?|                           



                          With Kidney Damage ?|                           



                          ?Without Kidney                           



                          Damage+---------------                           



                          --------+-------------                           



                          --------+-------------                           



                          ------------+| ?>90 ?                           



                          ? ? ? ? ? ? ? ?|                           



                          ?Stage one ? ? ? ? ?|                           



                          ? Normal ? ? ? ? ? ? ?                           



                          ?+--------------------                           



                          ---+------------------                           



                          ---+------------------                           



                          -------+| ?60-89 ? ? ?                           



                          ? ? ? ? ?| ?Stage two                           



                          ? ? ? ? ?| ? Decreased                           



                          GFR ? ? ? ?                            



                          +---------------------                           



                          --+-------------------                           



                          --+-------------------                           



                          ------+| ?30-59 ? ? ?                           



                          ? ? ? ? ?| ?Stage                           



                          three ? ? ? ?| ? Stage                           



                          three ? ? ? ? ?                           



                          +---------------------                           



                          --+-------------------                           



                          --+-------------------                           



                          ------+| ?15-29 ? ? ?                           



                          ? ? ? ? ?| ?Stage four                           



                          ? ? ? ? | ? Stage four                           



                          ? ? ? ? ?                              



                          ?+--------------------                           



                          ---+------------------                           



                          ---+------------------                           



                          -------+| ?<15 (or                           



                          dialysis) ? ?| ?Stage                           



                          five ? ? ? ? | ? Stage                           



                          five ? ? ? ? ?                           



                          ?+--------------------                           



                          ---+------------------                           



                          ---+------------------                           



                          -------+ *Each stage                           



                          assumes the associated                           



                          GFR level has been in                           



                          effect for at least                           



                          three months. ?Stages                           



                          1 to 5, with or                           



                          without kidney                           



                          disease, indicate                           



                          chronic kidney                           



                          disease. Notes:                           



                          Determination of                           



                          stages one and two                           



                          (with eGFR                             



                          >59mL/min/1.73 m2)                           



                          requires estimation of                           



                          kidney damage for at                           



                          least three months as                           



                          defined by structural                           



                          or functional                           



                          abnormalities of the                           



                          kidney, manifested by                           



                          either:Pathological                           



                          abnormalities or                           



                          Markers of kidney                           



                          damage (including                           



                          abnormalities in the                           



                          composition of the                           



                          blood or urine or                           



                          abnormalities in                           



                          imaging tests).                           

 

             Lab Interpretation Abnormal                               



             (test code = 84402-6)                                        



[facility] [place] [id number] 00:32:33





             Test Item    Value        Reference    Interpretation Comments



                                       Range                     

 

             D-DIMER (test code =              See_Comment                [Autom

ated



             [id number])                                         message] The



                                                                 system which



                                                                 generated this



                                                                 result



                                                                 transmitted



                                                                 reference range

:



                                                                 <0.41 ?g/mL



                                                                 (FEU). The



                                                                 reference range



                                                                 was not used to



                                                                 interpret this



                                                                 result as



                                                                 normal/abnormal

.

 

             ARVIND (test code = This test may be                           



             ARVIND)         used in conjunction                           



                          with a clinical                           



                          pretest probability                           



                          (PTP) assessment                           



                          model to exclude                           



                          venous                                 



                          thromboembolism                           



                          (VTE) in patients                           



                          suspected of deep                           



                          venous thrombosis                           



                          (DVT) and pulmonary                           



                          embolism (PE) A                           



                          D-Dimer value less                           



                          than 0.50 ?g/ml                           



                          (FEU) has a negative                           



                          predicative value of                           



                          96 to 100% (95%                           



                          CI)and 97 to 100%                           



                          (95% CI) as an aid                           



                          in the diagnosis of                           



                          deep vein thrombosis                           



                          (DVT) and pulmonary                           



                          embolism when there                           



                          is low or moderate                           



                          pretest probability                           



                          of PE or DVT.                           



                          D-Dimer values are                           



                          expressed in initial                           



                          fibrinogen                             



                          equivalent units                           



                          (FEU)" The assay                           



                          results should be                           



                          used with other                           



                          information,                           



                          including the                           



                          clinical context, in                           



                          forming a diagnosis.                           

 

             Lab Interpretation Normal                                 



             (test code =                                        



             03082-7)                                            



[facility] [place] [facility] -[id number] 00:26:16





             Test Item    Value        Reference Range Interpretation Comments

 

             TROPONIN I (test code = 0.002 ng/mL  <=0.034                   



             [id number])                                         

 

             ARVIND (test code = ARVIND) Reference (Normal)                           



                          Range (defined by the                           



                          99th percentile                           



                          reference limit): <=                           



                          0.034 ng/mL Note:                           



                          Cardiac troponin                           



                          begins to rise 3-4                           



                          hours after the onset                           



                          of ischemia. Repeat in                           



                          4-6 hours if the                           



                          sample was drawn                           



                          within 3-4 hours of                           



                          the onset of the                           



                          symptom and found                           



                          normal. Diagnosis of                           



                          myocardial injury is                           



                          made with acute                           



                          changes in cTn                           



                          concentrations with at                           



                          least one serial                           



                          sample above the 99th                           



                          percentile upper                           



                          reference limit (URL),                           



                          taken together with                           



                          the patient's clinical                           



                          presentation. Biotin                           



                          has been reported to                           



                          cause a negative bias,                           



                          interpret results                           



                          relative to patient's                           



                          use of biotin.                           

 

             Lab Interpretation Normal                                 



             (test code = 05956-4)                                        



[facility] WITH [id number] 00:00:16





             Test Item    Value        Reference Range Interpretation Comments

 

             WBC (test code = 6.93         See_Comment                [Automated



             7492-2)                                             message] The sy

stem



                                                                 which generated



                                                                 this result



                                                                 transmitted



                                                                 reference range

:



                                                                 4.20 - 10.70



                                                                 10*3/?L. The



                                                                 reference range

 was



                                                                 not used to



                                                                 interpret this



                                                                 result as



                                                                 normal/abnormal

.

 

             RBC (test code = 4.09         See_Comment  L             [Automated



             789-8)                                              message] The sy

stem



                                                                 which generated



                                                                 this result



                                                                 transmitted



                                                                 reference range

:



                                                                 4.26 - 5.52



                                                                 10*6/?L. The



                                                                 reference range

 was



                                                                 not used to



                                                                 interpret this



                                                                 result as



                                                                 normal/abnormal

.

 

             HGB (test code = 12.2 g/dL    12.2-16.4                 



             718-7)                                              

 

             HCT (test code = 35.0 %       38.4-49.3    L            



             4544-3)                                             

 

             MCV (test code = 85.6 fL      81.7-95.6                 



             787-2)                                              

 

             MCH (test code = 29.8 pg      26.1-32.7                 



             785-6)                                              

 

             MCHC (test code = 34.9 g/dL    31.2-35.0                 



             786-4)                                              

 

             RDW-SD (test code = 36.5 fL      38.5-51.6    L            



             47747-8)                                            

 

             RDW-CV (test code = 11.9 %       12.1-15.4    L            



             788-0)                                              

 

             PLT (test code = 211          See_Comment                [Automated



             777-3)                                              message] The sy

stem



                                                                 which generated



                                                                 this result



                                                                 transmitted



                                                                 reference range

:



                                                                 150 - 328 10*3/

?L.



                                                                 The reference r

kana



                                                                 was not used to



                                                                 interpret this



                                                                 result as



                                                                 normal/abnormal

.

 

             MPV (test code = 9.9 fL       9.8-13.0                  



             85212-8)                                            

 

             NRBC/100 WBC (test 0.0          See_Comment                [Automat

ed



             code = 4483126849)                                        message] 

The system



                                                                 which generated



                                                                 this result



                                                                 transmitted



                                                                 reference range

:



                                                                 0.0 - 10.0 /100



                                                                 WBCs. The refer

ence



                                                                 range was not u

sed



                                                                 to interpret th

is



                                                                 result as



                                                                 normal/abnormal

.

 

             NRBC x10^3 (test code              See_Comment                [Auto

mated



             = [id number])                                        message] The s

ystem



                                                                 which generated



                                                                 this result



                                                                 transmitted



                                                                 reference range

:



                                                                 10*3/?L. The



                                                                 reference range

 was



                                                                 not used to



                                                                 interpret this



                                                                 result as



                                                                 normal/abnormal

.

 

             GRAN MAT (NEUT) % 49.6 %                                 



             (test code = 770-8)                                        

 

             IMM GRAN % (test code 0.10 %                                 



             = 8632459614)                                        

 

             LYMPH % (test code = 41.1 %                                 



             736-9)                                              

 

             MONO % (test code = 7.2 %                                  



             5905-5)                                             

 

             EOS % (test code = 1.6 %                                  



             713-8)                                              

 

             BASO % (test code = 0.4 %                                  



             706-2)                                              

 

             GRAN MAT x10^3(ANC) 3.43 10*3/uL 1.99-6.95                 



             (test code =                                        



             6536045420)                                         

 

             IMM GRAN x10^3 (test              0.00-0.06                 



             code = 6432318119)                                        

 

             LYMPH x10^3 (test code 2.85 10*3/uL 1.09-3.23                 



             = 731-0)                                            

 

             MONO x10^3 (test code 0.50 10*3/uL 0.36-1.02                 



             = 742-7)                                            

 

             EOS x10^3 (test code = 0.11 10*3/uL 0.06-0.53                 



             711-2)                                              

 

             BASO x10^3 (test code 0.03 10*3/uL 0.01-0.09                 



             = 704-7)                                            

 

             Lab Interpretation Abnormal                               



             (test code = 07149-1)                                        



[facility]





             Test Item    Value        Reference Range Interpretation Comments

 

             POCT GLU (test code = 2483984438) 315 mg/dL           H      

      

 

             Lab Interpretation (test code = Abnormal                           

    



             62190-7)                                            



[facility] WITH [id number] 04:39:33





             Test Item    Value        Reference Range Interpretation Comments

 

             WBC (test code = 10.53        See_Comment                [Automated



             6690-2)                                             message] The sy

stem



                                                                 which generated



                                                                 this result



                                                                 transmitted



                                                                 reference range

:



                                                                 4.20 - 10.70



                                                                 10*3/?L. The



                                                                 reference range

 was



                                                                 not used to



                                                                 interpret this



                                                                 result as



                                                                 normal/abnormal

.

 

             RBC (test code = 3.93         See_Comment  L             [Automated



             039-8)                                              message] The sy

stem



                                                                 which generated



                                                                 this result



                                                                 transmitted



                                                                 reference range

:



                                                                 4.26 - 5.52



                                                                 10*6/?L. The



                                                                 reference range

 was



                                                                 not used to



                                                                 interpret this



                                                                 result as



                                                                 normal/abnormal

.

 

             HGB (test code = 11.7 g/dL    12.2-16.4    L            



             718-7)                                              

 

             HCT (test code = 34.1 %       38.4-49.3    L            



             4544-3)                                             

 

             MCV (test code = 86.8 fL      81.7-95.6                 



             787-2)                                              

 

             MCH (test code = 29.8 pg      26.1-32.7                 



             785-6)                                              

 

             MCHC (test code = 34.3 g/dL    31.2-35.0                 



             786-4)                                              

 

             RDW-SD (test code = 41.1 fL      38.5-51.6                 



             53563-7)                                            

 

             RDW-CV (test code = 13.1 %       12.1-15.4                 



             788-0)                                              

 

             PLT (test code = 203          See_Comment                [Automated



             777-3)                                              message] The sy

stem



                                                                 which generated



                                                                 this result



                                                                 transmitted



                                                                 reference range

:



                                                                 150 - 328 10*3/

?L.



                                                                 The reference r

kana



                                                                 was not used to



                                                                 interpret this



                                                                 result as



                                                                 normal/abnormal

.

 

             MPV (test code = 10.1 fL      9.8-13.0                  



             52187-3)                                            

 

             NRBC/100 WBC (test 0.0          See_Comment                [Automat

ed



             code = 4729311864)                                        message] 

The system



                                                                 which generated



                                                                 this result



                                                                 transmitted



                                                                 reference range

:



                                                                 0.0 - 10.0 /100



                                                                 WBCs. The refer

ence



                                                                 range was not u

sed



                                                                 to interpret th

is



                                                                 result as



                                                                 normal/abnormal

.

 

             NRBC x10^3 (test code              See_Comment                [Auto

mated



             = 9301014642)                                        message] The s

ystem



                                                                 which generated



                                                                 this result



                                                                 transmitted



                                                                 reference range

:



                                                                 10*3/?L. The



                                                                 reference range

 was



                                                                 not used to



                                                                 interpret this



                                                                 result as



                                                                 normal/abnormal

.

 

             GRAN MAT (NEUT) % 64.4 %                                 



             (test code = 770-8)                                        

 

             IMM GRAN % (test code 0.30 %                                 



             = 4059753270)                                        

 

             LYMPH % (test code = 27.1 %                                 



             736-9)                                              

 

             MONO % (test code = 6.7 %                                  



             5905-5)                                             

 

             EOS % (test code = 1.1 %                                  



             713-8)                                              

 

             BASO % (test code = 0.4 %                                  



             706-2)                                              

 

             GRAN MAT x10^3(ANC) 6.78 10*3/uL 1.99-6.95                 



             (test code =                                        



             6669656811)                                         

 

             IMM GRAN x10^3 (test 0.03 10*3/uL 0.00-0.06                 



             code = 5872409940)                                        

 

             LYMPH x10^3 (test code 2.85 10*3/uL 1.09-3.23                 



             = 731-0)                                            

 

             MONO x10^3 (test code 0.71 10*3/uL 0.36-1.02                 



             = 742-7)                                            

 

             EOS x10^3 (test code = 0.12 10*3/uL 0.06-0.53                 



             711-2)                                              

 

             BASO x10^3 (test code 0.04 10*3/uL 0.01-0.09                 



             = 704-7)                                            

 

             Lab Interpretation Abnormal                               



             (test code = 12745-4)                                        



[facility]. METABOLIC PANEL (20373)2023 
04:09:12





             Test Item    Value        Reference Range Interpretation Comments

 

             NA (test code = 137 mmol/L   135-145                   



             1349053398)                                         

 

             K (test code = 4.6 mmol/L   3.5-5.0                   



             4130902965)                                         

 

             CL (test code = 100 mmol/L                       



             7116152160)                                         

 

             CO2 TOTAL (test code = 27 mmol/L    23-31                     



             7248728913)                                         

 

             AGAP (test code = 10           2-16                      



             0587685574)                                         

 

             BUN (test code = 21 mg/dL     7-23                      



             1072256383)                                         

 

             GLUCOSE (test code = 220 mg/dL           H            



             8547931534)                                         

 

             CREATININE (test code = 0.89 mg/dL   0.60-1.25                 



             0863677047)                                         

 

             TOTAL BILI (test code = 0.6 mg/dL    0.1-1.1                   



             7003112093)                                         

 

             CALCIUM (test code = 10.0 mg/dL   8.6-10.6                  



             8045279476)                                         

 

             T PROTEIN (test code = 7.5 g/dL     6.3-8.2                   



             0804525062)                                         

 

             ALBUMIN (test code = 4.9 g/dL     3.5-5.0                   



             3665727321)                                         

 

             ALK PHOS (test code = 31 U/L              L            



             0909059507)                                         

 

             ALTv (test code = 19 U/L       5-50                      



             1742-6)                                             

 

             AST(SGOT) (test code = 27 U/L       13-40                     



             5717690117)                                         

 

             eGFR (test code = 97.3         mL/min/1.73m2              



             1413981524)                                         

 

             ARVIND (test code = ARVIND) Association of                           



                          Glomerular Filtration                           



                          Rate (GFR) and Staging                           



                          of Kidney Disease*                           



                          +---------------------                           



                          --+-------------------                           



                          --+-------------------                           



                          ------+| GFR                           



                          (mL/min/1.73 m2) ?|                           



                          With Kidney Damage ?|                           



                          ?Without Kidney                           



                          Damage+---------------                           



                          --------+-------------                           



                          --------+-------------                           



                          ------------+| ?>90 ?                           



                          ? ? ? ? ? ? ? ?|                           



                          ?Stage one ? ? ? ? ?|                           



                          ? Normal ? ? ? ? ? ? ?                           



                          ?+--------------------                           



                          ---+------------------                           



                          ---+------------------                           



                          -------+| ?60-89 ? ? ?                           



                          ? ? ? ? ?| ?Stage two                           



                          ? ? ? ? ?| ? Decreased                           



                          GFR ? ? ? ?                            



                          +---------------------                           



                          --+-------------------                           



                          --+-------------------                           



                          ------+| ?30-59 ? ? ?                           



                          ? ? ? ? ?| ?Stage                           



                          three ? ? ? ?| ? Stage                           



                          three ? ? ? ? ?                           



                          +---------------------                           



                          --+-------------------                           



                          --+-------------------                           



                          ------+| ?15-29 ? ? ?                           



                          ? ? ? ? ?| ?Stage four                           



                          ? ? ? ? | ? Stage four                           



                          ? ? ? ? ?                              



                          ?+--------------------                           



                          ---+------------------                           



                          ---+------------------                           



                          -------+| ?<15 (or                           



                          dialysis) ? ?| ?Stage                           



                          five ? ? ? ? | ? Stage                           



                          five ? ? ? ? ?                           



                          ?+--------------------                           



                          ---+------------------                           



                          ---+------------------                           



                          -------+ *Each stage                           



                          assumes the associated                           



                          GFR level has been in                           



                          effect for at least                           



                          three months. ?Stages                           



                          1 to 5, with or                           



                          without kidney                           



                          disease, indicate                           



                          chronic kidney                           



                          disease. Notes:                           



                          Determination of                           



                          stages one and two                           



                          (with eGFR                             



                          >59mL/min/1.73 m2)                           



                          requires estimation of                           



                          kidney damage for at                           



                          least three months as                           



                          defined by structural                           



                          or functional                           



                          abnormalities of the                           



                          kidney, manifested by                           



                          either:Pathological                           



                          abnormalities or                           



                          Markers of kidney                           



                          damage (including                           



                          abnormalities in the                           



                          composition of the                           



                          blood or urine or                           



                          abnormalities in                           



                          imaging tests).                           

 

             Lab Interpretation Abnormal                               



             (test code = 04522-3)                                        



[facility]





             Test Item    Value        Reference Range Interpretation Comments

 

             LIPASE (test code = 2932525406) 44 U/L       0-220                 

    

 

             Lab Interpretation (test code = Normal                             

    



             03192-3)                                            



[facility] WITH [id number] 03:01:22





             Test Item    Value        Reference Range Interpretation Comments

 

             WBC (test code = 7.45         See_Comment                [Automated



             7990-2)                                             message] The sy

stem



                                                                 which generated



                                                                 this result



                                                                 transmitted



                                                                 reference range

:



                                                                 4.20 - 10.70



                                                                 10*3/?L. The



                                                                 reference range

 was



                                                                 not used to



                                                                 interpret this



                                                                 result as



                                                                 normal/abnormal

.

 

             RBC (test code = 4.10         See_Comment  L             [Automated



             849-8)                                              message] The sy

stem



                                                                 which generated



                                                                 this result



                                                                 transmitted



                                                                 reference range

:



                                                                 4.26 - 5.52



                                                                 10*6/?L. The



                                                                 reference range

 was



                                                                 not used to



                                                                 interpret this



                                                                 result as



                                                                 normal/abnormal

.

 

             HGB (test code = 11.9 g/dL    12.2-16.4    L            



             718-7)                                              

 

             HCT (test code = 34.8 %       38.4-49.3    L            



             4544-3)                                             

 

             MCV (test code = 84.9 fL      81.7-95.6                 



             787-2)                                              

 

             MCH (test code = 29.0 pg      26.1-32.7                 



             785-6)                                              

 

             MCHC (test code = 34.2 g/dL    31.2-35.0                 



             786-4)                                              

 

             RDW-SD (test code = 37.0 fL      38.5-51.6    L            



             19650-2)                                            

 

             RDW-CV (test code = 12.2 %       12.1-15.4                 



             788-0)                                              

 

             PLT (test code = 204          See_Comment                [Automated



             777-3)                                              message] The sy

stem



                                                                 which generated



                                                                 this result



                                                                 transmitted



                                                                 reference range

:



                                                                 150 - 328 10*3/

?L.



                                                                 The reference r

kana



                                                                 was not used to



                                                                 interpret this



                                                                 result as



                                                                 normal/abnormal

.

 

             MPV (test code = 9.7 fL       9.8-13.0     L            



             87777-3)                                            

 

             NRBC/100 WBC (test 0.0          See_Comment                [Automat

ed



             code = 5973817158)                                        message] 

The system



                                                                 which generated



                                                                 this result



                                                                 transmitted



                                                                 reference range

:



                                                                 0.0 - 10.0 /100



                                                                 WBCs. The refer

ence



                                                                 range was not u

sed



                                                                 to interpret th

is



                                                                 result as



                                                                 normal/abnormal

.

 

             NRBC x10^3 (test code              See_Comment                [Auto

mated



             = [id number])                                        message] The s

ystem



                                                                 which generated



                                                                 this result



                                                                 transmitted



                                                                 reference range

:



                                                                 10*3/?L. The



                                                                 reference range

 was



                                                                 not used to



                                                                 interpret this



                                                                 result as



                                                                 normal/abnormal

.

 

             GRAN MAT (NEUT) % 44.7 %                                 



             (test code = 770-8)                                        

 

             IMM GRAN % (test code 0.30 %                                 



             = 9567384906)                                        

 

             LYMPH % (test code = 45.9 %                                 



             736-9)                                              

 

             MONO % (test code = 7.1 %                                  



             5905-5)                                             

 

             EOS % (test code = 1.6 %                                  



             713-8)                                              

 

             BASO % (test code = 0.4 %                                  



             706-2)                                              

 

             GRAN MAT x10^3(ANC) 3.33 10*3/uL 1.99-6.95                 



             (test code =                                        



             0369748402)                                         

 

             IMM GRAN x10^3 (test              0.00-0.06                 



             code = 3466328460)                                        

 

             LYMPH x10^3 (test code 3.42 10*3/uL 1.09-3.23    H            



             = 731-0)                                            

 

             MONO x10^3 (test code 0.53 10*3/uL 0.36-1.02                 



             = 742-7)                                            

 

             EOS x10^3 (test code = 0.12 10*3/uL 0.06-0.53                 



             711-2)                                              

 

             BASO x10^3 (test code 0.03 10*3/uL 0.01-0.09                 



             = 704-7)                                            

 

             Lab Interpretation Abnormal                               



             (test code = 41293-4)                                        



[facility]. METABOLIC PANEL (65679)2023 
02:51:41





             Test Item    Value        Reference Range Interpretation Comments

 

             NA (test code = 137 mmol/L   135-145                   



             3417744952)                                         

 

             K (test code = 4.0 mmol/L   3.5-5.0                   



             8661524796)                                         

 

             CL (test code = 104 mmol/L                       



             0180666211)                                         

 

             CO2 TOTAL (test code = 27 mmol/L    23-31                     



             2471285192)                                         

 

             AGAP (test code = 6            2-16                      



             7877322128)                                         

 

             BUN (test code = 16 mg/dL     7-23                      



             2815379712)                                         

 

             GLUCOSE (test code = 99 mg/dL                         



             2995010999)                                         

 

             CREATININE (test code = 0.74 mg/dL   0.60-1.25                 



             3265949262)                                         

 

             TOTAL BILI (test code = 0.6 mg/dL    0.1-1.1                   



             5363683956)                                         

 

             CALCIUM (test code = 9.8 mg/dL    8.6-10.6                  



             2921310282)                                         

 

             T PROTEIN (test code = 7.2 g/dL     6.3-8.2                   



             0643259270)                                         

 

             ALBUMIN (test code = 4.7 g/dL     3.5-5.0                   



             4112292341)                                         

 

             ALK PHOS (test code = 27 U/L              L            



             1713484081)                                         

 

             ALTv (test code = 18 U/L       5-50                      



             1742-6)                                             

 

             AST(SGOT) (test code = 23 U/L       13-40                     



             1400005703)                                         

 

             eGFR (test code = 120.4        mL/min/1.73m2              



             5494704880)                                         

 

             ARVIND (test code = ARVIND) Association of                           



                          Glomerular Filtration                           



                          Rate (GFR) and Staging                           



                          of Kidney Disease*                           



                          +---------------------                           



                          --+-------------------                           



                          --+-------------------                           



                          ------+| GFR                           



                          (mL/min/1.73 m2) ?|                           



                          With Kidney Damage ?|                           



                          ?Without Kidney                           



                          Damage+---------------                           



                          --------+-------------                           



                          --------+-------------                           



                          ------------+| ?>90 ?                           



                          ? ? ? ? ? ? ? ?|                           



                          ?Stage one ? ? ? ? ?|                           



                          ? Normal ? ? ? ? ? ? ?                           



                          ?+--------------------                           



                          ---+------------------                           



                          ---+------------------                           



                          -------+| ?60-89 ? ? ?                           



                          ? ? ? ? ?| ?Stage two                           



                          ? ? ? ? ?| ? Decreased                           



                          GFR ? ? ? ?                            



                          +---------------------                           



                          --+-------------------                           



                          --+-------------------                           



                          ------+| ?30-59 ? ? ?                           



                          ? ? ? ? ?| ?Stage                           



                          three ? ? ? ?| ? Stage                           



                          three ? ? ? ? ?                           



                          +---------------------                           



                          --+-------------------                           



                          --+-------------------                           



                          ------+| ?15-29 ? ? ?                           



                          ? ? ? ? ?| ?Stage four                           



                          ? ? ? ? | ? Stage four                           



                          ? ? ? ? ?                              



                          ?+--------------------                           



                          ---+------------------                           



                          ---+------------------                           



                          -------+| ?<15 (or                           



                          dialysis) ? ?| ?Stage                           



                          five ? ? ? ? | ? Stage                           



                          five ? ? ? ? ?                           



                          ?+--------------------                           



                          ---+------------------                           



                          ---+------------------                           



                          -------+ *Each stage                           



                          assumes the associated                           



                          GFR level has been in                           



                          effect for at least                           



                          three months. ?Stages                           



                          1 to 5, with or                           



                          without kidney                           



                          disease, indicate                           



                          chronic kidney                           



                          disease. Notes:                           



                          Determination of                           



                          stages one and two                           



                          (with eGFR                             



                          >59mL/min/1.73 m2)                           



                          requires estimation of                           



                          kidney damage for at                           



                          least three months as                           



                          defined by structural                           



                          or functional                           



                          abnormalities of the                           



                          kidney, manifested by                           



                          either:Pathological                           



                          abnormalities or                           



                          Markers of kidney                           



                          damage (including                           



                          abnormalities in the                           



                          composition of the                           



                          blood or urine or                           



                          abnormalities in                           



                          imaging tests).                           

 

             Lab Interpretation Abnormal                               



             (test code = 47525-1)                                        



[facility]





             Test Item    Value        Reference Range Interpretation Comments

 

             LIPASE (test code = 6848056997) 35 U/L       0-220                 

    

 

             Lab Interpretation (test code = Normal                             

    



             66654-7)                                            



[facility] GLUCOSE (AUTOMATED)2023 06:55:22





             Test Item    Value        Reference Range Interpretation Comments

 

             POCT GLU (test code = 7259075356) 182 mg/dL           H      

      

 

             Lab Interpretation (test code = Abnormal                           

    



             53704-5)                                            



[facility] GLUCOSE (AUTOMATED)2023 02:46:07





             Test Item    Value        Reference Range Interpretation Comments

 

             POCT GLU (test code = 7802679894) 227 mg/dL           H      

      

 

             Lab Interpretation (test code = Abnormal                           

    



             04934-0)                                            



[id number] 00:56:40





             Test Item    Value        Reference Range Interpretation Comments

 

             CK (test code = 5480139913) 73 U/L                           

 

             Lab Interpretation (test code = Normal                             

    



             28766-1)                                            



[facility] GLUCOSE (AUTOMATED)2023 00:47:25





             Test Item    Value        Reference Range Interpretation Comments

 

             POCT GLU (test code = 7700558319) 265 mg/dL           H      

      

 

             Lab Interpretation (test code = Abnormal                           

    



             81609-0)                                            



[facility] GLUCOSE (AUTOMATED)2023-02-10 14:15:39





             Test Item    Value        Reference Range Interpretation Comments

 

             POCT GLU (test code = 1077466746) 151 mg/dL           H      

      

 

             Lab Interpretation (test code = Abnormal                           

    



             58769-9)                                            



[facility] GLUCOSE (AUTOMATED)2023-02-10 04:53:17





             Test Item    Value        Reference Range Interpretation Comments

 

             POCT GLU (test code = 1660049618) 140 mg/dL           H      

      

 

             Lab Interpretation (test code = Abnormal                           

    



             53255-2)                                            



[facility] GLUCOSE(AGE &gt;30DAYS)2023-02-10 
04:50:00





             Test Item    Value        Reference Range Interpretation Comments

 

             POCT Glu (age>30days) (test code = 140 mg/dL           A     

       



             3342)                                               

 

             Lab Interpretation (test code = Abnormal                           

    



             78959-1)                                            



[facility] GLUCOSE (AUTOMATED)2023-02-10 01:37:32





             Test Item    Value        Reference Range Interpretation Comments

 

             POCT GLU (test code = 0644759822) 191 mg/dL           H      

      

 

             Lab Interpretation (test code = Abnormal                           

    



             32831-3)                                            



[facility] GLUCOSE(AGE &gt;30DAYS)2023-02-10 
01:29:00





             Test Item    Value        Reference Range Interpretation Comments

 

             POCT Glu (age>30days) (test code = 191 mg/dL           A     

       



             3342)                                               

 

             Lab Interpretation (test code = Abnormal                           

    



             35463-8)                                            



[facility] GLUCOSE (AUTOMATED)2023-02-10 00:34:56





             Test Item    Value        Reference Range Interpretation Comments

 

             POCT GLU (test code = 8549499065) 209 mg/dL           H      

      

 

             Lab Interpretation (test code = Abnormal                           

    



             19469-3)                                            



[facility] GLUCOSE (AUTOMATED)2023 15:31:59





             Test Item    Value        Reference Range Interpretation Comments

 

             POCT GLU (test code = 9355624324) 119 mg/dL           H      

      

 

             Lab Interpretation (test code = Abnormal                           

    



             34901-4)                                            



[facility] GLUCOSE(AGE &gt;30DAYS)2023 
15:31:00





             Test Item    Value        Reference Range Interpretation Comments

 

             POCT Glu (age>30days) (test code = 119 mg/dL           A     

       



             3342)                                               

 

             Lab Interpretation (test code = Abnormal                           

    



             69780-2)                                            



[facility] GLUCOSE(AGE &gt;30DAYS)2023 
15:31:00





             Test Item    Value        Reference Range Interpretation Comments

 

             POCT Glu (age>30days) (test code = 119 mg/dL           A     

       



             3342)                                               

 

             Lab Interpretation (test code = Abnormal                           

    



             58835-8)                                            



[facility] GLUCOSE (AUTOMATED)2023 23:58:56





             Test Item    Value        Reference Range Interpretation Comments

 

             POCT GLU (test code = 187 mg/dL           H            Notifi

ed Provider



             [id number])                                         

 

             Lab Interpretation (test Abnormal                               



             code = 93535-1)                                        



[facility] GLUCOSE (AUTOMATED)2023 15:01:27





             Test Item    Value        Reference Range Interpretation Comments

 

             POCT GLU (test code = 7170300720) 268 mg/dL           H      

      

 

             Lab Interpretation (test code = Abnormal                           

    



             64716-6)                                            



[facility] HEMOGLOBIN A1C [id number] 20:00:00





             Test Item    Value        Reference Range Interpretation Comments

 

             POCT HBA1C (test code = 4548-4) 6.4 %        4-6          A        

    

 

             Lab Interpretation (test code = Abnormal                           

    



             18865-9)                                            



[facility] HEMOGLOBIN A1C [id number] 20:00:00





             Test Item    Value        Reference Range Interpretation Comments

 

             POCT HBA1C (test code = 4548-4) 6.4 %        4-6          A        

    

 

             Lab Interpretation (test code = Abnormal                           

    



             66072-7)                                            



[facility] HEMOGLOBIN A1C [id number] 20:00:00





             Test Item    Value        Reference Range Interpretation Comments

 

             POCT HBA1C (test code = 4548-4) 6.4 %        4-6          A        

    

 

             Lab Interpretation (test code = Abnormal                           

    



             56848-7)                                            



[facility] HEMOGLOBIN A1C [id number] 20:00:00





             Test Item    Value        Reference Range Interpretation Comments

 

             POCT HBA1C (test code = 4548-4) 6.4 %        4-6          A        

    

 

             Lab Interpretation (test code = Abnormal                           

    



             93029-4)                                            



[facility]





             Test Item    Value        Reference    Interpretation Comments



                                       Range                     

 

             TROPONIN I (test 0.003 ng/mL  See_Comment                [Automated



             code = 8843622380)                                        message] 

The



                                                                 system which



                                                                 generated this



                                                                 result



                                                                 transmitted



                                                                 reference range

:



                                                                 <=0.034. The



                                                                 reference range



                                                                 was not used to



                                                                 interpret this



                                                                 result as



                                                                 normal/abnormal

.

 

             ARVIND (test code = Reference (Normal)                           



             ARVIND)         Range (defined by                           



                          the 99th percentile                           



                          reference limit): <=                           



                          0.034 ng/mL Note:                           



                          Cardiac troponin                           



                          begins to rise 3-4                           



                          hours after the                           



                          onset of ischemia.                           



                          Repeat in 4-6 hours                           



                          if the sample was                           



                          drawn within 3-4                           



                          hours of the onset                           



                          of the symptom and                           



                          found normal.                           



                          Diagnosis of                           



                          myocardial injury is                           



                          made with acute                           



                          changes in cTn                           



                          concentrations with                           



                          at least one serial                           



                          sample above the                           



                          99th percentile                           



                          upper reference                           



                          limit (URL), taken                           



                          together with the                           



                          patient's clinical                           



                          presentation. Biotin                           



                          has been reported to                           



                          cause a negative                           



                          bias, interpret                           



                          results relative to                           



                          patient's use of                           



                          biotin.                                

 

             Lab Interpretation Normal                                 



             (test code =                                        



             04037-3)                                            



[id number]TOTAL CHOLESTEROL, 
TRIGLYCERIDES, HDL)2022 15:24:00





             Test Item    Value        Reference Range Interpretation Comments

 

             CHOL (test code = 167 mg/dL    120-200                   



             4277267937)                                         

 

             HDL (test code = 70 mg/dL     See_Comment                [Automated

 message]



             3347033060)                                         The system [employer]



                                                                 generated this



                                                                 result transmit

sha



                                                                 reference range

:



                                                                 >=40. The refer

ence



                                                                 range was not u

sed



                                                                 to interpret th

is



                                                                 result as



                                                                 normal/abnormal

.

 

             HDLC RATIO (test code =              See_Comment                [Au

tomated message]



             2124525538)                                         The system [employer]



                                                                 generated this



                                                                 result transmit

sha



                                                                 reference range

:



                                                                 <=5.0. The refe

rence



                                                                 range was not u

sed



                                                                 to interpret th

is



                                                                 result as



                                                                 normal/abnormal

.

 

             TRIG (test code = 57 mg/dL                         



             7884888967)                                         

 

             LDL CHOL (test code = 86 mg/dL     See_Comment                [Auto

mated message]



             14180-0)                                            The system [employer]



                                                                 generated this



                                                                 result transmit

sah



                                                                 reference range

:



                                                                 <=160. The refe

rence



                                                                 range was not u

sed



                                                                 to interpret th

is



                                                                 result as



                                                                 normal/abnormal

.

 

             VLDL (test code = 11 mg/dL     5-60                      



             1741628728)                                         

 

             Lab Interpretation (test Normal                                 



             code = 71473-8)                                        



[facility] 15:23:40





             Test Item    Value        Reference Range Interpretation Comments

 

             MAGNESIUM (test code = 3338272927) 1.5 mg/dL    1.7-2.4      L     

       

 

             Lab Interpretation (test code = Abnormal                           

    



             08111-9)                                            



[facility]. METABOLIC PANEL (03987)2022 
15:23:35





             Test Item    Value        Reference Range Interpretation Comments

 

             NA (test code = 137 mmol/L   135-145                   



             3699993654)                                         

 

             K (test code = 4.4 mmol/L   3.5-5.0                   



             6357302607)                                         

 

             CL (test code = 100 mmol/L                       



             4017205234)                                         

 

             CO2 TOTAL (test code = 24 mmol/L    23-31                     



             5320972993)                                         

 

             AGAP (test code =              2-16                      



             1621694707)                                         

 

             BUN (test code = 23 mg/dL     7-23                      



             3299795833)                                         

 

             GLUCOSE (test code = 200 mg/dL           H            



             1885745034)                                         

 

             CREATININE (test code = 1.00 mg/dL   0.60-1.25                 



             4058563099)                                         

 

             TOTAL BILI (test code = 0.9 mg/dL    0.1-1.1                   



             3839642651)                                         

 

             CALCIUM (test code = 9.3 mg/dL    8.6-10.6                  



             7497309905)                                         

 

             T PROTEIN (test code = 7.5 g/dL     6.3-8.2                   



             0213716101)                                         

 

             ALBUMIN (test code = 5.1 g/dL     3.5-5.0      H            



             6811582288)                                         

 

             ALK PHOS (test code = 53 U/L                           



             0841395300)                                         

 

             ALTv (test code = 23 U/L       5-50                      



             1742-6)                                             

 

             AST(SGOT) (test code = 19 U/L       13-40                     



             0831520953)                                         

 

             eGFR (test code =              mL/min/1.73m2              



             4069365779)                                         

 

             ARVIND (test code = ARVIND) Association of                           



                          Glomerular Filtration                           



                          Rate (GFR) and Staging                           



                          of Kidney Disease*                           



                          +---------------------                           



                          --+-------------------                           



                          --+-------------------                           



                          ------+| GFR                           



                          (mL/min/1.73 m2) ?|                           



                          With Kidney Damage ?|                           



                          ?Without Kidney                           



                          Damage+---------------                           



                          --------+-------------                           



                          --------+-------------                           



                          ------------+| ?>90 ?                           



                          ? ? ? ? ? ? ? ?|                           



                          ?Stage one ? ? ? ? ?|                           



                          ? Normal ? ? ? ? ? ? ?                           



                          ?+--------------------                           



                          ---+------------------                           



                          ---+------------------                           



                          -------+| ?60-89 ? ? ?                           



                          ? ? ? ? ?| ?Stage two                           



                          ? ? ? ? ?| ? Decreased                           



                          GFR ? ? ? ?                            



                          +---------------------                           



                          --+-------------------                           



                          --+-------------------                           



                          ------+| ?30-59 ? ? ?                           



                          ? ? ? ? ?| ?Stage                           



                          three ? ? ? ?| ? Stage                           



                          three ? ? ? ? ?                           



                          +---------------------                           



                          --+-------------------                           



                          --+-------------------                           



                          ------+| ?15-29 ? ? ?                           



                          ? ? ? ? ?| ?Stage four                           



                          ? ? ? ? | ? Stage four                           



                          ? ? ? ? ?                              



                          ?+--------------------                           



                          ---+------------------                           



                          ---+------------------                           



                          -------+| ?<15 (or                           



                          dialysis) ? ?| ?Stage                           



                          five ? ? ? ? | ? Stage                           



                          five ? ? ? ? ?                           



                          ?+--------------------                           



                          ---+------------------                           



                          ---+------------------                           



                          -------+ *Each stage                           



                          assumes the associated                           



                          GFR level has been in                           



                          effect for at least                           



                          three months. ?Stages                           



                          1 to 5, with or                           



                          without kidney                           



                          disease, indicate                           



                          chronic kidney                           



                          disease. Notes:                           



                          Determination of                           



                          stages one and two                           



                          (with eGFR                             



                          >59mL/min/1.73 m2)                           



                          requires estimation of                           



                          kidney damage for at                           



                          least three months as                           



                          defined by structural                           



                          or functional                           



                          abnormalities of the                           



                          kidney, manifested by                           



                          either:Pathological                           



                          abnormalities or                           



                          Markers of kidney                           



                          damage (including                           



                          abnormalities in the                           



                          composition of the                           



                          blood or urine or                           



                          abnormalities in                           



                          imaging tests).                           

 

             Lab Interpretation Abnormal                               



             (test code = 66448-1)                                        



[facility] 15:22:59





             Test Item    Value        Reference Range Interpretation Comments

 

             LIPASE (test code = 4193917576) 30 U/L       0-220                 

    

 

             Lab Interpretation (test code = Normal                             

    



             48367-7)                                            



[facility] WITH [id number] 15:10:18





             Test Item    Value        Reference Range Interpretation Comments

 

             WBC (test code =              See_Comment                [Automated



             5885-2)                                             message] The sy

stem



                                                                 which generated



                                                                 this result



                                                                 transmitted



                                                                 reference range

:



                                                                 4.20 - 10.70



                                                                 10*3/?L. The



                                                                 reference range

 was



                                                                 not used to



                                                                 interpret this



                                                                 result as



                                                                 normal/abnormal

.

 

             RBC (test code =              See_Comment                [Automated



             215-8)                                              message] The sy

stem



                                                                 which generated



                                                                 this result



                                                                 transmitted



                                                                 reference range

:



                                                                 4.26 - 5.52



                                                                 10*6/?L. The



                                                                 reference range

 was



                                                                 not used to



                                                                 interpret this



                                                                 result as



                                                                 normal/abnormal

.

 

             HGB (test code = 14.3 g/dL    12.2-16.4                 



             718-7)                                              

 

             HCT (test code = 41.6 %       38.4-49.3                 



             4544-3)                                             

 

             MCV (test code = 84.4 fL      81.7-95.6                 



             787-2)                                              

 

             MCH (test code = 29.0 pg      26.1-32.7                 



             785-6)                                              

 

             MCHC (test code = 34.4 g/dL    31.2-35.0                 



             786-4)                                              

 

             RDW-SD (test code = 38.2 fL      38.5-51.6    L            



             51815-6)                                            

 

             RDW-CV (test code = 12.6 %       12.1-15.4                 



             788-0)                                              

 

             PLT (test code =              See_Comment                [Automated



             777-3)                                              message] The sy

stem



                                                                 which generated



                                                                 this result



                                                                 transmitted



                                                                 reference range

:



                                                                 150 - 328 10*3/

?L.



                                                                 The reference r

kana



                                                                 was not used to



                                                                 interpret this



                                                                 result as



                                                                 normal/abnormal

.

 

             MPV (test code = 9.5 fL       9.8-13.0     L            



             01768-4)                                            

 

             NRBC/100 WBC (test              See_Comment                [Automat

ed



             code = 4423373971)                                        message] 

The system



                                                                 which generated



                                                                 this result



                                                                 transmitted



                                                                 reference range

:



                                                                 0.0 - 10.0 /100



                                                                 WBCs. The refer

ence



                                                                 range was not u

sed



                                                                 to interpret th

is



                                                                 result as



                                                                 normal/abnormal

.

 

             NRBC x10^3 (test code              See_Comment                [Auto

mated



             = [id number])                                        message] The s

ystem



                                                                 which generated



                                                                 this result



                                                                 transmitted



                                                                 reference range

:



                                                                 10*3/?L. The



                                                                 reference range

 was



                                                                 not used to



                                                                 interpret this



                                                                 result as



                                                                 normal/abnormal

.

 

             GRAN MAT (NEUT) % 88.1 %                                 



             (test code = 770-8)                                        

 

             IMM GRAN % (test code 0.40 %                                 



             = [id number])                                        

 

             LYMPH % (test code = 3.6 %                                  



             736-9)                                              

 

             MONO % (test code = 7.6 %                                  



             5905-5)                                             

 

             EOS % (test code = 0.0 %                                  



             713-8)                                              

 

             BASO % (test code = 0.3 %                                  



             706-2)                                              

 

             GRAN MAT x10^3(ANC) 8.08 10*3/uL 1.99-6.95    H            



             (test code =                                        



             4777200570)                                         

 

             IMM GRAN x10^3 (test 0.04 10*3/uL 0.00-0.06                 



             code = 3853179360)                                        

 

             LYMPH x10^3 (test code 0.33 10*3/uL 1.09-3.23    L            



             = 731-0)                                            

 

             MONO x10^3 (test code 0.70 10*3/uL 0.36-1.02                 



             = 742-7)                                            

 

             EOS x10^3 (test code =              0.06-0.53    L            



             711-2)                                              

 

             BASO x10^3 (test code 0.03 10*3/uL 0.01-0.09                 



             = 704-7)                                            

 

             Lab Interpretation Abnormal                               



             (test code = 40673-0)                                        



[facility] HEMOGLOBIN A1C TEST2022-10-28 17:36:00





             Test Item    Value        Reference Range Interpretation Comments

 

             POCT HBA1C (test code = 4548-4) 8.5 %        4-6          A        

    

 

             Lab Interpretation (test code = Abnormal                           

    



             17091-9)                                            



[facility] HEMOGLOBIN A1C TEST2022-10-28 17:36:00





             Test Item    Value        Reference Range Interpretation Comments

 

             POCT HBA1C (test code = 4548-4) 8.5 %        4-6          A        

    

 

             Lab Interpretation (test code = Abnormal                           

    



             24781-5)                                            



[facility] HEMOGLOBIN A1C TEST2022-10-28 17:36:00





             Test Item    Value        Reference Range Interpretation Comments

 

             POCT HBA1C (test code = 4548-4) 8.5 %        4-6          A        

    

 

             Lab Interpretation (test code = Abnormal                           

    



             19450-4)                                            



[facility] HEMOGLOBIN A1C TEST2022-10-28 17:36:00





             Test Item    Value        Reference Range Interpretation Comments

 

             POCT HBA1C (test code = 4548-4) 8.5 %        4-6          A        

    

 

             Lab Interpretation (test code = Abnormal                           

    



             91819-2)                                            



[facility] HEMOGLOBIN A1C TEST2022-10-28 17:36:00





             Test Item    Value        Reference Range Interpretation Comments

 

             POCT HBA1C (test code = 4548-4) 8.5 %        4-6          A        

    

 

             Lab Interpretation (test code = Abnormal                           

    



             27232-4)                                            



[facility] HEMOGLOBIN A1C TEST2022-10-28 17:36:00





             Test Item    Value        Reference Range Interpretation Comments

 

             POCT HBA1C (test code = 4548-4) 8.5 %        4-6          A        

    

 

             Lab Interpretation (test code = Abnormal                           

    



             43637-6)                                            



[facility]



Notes







                Date/Time       Note            Provider        Source

 

                    2023          Formatting of this note might be differe

nt from the original. 

Patti Orellana                        [facility]



                          19:11:32-00:00            Pt given printed and verbal 

discharge instructions regarding 

closed displaced fracture of distal phalanx of left great toe and foot injury, 
encouraged hydration,     RN                        



                                                                



                                0 Prescriptions provided                 



                                                                



                                                    Pt verbalized understanding 

of instructions, pt awake alert oriented, resp reg 

unlabored, skin w/d, color appropriate for race, moves all ext well,pt 
encouraged to follow up with pcp                           



                                                                



                                Advised to seek medical attention for new/prolon

ged/worsening of symptoms,                 



                                Symptoms improved.                 



                                                                



                                                    Awake, alert oriented, resp 

reg unlabored, skin w/d, pt leaving amb with steady

gait, in no apparent distress,                           



                                                                



                                Electronically signed by Patti Orellana RN a t 2023 7:12 PM CDT                 

 

                    2023          Formatting of this note might be differe

nt from the original. 

Dana Fowler                         [facility]



                16:24:57-00:00  Couch fell on patient's left foot ~1600hrs. RN  

            



                                Pulse, motor, sensory present and normal to left

 foot.                 



                                Blue toe nail on big toe noted, no obvious defor

mity noted.                 



                                Hx - pancreatitis, DM, schizophrenia            

     



                                Electronically signed by Dana Fowler RN at

 2023 4:26 PM CDT                 

 

                2023      Formatting of this note might be different from 

the original.                 [facility]



                          10:51:05-00:00            Double insulin prescriptions

 on file. Clarified that pt will only

be on NPH 70-30 12 units bid.                           



                                Electronically signed by Chastity Solorzano MD at

 2023 10:51 AM T                 

 

                    2023          Formatting of this note might be differe

nt from the original. 

Ceci GIRALDO Haworth                        [facility]



                          11:23:07-00:00            RECEIVED RX FOR HUMULIN N PH

[facility] WANTS TO KNOW IF YOU WANT 

PATIENT TO TAKE THIS AND HUMULIN 70/30. PLEASE CALL [phone].               

            



                                Electronically signed by Ceci Garcia at  11:26 AM CDT                 

 

                    2023          Formatting of this note might be differe

nt from the original. Amara Diaz                             [facility]



                          22:50:33-00:00            Pt given printed and verbal 

discharge instructions regarding 

chest pain, nausea & vomiting, encouraged hydration, RN                        



                                                                



                                Prescriptions provided: Zofran                 



                                                                



                                Instructed to stop taking Mobic                 



                                                                



                                                    Pt verbalized understanding 

of instructions, pt awake alert oriented, resp reg 

unlabored, skin w/d, color appropriate for race, moves all ext well,pt 
encouraged to follow up with pcp                           



                                                                



                                Advised to seek medical attention for new/prolon

ged/worsening of symptoms,                 



                                Symptoms improved                 



                                                                



                                No adverse reaction to meds given in ER noted up

on discharge                 



                                                                



                                PIV d'cd, dressing to site, catheter in tact.   

              



                                                                



                                                    Awake, alert oriented, resp 

reg unlabored, skin w/d, pt leaving amb with steady

gait, in no apparent distress,                           



                                Electronically signed by Amara Diaz RN 

at 2023 10:52 PM CDT                 

 

                2023      Formatting of this note might be different from 

the original.                 [facility]



                          19:50:41-00:00            Pt arrived ambulatory with c

omplaints of chest pain, back pain, 

abdominal pain and nausea that started at 6pm. Pt took 500mg Tylenol PTA.       

                    



                                                                



                                Hx: Pancreatitis, schizophrenia                 



                                Electronically signed by Emerald Jose RN at 

2023 7:53 PM CDT                 

 

                2023      Associated Order(s): EKG-12 Lead ROUTINE ONCE   

              [facility]



                19:29:00-00:00  Pre-Procedure Diagnose(s): Chest pain, unspecifi

ed type                 



                                Post-Procedure Diagnose(s): Chest pain, unspecif

ied type                 



                                Formatting of this note is different from the or

iginal.                 



                                [facility] Emergency Department Note                 



                                                                



                                Patient Name: Mc Bowles         

        



                                YOB: 1990 33 year old male       

          



                                Treatment Room: [place] [address]             

    



                                Medical Record Number: [id number]                 



                                Primary Care Physician: Chastity Solorzano         

        



                                                                



                                Patient Escorted by: Family [5]                 



                                Mode of Arrival: Personal means [1]             

    



                                EMS Treatment Prior to ED Arrival:              

   



                                                                



                                                                



                                                                



                                Travel and Exposure Screening:                 



                                                                



                                Symptoms                        



                                Does patient have any of these symptoms?: (not r

ecorded)                 



                                                                



                                Exposure Screening                 



                                                    Has patient had contact with

 someone with a communicable disease in the last 

month?: (not recorded)                              



                                Diseases exposed to:: (not recorded)            

     



                                Is Patient Pregnant?: (not recorded)            

     



                                Exposure Date: (not recorded)                 



                                                                



                                Chief Complaint:                 



                                Chief Complaint                 



                                Patient presents with                 



                                 Back Pain                      



                                 Chest Pain                     



                                 Abdominal Pain                 



                                                                



                                                                



                                History of Present Illness:                 



                                                    The patient presents for erendira

l for chest pain and abdominal pain that started 

around 1800 today. The pain has been constant and is not radiating to his back. 
Nausea but no vomiting. No fevers. No cough o                           



                                                    r URI sx. He reports he was 

outside smoking when his sx started. Has h/o 

pancreatitis and this feels like it. No etoh in 6 years. No h/o abdominal 
surgery in the past. Has h/o DM and htn. No history of CAD in himself or his 
family.                                             



                                Here for evaluation.                 



                                                                



                                Past Medical History/Immunizations:             

    



                                Past Medical History:                 



                                Diagnosis Date                  



                                 ADHD                           



                                 Alcohol use 2021                 



                                 Anxiety                        



                                 Cyst and pseudocyst of pancreas 2021     

            



                                 Added automatically from request for surgery 76

1908                 



                                 Depression                     



                                 Diabetes mellitus                 



                                 Hypertriglyceridemia 2021                

 



                                 Necrotizing pancreatitis 2021            

     



                                 Added automatically from request for surgery 78

1908                 



                                 Obesity                        



                                 Pancreatitis 2020                 



                                 Pseudocyst of pancreas 2021              

   



                                 Schizophrenia, unspecified                 



                                 Seizures                       



                                 Weight loss, unintentional 2021          

       



                                                                



                                Tetanus received in last 5 years: Unknown       

          



                                                                



                                                                



                                Allergies:                      



                                Allergies                       



                                Allergen Reactions                 



                                 Pcn [Penicillins] Hives                 



                                                                



                                Past Social History:                 



                                Tobacco Use                     



                                                                



                                 Every Day; 1.00 packs/day; Types: Cigarettes   

              



                                 Smokeless Tobacco: Never used smokeless tobacco

.                 



                                 Comments: Delta 8                 



                                                                



                                                                



                                                                



                                Vaping Use                      



                                                                



                                 Never assessed                 



                                                                



                                                                



                                                                



                                Alcohol Use                     



                                                                



                                 Not Currently.                 



                                 Comments: 6 pack of beer every day since 17 yea

rs old. no liquor                 



                                                                



                                                                



                                                                



                                Drug Use                        



                                                                



                                 Yes; Cocaine.                  



                                 Comments: From [age] years old.                

 



                                                                



                                                                



                                                                



                                Sexual Activity                 



                                                                



                                 Not currently sexually active.                 



                                                                



                                                                



                                                                



                                Past Surgical History:                 



                                Past Surgical History:                 



                                Procedure Laterality Date                 



                                 ENDOSCOPIC RETROGRADE CHOLANGIOPANCRETOGRAPHY N

/A 2021                 



                                 Surgeon: Eamon Simon MD; Location: [facility]

 ([facility] OR Location                 



                                 ENDOSCOPIC RETROGRADE CHOLANGIOPANCRETOGRAPHY N

/A 2021                 



                                 Surgeon: Eamon Simon MD; [facility]                 



                                 ESOPHAGOGASTRODUODENOSCOPY N/A 2021       

          



                                 Surgeon: Eamon Simon MD; Location: [facility]

 ([facility] Location                 



                                 ESOPHAGOGASTRODUODENOSCOPY N/A 2021       

          



                                 Surgeon: Eamon Simon MD; Location: [facility]                 



                                 UPPER ULTRASOUND (SHX) N/A 2/10/2021           

      



                                 Surgeon: Eamon Simon MD; Location: [facility]

 ([facility] OR Location                 



                                 UPPER ULTRASOUND (SHX) N/A 2021           

      



                                 Surgeon: Eamon Simon MD; Location: [facility]

 ([facility] OR Location                 



                                 UPPER ULTRASOUND (SHX) N/A 2021           

      



                                 Surgeon: Eamon Simon MD; Location: [facility]

[facility] OR Location                 



                                                                



                                Review of Systems:                 



                                Review of Systems                 



                                Constitutional: Negative for chills and fever.  

               



                                Respiratory: Negative for cough and shortness of

 breath.                 



                                Cardiovascular: Positive for chest pain.        

         



                                                    Gastrointestinal: Positive f

or abdominal pain and nausea. Negative for 

vomiting.                                           



                                Genitourinary: Negative for dysuria.            

     



                                Musculoskeletal: Negative for arthralgias, neck 

pain and neck stiffness.                 



                                Skin: Negative for wound.                 



                                Neurological: Negative for dizziness.           

      



                                Psychiatric/Behavioral: Negative for agitation. 

                



                                Endocrine: Negative for goiter.                 



                                                                



                                Physical Exam:                  



                                ED Triage Vitals [23]                

 



                                Weight 81.4 kg (179 lb 6.4 oz)                 



                                Actual or estimated Actual                 



                                Height 1.753 m (5' 9")                 



                                BP (!) 140/84                   



                                Pulse 97                        



                                Resp 18                         



                                Temp 36.8 ?C (98.3 ?F)                 



                                Temp source Oral                 



                                SpO2 96 %                       



                                Measured on Room air                 



                                                                



                                Physical Exam                   



                                Vitals and nursing note reviewed.               

  



                                Constitutional:                 



                                 Appearance: Normal appearance.                 



                                HENT:                           



                                 Head: Normocephalic and atraumatic.            

     



                                 Nose: Nose normal.                 



                                  Mouth/Throat:                 



                                 Mouth: Mucous membranes are dry.               

  



                                Cardiovascular:                 



                                 Rate and Rhythm: Normal rate and regular rhythm

.                 



                                 Pulses: Normal pulses.                 



                                Pulmonary:                      



                                 Effort: Pulmonary effort is normal. No respirat

ory distress.                 



                                 Breath sounds: No stridor. No wheezing or rhonc

hi.                 



                                Abdominal:                      



                                 General: There is no distension.               

  



                                 Palpations: Abdomen is soft. There is no mass. 

                



                                 Tenderness: There is no abdominal tenderness. T

here is no guarding.                 



                                 Hernia: No hernia is present.                 



                                Musculoskeletal:                 



                                 Cervical back: Normal range of motion and neck 

supple.                 



                                Skin:                           



                                 General: Skin is warm and dry.                 



                                Neurological:                   



                                 General: No focal deficit present.             

    



                                 Mental Status: He is alert and oriented to pers

on, place, and time.                 



                                                                



                                Radiology:                      



                                XR CHEST 1 VW                   



                                Final Result                    



                                ORDERING PHYSICIAN: TRACY RUDD             

    



                                                                



                                HISTORY: chest pain                 



                                                                



                                COMPARISON: none                 



                                                                



                                FINDINGS:                       



                                                                



                                Single frontal view of the chest.               

  



                                                                



                                Heart is normal in size. There is no pulmonary e

asha. There are no focal                 



                                areas of consolidation. There is no pneumothorax

. There are no pleural                 



                                effusions. Osseous structures are unremarkable. 

                



                                                                



                                Please note that chest radiography is not a sens

itive modality for the                 



                                detection of masses.                 



                                                                



                                                                



                                IMPRESSION                      



                                                                



                                No radiographic evidence of acute cardiopulmonar

y process.                 



                                                                



                                RL: 135                         



                                                                



                                Electronically signed by Casandra Gerard MD at 8/

3/2023 9:27 PM                 



                                                                



                                                                



                                Lab Results:                    



                                Lab Results                     



                                CBC WITH DIFF - Abnormal                 



                                 Result Value Ref Range                 



                                 WBC 8.78 4.20 - 10.70 10*3/?L                 



                                 RBC 3.81 (*) 4.26 - 5.52 10*6/?L               

  



                                 HGB 11.6 (*) 12.2 - 16.4 g/dL                 



                                 HCT 33.5 (*) 38.4 - 49.3 %                 



                                 MCV 87.9 81.7 - 95.6 fL                 



                                 MCH 30.4 26.1 - 32.7 pg                 



                                 MCHC 34.6 31.2 - 35.0 g/dL                 



                                 RDW-SD 39.3 38.5 - 51.6 fL                 



                                 RDW-CV 12.3 12.1 - 15.4 %                 



                                  150 - 328 10*3/?L                 



                                 MPV 9.7 (*) 9.8 - 13.0 fL                 



                                 NRBC/100 WBC 0.0 0.0 - 10.0 /100 WBCs          

       



                                 NRBC x10^3 <0.01 10*3/?L                 



                                 GRAN MAT (NEUT) % 58.4 %                 



                                 IMM GRAN % 0.30 %                 



                                 LYMPH % 33.3 %                 



                                 MONO % 6.7 %                   



                                 EOS % 1.0 %                    



                                 BASO % 0.3 %                   



                                 GRAN MAT x10^3(ANC) 5.12 1.99 - 6.95 10*3/uL   

              



                                 IMM GRAN x10^3 0.03 0.00 - 0.06 10*3/uL        

         



                                 LYMPH x10^3 2.92 1.09 - 3.23 10*3/uL           

      



                                 MONO x10^3 0.59 0.36 - 1.02 10*3/uL            

     



                                 EOS x10^3 0.09 0.06 - 0.53 10*3/uL             

    



                                 BASO x10^3 0.03 0.01 - 0.09 10*3/uL            

     



                                COMP. METABOLIC PANEL (62102) - Abnormal        

         



                                  135 - 145 mmol/L                 



                                 K 4.2 3.5 - 5.0 mmol/L                 



                                  98 - 108 mmol/L                 



                                 CO2 TOTAL 27 23 - 31 mmol/L                 



                                 AGAP 8 2 - 16                  



                                 BUN 9 7 - 23 mg/dL                 



                                 GLUCOSE 169 (*) 70 - 110 mg/dL                 



                                 CREATININE 0.87 0.60 - 1.25 mg/dL              

   



                                 TOTAL BILI 0.4 0.1 - 1.1 mg/dL                 



                                 CALCIUM 9.1 8.6 - 10.6 mg/dL                 



                                 T PROTEIN 7.0 6.3 - 8.2 g/dL                 



                                 ALBUMIN 4.3 3.5 - 5.0 g/dL                 



                                 ALK PHOS 37 34 - 122 U/L                 



                                 ALTv 18 5 - 50 U/L                 



                                 AST(SGOT) 22 13 - 40 U/L                 



                                 eGFR 99.9 mL/min/1.73m2                 



                                LIPASE - Normal                 



                                 LIPASE 49 0 - 220 U/L                 



                                TROPONIN I - Normal                 



                                 TROPONIN I 0.003 <=0.034 ng/mL                 



                                MAGNESIUM - Normal                 



                                 MAGNESIUM 1.7 1.7 - 2.4 mg/dL                 



                                COVID-19 (ID NOW RAPID TESTING) - Normal        

         



                                 SARS-CoV-2 Rapid ID NOW Not Detected Not Detect

ed                 



                                                                



                                EKG:                            



                                If EKG completed, see Procedure Note.           

      



                                                                



                                Orders and Treatments:                 



                                Orders Placed This Encounter                 



                                Procedures                      



                                 XR CHEST 1 VW                  



                                 CBC WITH DIFF                  



                                 COMP. METABOLIC PANEL (40356)                 



                                 LIPASE                         



                                 TROPONIN I                     



                                 MAGNESIUM                      



                                 COVID-19 (ID NOW TESTING)                 



                                 LAB ONLY COVID INTERPRETATION                 



                                                                



                                Orders Placed This Encounter                 



                                Medications                     



                                 ondansetron (ZOFRAN (PF)) injection 4 mg       

          



                                 NaCl 0.9% (NS) bolus infusion 1,000 mL         

        



                                 ondansetron 4 mg disintegrating tablet         

        



                                                                



                                First Provider Eval:                 



                                ED Events                       



                                                                



                                 Date/Time Event User Comments                 



                                 23 Medical Screening Begins TRACY RUDD DO --                 



                                 23 First Provider Evaluation TRACY ROWLAND DO --                 



                                                                



                                                                



                                                                



                                No notes of EC Admission Criteria type on file. 

                



                                                                



                                ED COURSE                       



                                Diagnosis/Impression as of 23 2235        

         



                                Chest pain, unspecified type                 



                                Nausea and vomiting, unspecified vomiting type  

               



                                                                



                                Procedures:                     



                                EKG-12 Lead ROUTINE ONCE                 



                                                                



                                Date/Time: 8/3/2023 8:56 PM                 



                                                                



                                Performed by: Tracy Rudd DO            

     



                                Authorized by: Tracy Rudd DO           

      



                                ECG reviewed by ED Physician in the absence of a

 cardiologist: yes                 



                                Interpretation:                 



                                 Interpretation: normal                 



                                Rate:                           



                                 ECG rate: 92                   



                                 ECG rate assessment: normal                 



                                Rhythm:                         



                                 Rhythm: sinus rhythm                 



                                Ectopy:                         



                                 Ectopy: none                   



                                QRS:                            



                                 QRS axis: Normal                 



                                 QRS intervals: Normal                 



                                 QRS conduction: normal                 



                                ST segments:                    



                                 ST segments: Normal                 



                                T waves:                        



                                 T waves: normal                 



                                Q waves:                        



                                 Abnormal Q-waves: not present                 



                                                                



                                MDM:                            



                                Medical Decision Making                 



                                                    The patient presents from [facility] for evaluation for chest pain and abdominal pain

started around 6 PM this evening while he was outside having a cigarette. He 
reports his symptoms have been constant since                           



                                                     then but none of the pain r

adiates to his back. No change in his symptoms with

taking a big deep breath, exerting himself or eating and drinking. He has 
complained of some nausea but no vomiting. No di                           



                                                    arrhea. No medications for s

ymptoms. No cough or congestion. No shortness of 

breath. He does have a history of pancreatitis in the past and reports this 
feels like it. His pancreatitis was due to alcoho                           



                                                    l abuse and he reports his l

ast alcoholic beverage was 6 years ago. He has a 

history of high blood pressure and diabetes. No history of CAD in himself or the
family.                                             



                                Vital signs are stable in the ER.               

  



                                His heart is regular rhythm.                 



                                His lungs are clear bilaterally.                

 



                                His abdomen is obese but soft and nontender.    

             



                                His EKG shows a normal sinus rhythm, no STEMI.  

               



                                The patient's presentation is atypical for ACS a

nd he is low risk.                 



                                                    Differential diagnosis inclu

hemalatha pancreatitis, ACS, heat exhaustion, GERD, 

peptic ulcer disease                                



                                We will check laboratory studies including a tro

ponin and a lipase.                 



                                We will give the patient pain medication here in

 the ER.                 



                                We will continue to monitor the patient here in 

the ER.                 



                                Final disposition pending.                 



                                                                



                                 -the patient is doing well in the ER.      

           



                                He is feeling much better after the Zofran and I

V fluids.                 



                                                    His laboratory studies are u

nremarkable including a normal troponin and a 

normal lipase level.                                



                                                    He was given a p.o. challeng

e here in the ER and able to tolerate by mouth 

without difficulty.                                 



                                                    He remained stable here in [facility] ER and is okay for discharge home with PCP 

follow-up.                                          



                                Recommend Zofran every 8 hours as needed as well

 as the BRAT diet.                 



                                                                



                                Problems Addressed:                 



                                Chest pain, unspecified type: acute illness or i

njury                 



                                Nausea and vomiting, unspecified vomiting type: 

acute illness or injury                 



                                                                



                                Amount and/or Complexity of Data Reviewed       

          



                                Labs: ordered. Decision-making details documente

d in ED Course.                 



                                                    Radiology: ordered and indep

endent interpretation performed. Decision-making 

details documented in ED Course.                           



                                                    ECG/medicine tests: ordered 

and independent interpretation performed. Decision-

making details documented in ED Course.                           



                                                                



                                Risk                            



                                Prescription drug management.                 



                                                                



                                                                



                                Flowsheet Documentation:                 



                                                                



                                                                



                                                                



                                                                



                                                                



                                                                



                                                                



                                                                



                                                                



                                                                



                                                                



                                                                



                                                                



                                                                



                                                                



                                                                



                                                                



                                                                



                                                                



                                                                



                                                                



                                                                



                                                                



                                                                



                                                                



                                                                



                                                                



                                                                



                                                                



                                                                



                                                                



                                                                



                                                                



                                                                



                                                                



                                                                



                                                                



                                                                



                                                                



                                                                



                                                                



                                                                



                                                                



                                                                



                                                                



                                                                



                                                                



                                                                



                                                                



                                                                



                                                                



                                                                



                                                                



                                                                



                                Scoring Tools:                  



                                                                



                                                                



                                                                



                                                                



                                No data recorded                 



                                                                



                                                                



                                                                



                                                                



                                                                



                                                                



                                                                



                                                                



                                                                



                                                                



                                                                



                                Disposition/Condition:                 



                                ED Disposition                  



                                                                



                                 ED Disposition                 



                                Disch - Home                    



                                 Condition                      



                                Stable                          



                                 Comment                        



                                --                              



                                                                



                                                                



                                                                



                                                                



                                Discharge Medications:                 



                                Patient's Medications                 



                                START taking these medications                 



                                                     ONDANSETRON 4 MG DISINTEGRA

TING TABLET Take 1 tablet by mouth every 8 (eight) 

hours as needed for Nausea and Vomiting (N/V).                           



                                CONTINUE taking these medications which have NOT

 CHANGED                 



                                                     ACETAMINOPHEN (TYLENOL ARTH

RITIS PAIN) 650 MG CR TABLET Take 1 tablet by mouth

every 8 (eight) hours as needed for Pain.                           



                                 BLOOD GLUCOSE STRIP-DISP METER KIT Use as direc

sha                 



                                 BLOOD SUGAR DIAGNOSTIC STRIP Use as directed   

              



                                                     BLOOD-GLUCOSE METER,CONTINU

OUS (DEXCOM G4 -SHARE KIT) MISC Use as 

directed                                            



                                         FENOFIBRATE MICRONIZED 67 MG CAPSULE Ta

ke 2 capsules by mouth in the morning. 

                                        



                                                     INSULIN NPH-REGULAR HUMAN R

 UNIT/ML (70-30) INJECTION inject 12 Units 

under the skin in the morning and 12 Units in the evening.                      

     



                                 LANCETS 23 GAUGE MISC Use as directed          

       



                                                     LIPASE-PROTEASE-AMYLASE (CR

AMNA) 36,000-114,000- 180,000 UNIT CPDR Take 1 

capsules by mouth with meals and 1 with each snack.                           



                                                     METFORMIN 1,000 MG TABLET T

tanvi 1 tablet by mouth in the morning and 1 tablet 

in the evening. Take with meals.                           



                                                     OFLOXACIN 0.3 % OTIC DROPS 

Place 5 Drops in both ears in the morning and 5 

Drops in the evening.                               



                                                     OMEGA-3 FATTY ACIDS CAPSULE

 Take 1 capsule by mouth 3 (three) times daily with

meals.                                              



                                 PANTOPRAZOLE 40 MG EC TABLET Take 1 tablet by m

outh in the morning.                 



                                 SYRINGE, DISPOSABLE, 3 ML SYRG Use as directed 

                



                                START taking Modified Medications as Prescribed 

                



                                 No medications on file                 



                                STOP taking these medications                 



                                 MELOXICAM 15 MG TABLET Take 1 tablet by mouth i

n the morning.                 



                                                     ONDANSETRON 4 MG DISINTEGRA

TING TABLET Take 1 tablet by mouth every 8 (eight) 

hours as needed for Nausea and Vomiting (N/V).                           



                                                     TRAMADOL 50 MG TABLET Take 

1 tablet by mouth every 6 (six) hours as needed 

(pain). Indications: acute pain                           



                                                                



                                Follow-up:                      



                                                                



                                Electronically signed by:                 



                                                                



                                                                



                                Tracy Rudd DO                 



                                [phone]                   



                                                                



                                Electronically signed by Tracy Rudd DO 

at 2023 10:35 PM CDT                 

 

                2023      Formatting of this note is different from the or

iginal.                 [facility]



                14:45:00-00:00  Images from the original note were not included.

                 



                                                    Patient has been identified 

by  and name and was provided with cup, 

antiseptic towelette, and clean catch instructions. 1 urine specimen(s) sent.   

                        



                                                                



                                 Unpreserved 1                  



                                 Urine Culture                  



                                 Aptima tube                    



                                 Other urine                    



                                                                



                                                                



                                Electronically signed by Timothy Sarkar at  12:34 PM CDT                 

 

                    2023          Formatting of this note might be differe

nt from the original. 

Jazzmine SMITH                              [facility]



                          20:24:00-00:00            Pt given printed and verbal 

discharge instructions regarding non-

cardiac chest pain, encouraged hydration. Lincoln HUMPHREY                



                                                                



                                Prescriptions provided.                 



                                                                



                                                    Pt verbalized understanding 

of instructions, pt awake alert oriented, resp reg 

unlabored, skin w/d, color appropriate for race, moves all ext well,pt 
encouraged to follow up with pcp.                           



                                                                



                                Advised to seek medical attention for new/prolon

ged/worsening of symptoms.                 



                                                                



                                PIV d'cd, dressing to site, catheter in tact.   

              



                                                                



                                                    Awake, alert oriented, resp 

reg unlabored, skin w/d, pt leaving amb with steady

gait, in no apparent distress, accompanied by his mother.                       

    



                                Electronically signed by Jazzmine Stark RN

 at 2023 8:33 PM CDT                 

 

                    2023          Formatting of this note might be differe

nt from the original. Jaime S Cryer RN                                [facility]



                17:42:51-00:00  Patient states "chest pains."                 



                                                                



                                                    Patient c/o chest pain that 

started approximately 30 minutes ago. Patient 

states that the pain is substernal, does not radiate.                           



                                Electronically signed by Cryer, Jaime S, RN at 0

2023 5:43 PM CDT                 

 

                2023      Formatting of this note is different from the or

iginal.                 [facility]



                17:37:00-00:00  [facility] Emergency Department Note                 



                                                                



                                Patient Name: Mc Bowles         

        



                                YOB: 1990 33 year old male       

          



                                Treatment Room: [place] [address]/[place] [address]             

    



                                Medical Record Number: [id number]                 



                                Primary Care Physician: Chastity Solorzano         

        



                                                                



                                Patient Escorted by: Family [5]                 



                                Mode of Arrival: Personal means [1]             

    



                                EMS Treatment Prior to ED Arrival:              

   



                                                                



                                                                



                                                                



                                Travel and Exposure Screening:                 



                                                                



                                Symptoms                        



                                Does patient have any of these symptoms?: (not r

ecorded)                 



                                                                



                                Exposure Screening                 



                                                    Has patient had contact with

 someone with a communicable disease in the last 

month?: (not recorded)                              



                                Diseases exposed to:: (not recorded)            

     



                                Is Patient Pregnant?: (not recorded)            

     



                                Exposure Date: (not recorded)                 



                                                                



                                Chief Complaint:                 



                                Chief Complaint                 



                                Patient presents with                 



                                 Chest Pain                     



                                                                



                                                                



                                History of Present Illness:                 



                                                                



                                History provided by: Patient                 



                                 used: No                 



                                Chest Pain                      



                                Pain location: Substernal area                 



                                Pain quality: throbbing                 



                                Pain radiates to: Does not radiate              

   



                                Pain severity: Moderate                 



                                Onset quality: Sudden                 



                                Duration: 2 hours                 



                                Progression: Waxing and waning                 



                                Chronicity: New                 



                                Context: at rest                 



                                Relieved by: None tried                 



                                Worsened by: Nothing                 



                                Ineffective treatments: None tried              

   



                                                    Associated symptoms: no abdo

shawn pain, no cough, no diaphoresis, no dizziness,

 no dysphagia, no fatigue, no fever, no headache, no nausea, no numbness, no 
palpitations, no shortness of breath, no vomiting and no weakness               

            



                                Associated symptoms comment: None               

  



                                Risk factors: diabetes mellitus, hypertension an

d male sex                 



                                                    Risk factors: no aortic dise

ase, no birth control, no coronary artery disease, 

no immobilization and not obese                           



                                                                



                                Past Medical History/Immunizations:             

    



                                Past Medical History:                 



                                Diagnosis Date                  



                                 ADHD                           



                                 Alcohol use 2021                 



                                 Anxiety                        



                                 Cyst and pseudocyst of pancreas 2021     

            



                                 Added automatically from request for surgery [id number]

[id number]                 



                                 Depression                     



                                 Diabetes mellitus                 



                                 Hypertriglyceridemia 2021                

 



                                 Necrotizing pancreatitis 2021            

     



                                 Added automatically from request for surgery [id number]

[id number]                 



                                 Obesity                        



                                 Pancreatitis 2020                 



                                 Pseudocyst of pancreas 2021              

   



                                 Schizophrenia, unspecified                 



                                 Seizures                       



                                 Weight loss, unintentional 2021          

       



                                                                



                                                                



                                                                



                                                                



                                Allergies:                      



                                Allergies                       



                                Allergen Reactions                 



                                 Pcn [Penicillins] Hives                 



                                                                



                                Past Social History:                 



                                Tobacco Use                     



                                                                



                                 Every Day; 1.00 packs/day; Types: Cigarettes   

              



                                 Smokeless Tobacco: Never used smokeless tobacco

.                 



                                 Comments: Delta 8                 



                                                                



                                                                



                                                                



                                Vaping Use                      



                                                                



                                 Never assessed                 



                                                                



                                                                



                                                                



                                Alcohol Use                     



                                                                



                                 Not Currently.                 



                                 Comments: 6 pack of beer every day since 17 yea

rs old. no liquor                 



                                                                



                                                                



                                                                



                                Drug Use                        



                                                                



                                 Yes; Cocaine.                  



                                 Comments: From [age] years old.                

 



                                                                



                                                                



                                                                



                                Sexual Activity                 



                                                                



                                 Not currently sexually active.                 



                                                                



                                                                



                                                                



                                Past Surgical History:                 



                                Past Surgical History:                 



                                Procedure Laterality Date                 



                                 ENDOSCOPIC RETROGRADE CHOLANGIOPANCRETOGRAPHY N

/A 2021                 



                                 Surgeon: Eamon Simon MD; Location: Endoscopy

 () OR Location                 



                                 ENDOSCOPIC RETROGRADE CHOLANGIOPANCRETOGRAPHY N

/A 2021                 



                                 Surgeon: Eamon Simon MD; Location: [facility] OR Location                 



                                 ESOPHAGOGASTRODUODENOSCOPY N/A 2021       

          



                                 Surgeon: Eamon Simon MD; Location: Endoscopy

 ([facility]) OR Location                 



                                 ESOPHAGOGASTRODUODENOSCOPY N/A 2021       

          



                                 Surgeon: Eamon Simon MD; Location: [facility]                 



                                 UPPER ULTRASOUND (SHX) N/A 2/10/2021           

      



                                 Surgeon: Eamon Simon MD; Location: Endoscopy

 ([facility]) OR Location                 



                                 UPPER ULTRASOUND (SHX) N/A 2021           

      



                                 Surgeon: Eamon Simon MD; Location: Endoscopy

 ([facility]) OR Location                 



                                 UPPER ULTRASOUND (SHX) N/A 2021           

      



                                 Surgeon: Eamon Simon MD; Location: [facility] Location                 



                                                                



                                Review of Systems:                 



                                Review of Systems                 



                                Constitutional: Negative for chills, diaphoresis

, fatigue and fever.                 



                                        HENT: Negative for congestion, rhinorrhe

a, sore throat and trouble swallowing. 

                                        



                                Eyes: Negative for photophobia, pain, discharge 

and redness.                 



                                                    Respiratory: Negative for co

ugh, chest tightness, shortness of breath and 

wheezing.                                           



                                                    Cardiovascular: Positive for

 chest pain. Negative for palpitations and leg 

swelling.                                           



                                                    Gastrointestinal: Negative f

or abdominal distention, abdominal pain, blood in 

stool, constipation, nausea and vomiting.                           



                                                    Genitourinary: Negative for 

dysuria, urgency, polyuria, frequency, hematuria 

and flank pain.                                     



                                                    Musculoskeletal: Negative fo

r arthralgias, joint swelling, myalgias and neck 

stiffness.                                          



                                Skin: Negative for color change, rash and wound.

                 



                                                    Neurological: Negative for d

izziness, seizures, syncope, facial asymmetry, 

weakness, light-headedness, numbness and headaches.                           



                                                    Psychiatric/Behavioral: Nega

tive for agitation, confusion, hallucinations and 

self-injury. The patient is not nervous/anxious.                           



                                                    Hematological: Negative for 

adenopathy and cold intolerance. Does not 

bruise/bleed easily.                                



                                Endocrine: Negative for cold intolerance, polydi

psia and polyuria.                 



                                                                



                                Physical Exam:                  



                                ED Triage Vitals [23 1744]                

 



                                Weight 81.2 kg (179 lb)                 



                                Actual or estimated                 



                                Height 1.753 m (5' 9")                 



                                BP (!) 146/81                   



                                Pulse 99                        



                                Resp 18                         



                                Temp 36.8 ?C (98.2 ?F)                 



                                Temp source Oral                 



                                SpO2 98 %                       



                                Measured on Room air                 



                                                                



                                Physical Exam                   



                                Vitals and nursing note reviewed.               

  



                                Constitutional:                 



                                 General: He is not in acute distress.          

       



                                 Appearance: He is well-developed. He is not juan pablo

phoretic.                 



                                HENT:                           



                                 Head: Normocephalic and atraumatic.            

     



                                 Right Ear: External ear normal.                

 



                                 Left Ear: External ear normal.                 



                                 Nose: Nose normal.                 



                                 Mouth/Throat:                  



                                 Pharynx: No oropharyngeal exudate.             

    



                                Eyes:                           



                                 General: No scleral icterus.                 



                                 Right eye: No discharge.                 



                                 Left eye: No discharge.                 



                                 Conjunctiva/sclera: Conjunctivae normal.       

          



                                 Pupils: Pupils are equal, round, and reactive t

o light.                 



                                Neck:                           



                                 Thyroid: No thyromegaly.                 



                                 Vascular: No JVD.                 



                                 Trachea: No tracheal deviation.                

 



                                Cardiovascular:                 



                                 Rate and Rhythm: Normal rate and regular rhythm

.                 



                                 Heart sounds: Normal heart sounds. No murmur he

margoth.                 



                                 No friction rub. No gallop.                 



                                Pulmonary:                      



                                 Effort: Pulmonary effort is normal. No respirat

ory distress.                 



                                 Breath sounds: Normal breath sounds. No stridor

. No wheezing or rales.                 



                                Chest:                          



                                 Chest wall: No tenderness.                 



                                Abdominal:                      



                                 General: Bowel sounds are normal. There is no d

istension.                 



                                 Palpations: Abdomen is soft. There is no mass. 

                



                                                     Tenderness: There is no abd

ominal tenderness. There is no guarding or rebound.

                                                    



                                Musculoskeletal:                 



                                 General: No tenderness or deformity. Normal ran

ge of motion.                 



                                 Cervical back: Normal range of motion and neck 

supple.                 



                                Lymphadenopathy:                 



                                 Cervical: No cervical adenopathy.              

   



                                Skin:                           



                                 General: Skin is warm and dry.                 



                                 Coloration: Skin is not pale.                 



                                 Findings: No erythema or rash.                 



                                Neurological:                   



                                 Mental Status: He is alert and oriented to pers

on, place, and time.                 



                                 Cranial Nerves: No cranial nerve deficit.      

           



                                 Motor: No abnormal muscle tone.                

 



                                 Coordination: Coordination normal.             

    



                                 Deep Tendon Reflexes: Reflexes are normal and s

ymmetric. Reflexes normal.                 



                                Psychiatric:                    



                                 Behavior: Behavior normal.                 



                                 Thought Content: Thought content normal.       

          



                                 Judgment: Judgment normal.                 



                                                                



                                Radiology:                      



                                XR CHEST 1 VW                   



                                Final Result                    



                                Clinical indication: chest pain                 



                                                                



                                Ordering physician: ANDRIY WEAVER             

    



                                                                



                                COMPARISON:                     



                                None                            



                                                                



                                Technique: A single AP view of the chest is obta

ined.                 



                                                                



                                Findings: The bones and soft tissues of the ches

t are unremarkable. The                 



                                cardiac and mediastinal contours are within norm

al limits. There is mild                 



                                peribronchial cuffing consistent with reactive a

irways disease versus                 



                                infectious bronchitis. There is no evidence of a

 focal airspace opacity,                 



                                pleural effusion or pneumothorax. Please note th

at chest radiography has                 



                                limited sensitivity in evaluating for pulmonary 

masses.                 



                                                                



                                                                



                                IMPRESSION                      



                                Impression:                     



                                                                



                                There is mild peribronchial cuffing consistent w

ith reactive airways                 



                                disease versus infectious bronchitis.           

      



                                                                



                                RL: [id number] AFC: [id number]                 



                                                                



                                Electronically signed by Miller Galindo MD at  6:37 PM                 



                                                                



                                                                



                                Lab Results:                    



                                Lab Results                     



                                CBC WITH DIFF - Abnormal                 



                                 Result Value Ref Range                 



                                 WBC 6.93 4.20 - 10.70 10*3/?L                 



                                 RBC 4.09 (*) 4.26 - 5.52 10*6/?L               

  



                                 HGB 12.2 12.2 - 16.4 g/dL                 



                                 HCT 35.0 (*) 38.4 - 49.3 %                 



                                 MCV 85.6 81.7 - 95.6 fL                 



                                 MCH 29.8 26.1 - 32.7 pg                 



                                 MCHC 34.9 31.2 - 35.0 g/dL                 



                                 RDW-SD 36.5 (*) 38.5 - 51.6 fL                 



                                 RDW-CV 11.9 (*) 12.1 - 15.4 %                 



                                  150 - 328 10*3/?L                 



                                 MPV 9.9 9.8 - 13.0 fL                 



                                 NRBC/100 WBC 0.0 0.0 - 10.0 /100 WBCs          

       



                                 NRBC x10^3 <0.01 10*3/?L                 



                                 GRAN MAT (NEUT) % 49.6 %                 



                                 IMM GRAN % 0.10 %                 



                                 LYMPH % 41.1 %                 



                                 MONO % 7.2 %                   



                                 EOS % 1.6 %                    



                                 BASO % 0.4 %                   



                                 GRAN MAT x10^3(ANC) 3.43 1.99 - 6.95 10*3/uL   

              



                                 IMM GRAN x10^3 <0.03 0.00 - 0.06 10*3/uL       

          



                                 LYMPH x10^3 2.85 1.09 - 3.23 10*3/uL           

      



                                 MONO x10^3 0.50  0.36 - 1.02 10*3/uL           

      



                                 EOS x10^3 0.11 0.06 - 0.53 10*3/uL             

    



                                 BASO x10^3 0.03 0.01 - 0.09 10*3/uL            

     



                                COMP. METABOLIC PANEL (27601) - Abnormal        

         



                                  135 - 145 mmol/L                 



                                 K 4.2 3.5 - 5.0 mmol/L                 



                                  98 - 108 mmol/L                 



                                 CO2 TOTAL 24 23 - 31 mmol/L                 



                                 AGAP 10 2 - 16                 



                                 BUN 9 7 - 23 mg/dL                 



                                 GLUCOSE 266 (*) 70 - 110 mg/dL                 



                                 CREATININE 0.87 0.60 - 1.25 mg/dL              

   



                                 TOTAL BILI 0.4 0.1 - 1.1 mg/dL                 



                                 CALCIUM 9.4 8.6 - 10.6 mg/dL                 



                                 T PROTEIN 7.4 6.3 - 8.2 g/dL                 



                                 ALBUMIN 4.6 3.5 - 5.0 g/dL                 



                                 ALK PHOS 54 34 - 122 U/L                 



                                 ALTv 19 5 - 50 U/L                 



                                 AST(SGOT) 21 13 - 40 U/L                 



                                 eGFR 99.9 mL/min/1.73m2                 



                                TROPONIN I - Normal                 



                                 TROPONIN I 0.002 <=0.034 ng/mL                 



                                D-DIMER - Normal                 



                                 D-DIMER <0.27 <0.41 ?g/mL (FEU)                

 



                                                                



                                EKG:                            



                                NSR Rate 96, no signs of ACS                 



                                                                



                                Orders and Treatments:                 



                                Orders Placed This Encounter                 



                                Procedures                      



                                 XR CHEST 1 VW                  



                                 CBC WITH DIFF                  



                                 COMP. METABOLIC PANEL (80906)                 



                                 TROPONIN I                     



                                 D-DIMER                        



                                                                



                                Orders Placed This Encounter                 



                                Medications                     



                                 meloxicam 15 mg tablet                 



                                 acetaminophen (TYLENOL ARTHRITIS PAIN) 650 mg C

R tablet                 



                                                                



                                First Provider Eval:                 



                                ED Events                       



                                                                



                                 Date/Time Event User Comments                 



                                 23 Medical Screening Begins ANDRIY WEAVER MD --                 



                                 23 First Provider Evaluation ANDRIY LUJAN MD --                 



                                                                



                                                                



                                                                



                                ED COURSE                       



                                                    Patient's condition stable H

EART SCORE 1, d-dimer negative, CXR within normal 

limits, will DC Home.                               



                                                                



                                Diagnosis/Impression as of 23        

         



                                Chest pain, unspecified type                 



                                Non-cardiac chest pain                 



                                                                



                                Procedures:                     



                                Procedures                      



                                                                



                                MDM:                            



                                Medical Decision Making                 



                                Problems Addressed:                 



                                Chest pain, unspecified type: self-limited or mi

nor problem                 



                                Non-cardiac chest pain: self-limited or minor pr

oblem                 



                                                                



                                Amount and/or Complexity of Data Reviewed       

          



                                External Data Reviewed: labs, ECG and notes.    

             



                                Labs: ordered. Decision-making details documente

d in ED Course.                 



                                                    Radiology: ordered and indep

endent interpretation performed. Decision-making 

details documented in ED Course.                           



                                ECG/medicine tests: ordered and independent inte

rpretation performed.                 



                                                                



                                Risk                            



                                Prescription drug management.                 



                                                                



                                                                



                                Flowsheet Documentation:                 



                                                                



                                                                



                                                                



                                                                



                                                                



                                                                



                                                                



                                                                



                                                                



                                                                



                                                                



                                                                



                                                                



                                                                



                                                                



                                                                



                                                                



                                                                



                                                                



                                                                



                                                                



                                                                



                                                                



                                                                



                                                                



                                                                



                                                                



                                                                



                                                                



                                                                



                                                                



                                                                



                                                                



                                                                



                                                                



                                                                



                                                                



                                                                



                                                                



                                                                



                                                                



                                                                



                                                                



                                                                



                                                                



                                                                



                                                                



                                                                



                                                                



                                                                



                                                                



                                                                



                                                                



                                                                



                                Scoring Tools:                  



                                                                



                                                                



                                                                



                                                                



                                No data recorded                 



                                HEART Score: 1                  



                                                                



                                                                



                                                                



                                                                



                                                                



                                                                



                                                                



                                                                



                                                                



                                                                



                                                                



                                Disposition/Condition:                 



                                ED Disposition                  



                                                                



                                 ED Disposition                 



                                Disch - Home                    



                                 Condition                      



                                Stable                          



                                 Comment                        



                                --                              



                                                                



                                                                



                                                                



                                                                



                                Discharge Medications:                 



                                Patient's Medications                 



                                START taking these medications                 



                                                     ACETAMINOPHEN (TYLENOL ARTH

RITIS PAIN) 650 MG CR TABLET Take 1 tablet by mouth

every 8 (eight) hours as needed for Pain.                           



                                 MELOXICAM 15 MG TABLET Take 1 tablet by mouth i

n the morning.                 



                                CONTINUE taking these medications which have NOT

 CHANGED                 



                                 BLOOD GLUCOSE STRIP-DISP METER KIT Use as direc

sha                 



                                 BLOOD SUGAR DIAGNOSTIC STRIP Use as directed   

              



                                         FENOFIBRATE MICRONIZED 67 MG CAPSULE Ta

ke 2 capsules by mouth in the morning. 

                                        



                                                     INSULIN NPH-REGULAR HUMAN R

 UNIT/ML (70-30) INJECTION inject 10 Units 

under the skin in the morning and 10 Units in the evening.                      

     



                                 LANCETS 23 GAUGE MISC Use as directed          

       



                                                     LIPASE-PROTEASE-AMYLASE (CR

AMNA) 36,000-114,000- 180,000 UNIT CPDR Take 1 

capsules by mouth with meals and 1 with each snack.                           



                                                     METFORMIN 1,000 MG TABLET T

tanvi 1 tablet by mouth in the morning and 1 tablet 

in the evening. Take with meals.                           



                                                     OFLOXACIN 0.3 % OTIC DROPS 

Place 5 Drops in both ears in the morning and 5 

Drops in the evening.                               



                                                     OMEGA-3 FATTY ACIDS CAPSULE

 Take 1 capsule by mouth 3 (three) times daily with

meals.                                              



                                                     ONDANSETRON 4 MG DISINTEGRA

TING TABLET Take 1 tablet by mouth every 8 (eight) 

hours as needed for Nausea and Vomiting (N/V).                           



                                 PANTOPRAZOLE 40 MG EC TABLET TAKE ONE TABLET BY

 MOUTH DAILY                 



                                 SYRINGE, DISPOSABLE, 3 ML SYRG Use as directed 

                



                                                     TRAMADOL 50 MG TABLET Take 

1 tablet by mouth every 6 (six) hours as needed 

(pain). Indications: acute pain                           



                                START taking Modified Medications as Prescribed 

                



                                 No medications on file                 



                                STOP taking these medications                 



                                 No medications on file                 



                                                                



                                Follow-up:                      



                                Contact information for follow-up               

  



                                                                



                                 Chastity Solorzano MD                 



                                Specialty: IM-INTERNAL MEDICINE                 



                                Relationship: PCP - [address] [id number]                 



                                Phone: [phone]                 



                                                                



                                                                



                                                                



                                                                



                                Electronically signed by:                 



                                                                



                                                                



                                Andriy Weaver MD                 



                                23                   



                                                                



                                Electronically signed by Andriy Weaver MD at

 2023 8:16 PM CDT                 

 

                2023      Formatting of this note might be different from 

the original.                 [facility]



                          03:43:14-00:00            Sent Actionst message to clar

molly patient symptoms prior to refill 

and ER warning signs given for persistent otitis externa.                       

    



                                Electronically signed by Chastity Solorzano MD at

 2023 3:55 AM CDT                 

 

                    2023          Formatting of this note might be differe

nt from the original. Rajeev Sanchez                               [facility]



                10:01:11-00:00  Please advise on refills MA              



                                Rx prescribed by ED                 



                                Electronically signed by Rajeev Sanchez MA a

t 2023 10:01 AM CDT                 

 

                    2023          Formatting of this note might be differe

nt from the original. Valery Colindres                               [facility]



                08:47:12-00:00  Mc Bowles is a 33 year old male 

                



                                                    Pt's mother is requesting re

fill for Rx ofloxacin 0.3 % otic drops, pt is out 

of medication, please advise                           



                                                                



                                [facility] [id number] - [place] - [address]                 



                                [address]                 



                                [place] [address]                  



                                Phone: [phone] Fax: [phone]           

      



                                                                



                                                                



                                Electronically signed by Valery Colindres at 0

2023 8:47 AM CDT

## 2023-08-26 NOTE — RAD REPORT
EXAM DESCRIPTION:  RAD - Foot Left 2 View - 8/26/2023 4:40 am

 

 

CLINICAL HISTORY:  Left great toe pain

 

COMPARISON:  None.

 

FINDINGS:  2 views of the left foot. No acute fracture or dislocation. Normal osseous mineralization.


 

IMPRESSION:  1. No acute fracture or dislocation.

 

Electronically signed by:   Rolando Cast   8/26/2023 5:29 AM CDT Workstation: [id number]

 

 

 

 

Due to temporary technical issues with the PACS/Fluency reporting system, reports are being signed by
 the in house radiologists without review as a courtesy to insure prompt reporting. The interpreting 
radiologist is fully responsible for the content of the report.

## 2025-02-06 ENCOUNTER — HOSPITAL ENCOUNTER (EMERGENCY)
Dept: HOSPITAL 97 - ER | Age: 35
LOS: 1 days | Discharge: HOME | End: 2025-02-07
Payer: SELF-PAY

## 2025-02-06 DIAGNOSIS — R07.89: Primary | ICD-10-CM

## 2025-02-06 LAB
ALBUMIN SERPL BCP-MCNC: 3.5 G/DL (ref 3.4–5)
ALBUMIN/GLOB SERPL: 1.2 {RATIO} (ref 1.1–1.8)
ALP SERPL-CCNC: 44 U/L (ref 45–117)
ALT SERPL W P-5'-P-CCNC: 24 U/L (ref 16–61)
ANION GAP SERPL CALC-SCNC: 9.7 MEQ/L (ref 5–15)
AST SERPL W P-5'-P-CCNC: 12 U/L (ref 15–37)
BILIRUB INDIRECT SERPL-MCNC: 0.1 MG/DL (ref 0.2–0.8)
BUN BLD-MCNC: 13 MG/DL (ref 7–18)
D DIMER BLD IA.DDU-MCNC: < 0.215 FEUUG/ML (ref 0–0.5)
GLOBULIN SER CALC-MCNC: 3 G/DL (ref 2.3–3.5)
GLUCOSE SERPLBLD-MCNC: 84 MG/DL (ref 74–106)
HCT VFR BLD CALC: 33.1 % (ref 39.6–49)
HGB BLD-MCNC: 11.9 G/DL (ref 13.6–17.9)
INR BLD: 1.06
LIPASE SERPL-CCNC: 24 U/L (ref 13–75)
LYMPHOCYTES # SPEC AUTO: 3 K/UL (ref 0.7–4.9)
MAGNESIUM SERPL-MCNC: 1.8 MG/DL (ref 1.6–2.4)
MCH RBC QN AUTO: 30.3 PG (ref 27–35)
MCHC RBC AUTO-ENTMCNC: 35.9 G/DL (ref 32–36)
MCV RBC: 84.5 FL (ref 80–100)
NRBC # BLD: 0 10*3/UL (ref 0–0)
NRBC BLD AUTO-RTO: 0.1 % (ref 0–0)
NT-PROBNP SERPL-MCNC: 10 PG/ML (ref ?–125)
PMV BLD: 8.1 FL (ref 7.6–11.3)
POTASSIUM SERPL-SCNC: 3.7 MEQ/L (ref 3.5–5.1)
PROTHROMBIN TIME: 11.1 SECONDS (ref 9.4–12.5)
RBC # BLD: 3.92 M/UL (ref 4.33–5.43)
TROPONIN I SERPL HS-MCNC: 8.7 PG/ML (ref ?–58.9)
WBC # BLD AUTO: 8.1 THOU/UL (ref 4.3–10.9)

## 2025-02-06 PROCEDURE — 71045 X-RAY EXAM CHEST 1 VIEW: CPT

## 2025-02-06 PROCEDURE — 80076 HEPATIC FUNCTION PANEL: CPT

## 2025-02-06 PROCEDURE — 93005 ELECTROCARDIOGRAM TRACING: CPT

## 2025-02-06 PROCEDURE — 85025 COMPLETE CBC W/AUTO DIFF WBC: CPT

## 2025-02-06 PROCEDURE — 83690 ASSAY OF LIPASE: CPT

## 2025-02-06 PROCEDURE — 85379 FIBRIN DEGRADATION QUANT: CPT

## 2025-02-06 PROCEDURE — 99284 EMERGENCY DEPT VISIT MOD MDM: CPT

## 2025-02-06 PROCEDURE — 83880 ASSAY OF NATRIURETIC PEPTIDE: CPT

## 2025-02-06 PROCEDURE — 36415 COLL VENOUS BLD VENIPUNCTURE: CPT

## 2025-02-06 PROCEDURE — 83735 ASSAY OF MAGNESIUM: CPT

## 2025-02-06 PROCEDURE — 80048 BASIC METABOLIC PNL TOTAL CA: CPT

## 2025-02-06 PROCEDURE — 84484 ASSAY OF TROPONIN QUANT: CPT

## 2025-02-06 PROCEDURE — 85610 PROTHROMBIN TIME: CPT

## 2025-02-06 NOTE — XMS REPORT
Continuity of Care Document



                          Created on: 2025





DAVIAN BOWLES

External Reference #: [id number]

: 1990

Sex: Male



Demographics





                                        Address             [address] [place]  [address]

 

                                        Home Phone          [phone]

 

                                        Work Phone          [phone]

 

                                        Mobile Phone        [phone]

 

                                        Email Address       [email or web address]

 

                                        Preferred Language  English

 

                                        Marital Status      Unknown

 

                                        Episcopal Affiliation Unknown

 

                                        Race                Unknown

 

                                        Additional Race(s)  Unavailable

 

                                        Ethnic Group        Unknown





Author





                                        Name                Unknown

 

                                        Address             [address]

[place]  [address]

 

                                        [facility]

[facility]

 

                                        Address             [address]

[address]

[place]  [address]

 

                                        Phone               [phone]





Support





                          Name         Relationship Address      Phone

 

                          SUSAN KENNEDY Mother       Unknown      Syeda

vailable

 

                                SUSAN KENNEDY               [address]

[place]  [address]                     [phone]

 

                          one else per patient, No Emergency Contact Unknown    

  Unavailable

 

                          PATIENT, NO ONE ELSE PER Personal Relationship Unknown

      Unavailable

 

                          PETER VALDOVINOS Aunt         Unknown      [phone]





Care Team Providers





                                Care Team Member Name Role            Phone

 

                                PCP, PATIENT DOES NOT HAVE A Primary Care Physic

candido Unavailable

 

                                EAMON SIMON   Attending Clinician Unavailable

 

                                Janes Claire DO Attending Clinician [phone]

 

                                Blanche Perez DO Attending Clinician [phone]

 

                                Doctor Unassigned, No Name Attending Clinician U

Brian Diggs MD Attending Clinician [phone]

 

                                Pcp-Lab         Attending Clinician Unavailable

 

                                David Chester MD Attending Clini

ti [phone]

 

                                Blanche Perez DO Attending Clinician [phone]

 

                                DAVID CHESTER Attending Clinicia

n Unavailable

 

                                Rashad LEUNG, Dion Attending Clinician +[phone] 

 

                                Brian Gonzáles MD Attending Clinician [phone]

 

                                Linda Rojas Attending Clinician Unav

ailable

 

                                Pcp, Patient Does Not Have A Attending Clinician

 [phone]

 

                                RYAN GRANGER Attending Clinician Unavailable

 

                                Josie TRUJILLO, Janes Walters Attending Clinician +

[phone]

 

                                Ryan Granger MD Attending Clinician +



 

                                IFEANYI RASHID Attending Clinician Unavailab

Johny Dumont MD Attending Clinician 

[phone]

 

                                Josie LEUNG, Brian Persaud Attending Clinician +

[phone]

 

                                Dung Waller MD Attending Clinician [phone]

 

                                BILL NIXON Attending Clinician Unavaila

aristeo Simon MD, Eamon Attending Clinician Unavailable

 

                                Doctor Unassigned, No Name Attending Clinician U

[facility]

 

                                Lab, [facility]        Attending Clinician Unavailable

 

                                Bill Nixon MD Attending Clinician +

[phone]

 

                                Joseph TRUJILLO, Butch Attending Clinician [phone]

 

                                BUTCH RUIZ    Attending Clinician Unavailable

 

                                DELISA MATHUR Attending Clinician UnavailSHIRA Partida Attending Clinician Unavailable

 

                                TRACY RUDD Attending Clinician Unavailab

Tracy Bass DO Attending Clinician [phone]

 

                                Pathology       Attending Clinician Unavailable

 

                                PATHOLOGY       Attending Clinician Unavailable

 

                                ANDRIY WEAVER Attending Clinician Unavailable

 

                                Meg LEUNG, Andriy Attending Clinician [phone] 

 

                                LORENE GUERRERO Attending Clinician UnavailLorene Gray MD Attending Clinician +

[phone]

 

                                OLAF STRICKLAND Attending Clinician Unavailable

 

                                Olaf Strickland DO Attending Clinician [phone]

 

                                JAGJIT LEO Attending Clinician Unavaila

Jagjit East Attending Clinician +

[phone]

 

                                Martín Nix Attending Clinician [phone]

 

                                MARKELL KAUFFMAN Attending Clinician Unavailable

 

                                Markell Romero Attending Clinician +

[phone]

 

                                Joseph Leal MD Attending Clinician [phone]

 

                                JOSEPH LEAL Attending Clinician UnavailLUBNA Mayen Attending Clinician Unavailable

 

                                Lubna Pimentel NP Attending Clinician +-[phone] 

 

                                JAGJIT CLAIRE Attending Clinician UnavailBrian Phillips MD Attending Clinician +

 

                                SCHOENSTEIN, LYNDA Attending Clinician Unavailab

forest MEMBRENO, Yonis Attending Clinician +

 

                                Daniel LEUNG, Sourav SMITH Attending Clinician [phone]

 

                                Schoenstein MD, Xu Attending Clinician +

[phone]

 

                                BRIAN BROCK Attending Clinician Unavailable

 

                                ARMAAN CHERRY Attending Clinician Unavailkristal Cherry MD, Armaan Jesus Attending Clinician +

[phone]

 

                                Mika Jaramillo MD Attending Clinician Syeda

jennyble

 

                                Edmond LEUNG, Tita Attending Clinician Syeda

PERFECTO Beck Attending Clinician Unavailable

 

                                PERFECTO Hoff Attending Clinician +[phone] 

 

                                Kimberly Gorman MD Attending Clinician [phone]

 

                                Unknown, Attending Attending Clinician Unavailab

Jagjit Macedo MD Attending Clinician +

[phone]

 

                                MIKA JARAMILLO Attending Clinician Aubree torres

 

                                UNKNOWN, ATTENDING Attending Clinician Unavailab

le

 

                                Pob, Adc Lab Main Attending Clinician UnavailKary Guillen MD Attending Clinician [phone]

 

                                MARY BETH CORCORAN Attending Clinician Unavailable

 

                                Nilo MEMBRENO, Mary Beth Attending Clinician [phone]

 

                                Carina Womack MA Attending Clinician Unavail

able

 

                                Abbe Blevins MD Attending Clinician [phone]

[phone]

 

                                Nurse, Pcp Immunization Attending Clinician Unav

ailable

 

                                Shamar Gardiner DO Attending Clinician +

[phone]

 

                                SHAMAR GARDINER Attending Clinician Unavail

able

 

                                Gisselle LMSW, Fern F Attending Clinician UnavailLiza Guillermo MD Attending Clinician [phone]

[phone]

 

                                LIZA BUSTAMANTE    Attending Clinician Unavailable

 

                                Only, Lcc Test  Attending Clinician Unavailable

 

                                Reno Patricia MD Attending Clinician +

[phone]

 

                                RENO PATRICIA Attending Clinician Unavailable

 

                                KARY STRAUSS Attending Clinician LETICIA Joseph.HRosa Attending Clinician Unavaila

Anais Garnica RN Attending Clinician [phone]

 

                                Steve Castillo MD Attending Clinician [phone]

[phone]

 

                                STEVE CASTILLO Attending Clinician Unavail

able

 

                                SETVE CASTILLO Attending Clinician Unavail

able

 

                                Hortencia Lorenz Attending Clinician [phone]

 

                                Amrit Lopez MD Attending Clinician [phone]

 

                                Luis Enrique LEUNG, Keisha Attending Clinician [phone]

 

                                Naz LEUNG, Amber Attending Clinician [phone]

 

                                AMBER CHILDS  Attending Clinician Unavailable

 

                                Andriy HUMPHREY, Enrique Avalos Attending Clinician [facility] Attending Clinician +[phone] 

 

                                Antonio HUMPHREY, [place] CARTER Attending Clinician +



 

                                Jordan LEUNG, Ana Maria Attending Clinician [phone]

 

                                Rocco Simon MD Attending Clinician [phone]

 

                                ANA MARIA RAJPUT Attending Clinician Unavailable

 

                                Terrence Frazier MD Attending Clinician [phone]

[phone]

 

                                EAMON SIMON   Admitting Clinician Unavailable

 

                                SHIRA COY Admitting Clinician Unavailable

 

                                TRACY RUDD Admitting Clinician Unavailab

ANDRIY Jimenez Admitting Clinician Unavailable

 

                                LORENE GUERRERO Admitting Clinician UnavailMARKELL Gonzales Admitting Clinician Unavailable

 

                                LUBNA PIMENTEL Admitting Clinician Unavailable

 

                                MARY BETH CORCORAN Admitting Clinician Unavailable

 

                                Eamon Simon MD Admitting Clinician [phone]

 

                                PERFECTO MCHUGH Admitting Clinician Unavailable

 

                                Amber Childs MD Admitting Clinician [phone]

 

                                AMBER CHILDS  Admitting Clinician Unavailable

 

                                Rocco Simon MD Admitting Clinician [phone]

 

                                ROCCO SIMON Admitting Clinician Unavailable







Payers





                    Payer Name Policy Type Policy Number Effective Date Expirati

on Date Source

 

                                                    MEDICAID [employer] 

PENDING                                 PENDING             2023 

00:00:00                                2023 

00:00:00                                







Problems





                                                    Condition 

Name                                    Condition 

Details                                 Condition 

Category                  Status                    Onset 

Date                                    Resolution 

Date                                    Last 

Treatment 

Date                                    Treating 

Clinician                 Comments                  Source

 

                                                    Schizophre

salty, 

unspecifie

d                                       Schizophre

salty, 

unspecifie

d                   Disease             Active              

8 

00:00:

00                                                               [facility]

 

                                                    Pancreatic 

insufficie

ncy                                     Pancreatic 

insufficie

ncy                 Disease             Active               

00:00:

00                                                               [facility]

 

                                                    Splenic 

vein 

thrombosis                              Splenic 

vein 

thrombosis          Disease             Active              2021

2 

00:00:

00                                                               [facility]

 

                                                    Chronic 

pancreatit

is, 

unspecifie

d 

pancreatit

is type                                 Chronic 

pancreatit

is, 

unspecifie

d 

pancreatit

is type             Disease             Active               

00:00:

00                                                              Overview: 

Formattin

g of this 

note 

might be 

different 

from the 

original.

Added 

automatic

ally from 

request 

for 

surgery 

[id number]                                  [facility]

 

                                                    Type 2 

diabetes 

mellitus 

without 

complicati

on, with 

long-term 

current 

use of 

insulin                                 Type 2 

diabetes 

mellitus 

without 

complicati

on, with 

long-term 

current 

use of 

insulin             Disease             Active              2021-0

3-25 

00:00:

00                                                               [facility]

 

                      Anxiety    Anxiety    Disease    Active     2021-0

3-25 

00:00:

00                                                               [facility]

 

                                                    Psychiatri

c disorder                              Psychiatri

c disorder                Disease                   Resolve

d                                       2021-0

3-25 

00:00:

00                                      2024 

00:00:00                                2024 

15:52:44                                                    [facility]

 

                                                    Necrotizin

g 

pancreatit

is                                      Necrotizin

g 

pancreatit

is                        Disease                   Resolve

d                                        

00:00:

00                                      2021 

00:00:00                                2021 

14:29:11                                                    [facility]

 

                                                    Cyst and 

pseudocyst 

of 

pancreas                                Cyst and 

pseudocyst 

of 

pancreas                  Disease                   Resolve

d                                        

00:00:

00                                      2021 

00:00:00                                2021 

14:29:14                                                    [facility]

 

                                                    Hypertrigl

yceridemia                              Hypertrigl

yceridemia                Disease                   Resolve

d                                       2021-0

3-25 

00:00:

00                                      2021 

00:00:00                                2021 

14:28:58                                                    [facility]

 

                                                    Weight 

loss, 

unintentio

nal                                     Weight 

loss, 

unintentio

nal                       Disease                   Resolve

d                                       2021-0

3-25 

00:00:

00                                      2021 

00:00:00                                2021 

14:29:02                                                    [facility]

 

                                                    Right 

upper 

quadrant 

abdominal 

pain                                    Right 

upper 

quadrant 

abdominal 

pain                      Disease                   Resolve

d                                       

3-25 

00:00:

00                                      2021 

00:00:00                                2021 

14:29:05                                                    [facility]

 

                                                    Alcohol 

use                                     Alcohol 

use                       Disease                   Resolve

d                                       

3-25 

00:00:

00                                      2021 

00:00:00                                2021 

14:29:07                                                    [facility]

 

                                                    Pseudocyst 

of 

pancreas                                Pseudocyst 

of 

pancreas                  Disease                   Resolve

d                                       

2-09 

00:00:

00                                      2021 

00:00:00                                2021 

14:28:50                                                    [facility]

 

                                                    Lactic 

acidosis                                Lactic 

acidosis                  Disease                   Resolve

d                                       2020

2-25 

00:00:

00                                      2021 

00:00:00                                2021 

15:32:38                                                    [facility]







Allergies, Adverse Reactions, Alerts





                                                    Allergy 

Name                                    Allergy 

Type            Status          Severity        Reaction(s)     Onset 

Date                                    Inactive 

Date                                    Treating 

Clinician                 Comments                  Source

 

                                                    Penicill

ins                                     Propensi

ty to 

adverse 

reaction

s               Active                          Hives           -

2- 

00:00:

00                                                              [facility]

 

                                                    PENICILL

INS                                     Drug 

Class           Active                          Hives           2020

2-25 

00:00:

00                                                              [facility]

 

                                                    Penicill

ins                                     Propensi

ty to 

adverse 

reaction

s               Active                          Hives           2020

2-25 

00:00:

00                                                              [facility]







Social History





                    Social Habit Start Date Stop Date Quantity  Comments  Source

 

                                                    History SDOH 

Housing Places 

Lived                                                            [facility]

 

                    [facility]                                         [facility]

 

                    [facility]ual orientation                                         [facility]

 

                                                    History of Social 

function                                2024 

00:00:00                                2024 

00:00:00                                                    [facility]

 

                                                    Alcoholic beverage 

intake                                  2024 

00:00:00                                2024 

00:00:00                                Ex-drinker 

(finding)                                           [facility]

 

                                                    Tobacco use and 

exposure                                2024 

00:00:00                                2024 

00:00:00                                Smokeless 

tobacco non-user                                    [facility]

 

                                        Alcohol intake      2023 

00:00:00                                2023 

00:00:00                                Ex-drinker 

(finding)                                           [facility]

 

                                                    Exposure to 

SARS-CoV-2 (event)                      2023 

00:00:00                                2023 

22:03:00            Not sure                                [facility]

 

                                        Tobacco Comment     2023 

00:00:00                                2023 

00:00:00            Delta [address]                                 [facility]

 

                                                    Cigarettes smoked 

current (pack per 

day) - Reported                         2023 

00:00:00                                2023 

00:00:00                                                    [facility]

 

                                                    History SDOH 

Alcohol Frequency                       2023 

00:00:00                                2023 

00:00:00            1                                       [facility]

 

                                                    History SDOH 

Alcohol Std Drinks                      2023 

00:00:00                                2023 

00:00:00            0                                       [facility]

 

                                                    History SDOH 

Alcohol Binge                           2023 

00:00:00                                2023 

00:00:00            1                                       [facility]

 

                                                    History SDOH Social 

Connections Phone                       2023 

00:00:00                                2023 

00:00:00            5                                       [facility]

 

                                                    History SDOH Social 

Connections Get 

Together                                2023 

00:00:00                                2023 

00:00:00            3                                       [facility]

 

                                                    History SDOH Social 

Connections Druze                      2023 

00:00:00                                2023 

00:00:00            2                                       [facility]

 

                                                    History SDOH Social 

Connections 

Membership                              2023 

00:00:00                                2023 

00:00:00            1                                       [facility]

 

                                                    History SDOH Social 

Connections 

Meetings                                2023 

00:00:00                                2023 

00:00:00            3                                       [facility]

 

                                                    History SDOH Social 

Connections Living                      2023 

00:00:00                                2023 

00:00:00            7                                       [facility]

 

                                                    History SDOH 

Physical Activity 

DPW                                     2023 

00:00:00                                2023 

00:00:00            7                                       [facility]

 

                                                    History SDOH 

Physical Activity 

MPS                                     2023 

00:00:00                                2023 

00:00:00            3                                       [facility]

 

                                        History SDOH Stress 2023 

00:00:00                                2023 

00:00:00            1                                       [facility]

 

                                                    History SDOH 

Housing Unable to 

Pay                                     2023 

00:00:00                                2023 

00:00:00            3                                       [facility]

 

                                                    History SDOH 

Housing Homeless 

Last Year                               2023 

00:00:00                                2023 

00:00:00            3                                       [facility]

 

                                        Education           2021 

00:00:00                                2021 

00:00:00            14                                      [facility]

 

                                                    History SDOH 

Financial                               2021 

00:00:00                                2021 

00:00:00            5                                       [facility]

 

                                                    History SDOH Food 

Worry                                   2021 

00:00:00                                2021 

00:00:00            1                                       [facility]

 

                                                    History SDOH Food 

Scarcity                                2021 

00:00:00                                2021 

00:00:00            1                                       [facility]

 

                                                    History SDOH 

Transport Med                           2021 

00:00:00                                2021 

00:00:00            2                                       [facility]

 

                                                    History SDOH 

Transport Non-Med                       2021 

00:00:00                                2021 

00:00:00            2                                       [facility]

 

                                        Alcohol Comment     2020 

00:00:00                                2020 

00:00:00                                6 pack of beer 

every day since 

17 years old. no 

liquor                                              [facility]

 

                                                    History of tobacco 

use                                                 2020 

00:00:00            Cigarette Smoker                        [facility]

 

                                                    Sex assigned at 

birth                                   1990 

00:00:[birth year]                                1990 

00:00:00                                                    [facility]







                          Smoking Status Start Date   Stop Date    Source

 

                          Smokes tobacco daily 2024 00:00:00              

[facility]

 

                                                    Occasional tobacco 

smoker              2021 00:00:00                     [facility]

 

                                Ex-smoker       2021 00:00:00 2021 

00:00:00                                [facility]







Medications





                                                    Ordered 

Medication 

Name                                    Filled 

Medication 

Name                                    Start 

Date                                    Stop 

Date                                    Current 

Medication?                             Ordering 

Clinician       Indication      Dosage          Frequency       Signature 

(SIG)               Comments            Components          Source

 

                                                    fenofibrate 

micronized 

67 mg 

capsule                                             2024 

00:00:

00                  Yes                 [id number] 134mg               Take 2 

capsules 

by mouth 

in the 

morning.                                                    [facility]

 

                                                    lipase-prot

ease-amylas

e (CREON) 

36,000-114,

000- 

180,000 

unit CpDR                                           2024 

00:00:

00                  Yes                 [id number]                      Take 1 

capsules 

by mouth 

with meals 

and 1 with 

each 

snack.                                                      [facility]

 

                                                    pantoprazol

e 40 mg EC 

tablet                                              2024 

00:00:

00                  Yes                 [id number] 40mg                Take 1 

tablet by 

mouth in 

the 

morning.                                                    [facility]

 

                                                    divalproex 

250 mg 

delayed 

release 

tablet                                               

13:37:

02                  Yes                           250mg               Take 1 

tablet by 

mouth 

every 12 

(twelve) 

hours.                                                      [facility]

 

                                                    hydrOXYzine 

10 mg 

tablet                                               

14:22:

41                  Yes                           10mg                Take 1 

tablet by 

mouth.                                                      [facility]

 

                                                    paliperidon

e palmitate 

(INVEGA 

SUSTENNA 

IM)                                                  

14:21:

43                                       

00:00

:00        No                                                     by 

Intramuscu

lar route 

once every 

month.                                                      [facility]

 

                                                    rosuvastati

n 10 mg 

tablet                                               

00:00:

00                  Yes                 [id number] 10mg                Take 1 

tablet by 

mouth at 

bedtime.                                                    [facility]

 

                                                    fenofibrate 

micronized 

67 mg 

capsule                                              

00:00:

00                                       

00:00

:00        No                    [id number]  134mg                 Take 2 

capsules 

by mouth 

in the 

morning.                                                    [facility]

 

                                                    pantoprazol

e 40 mg EC 

tablet                                               

00:00:

00                                       

00:00

:00        No                    [id number]  40mg                  Take 1 

tablet by 

mouth in 

the 

morning.                                                    [facility]

 

                                                    metFORMIN 

1,000 mg 

tablet                                               

00:00:

00                                       

00:00

:00        No                    [id number]  1000mg                Take 1 

tablet by 

mouth in 

the 

morning 

and 1 

tablet in 

the 

evening. 

Take with 

meals.                                                      [facility]

 

                                                    fenofibrate 

micronized 

67 mg 

capsule                                              

00:00:

 

00:00

:00        No                    [id number]  134mg                 Take 2 

capsules 

by mouth 

in the 

morning.                                                    [facility]

 

                                                    pantoprazol

e 40 mg EC 

tablet                                               

00:00:

00                                       

00:00

:00        No                    [id number]  40mg                  Take 1 

tablet by 

mouth in 

the 

morning.                                                    [facility]

 

                                                    metFORMIN 

1,000 mg 

tablet                                               

00:00:

00                                       

00:00

:00        No                    [id number]  1000mg                Take 1 

tablet by 

mouth in 

the 

morning 

and 1 

tablet in 

the 

evening. 

Take with 

meals.                                                      [facility]

 

                                                    metFORMIN 

1,000 mg 

tablet                                               

00:00:

00                                       

00:00

:00        No                    [id number]  1000mg                Take 1 

tablet by 

mouth in 

the 

morning 

and 1 

tablet in 

the 

evening. 

Take with 

meals.                                                      [facility]

 

                                                    pantoprazol

e 40 mg EC 

tablet                                              2023 

00:00:

00                                       

00:00

:00        No                    [id number]  40mg                  Take 1 

tablet by 

mouth in 

the 

morning.                                                    [facility]

 

                                                    Blood-Gluco

se Sensor 

(DEXCOM G7 

SENSOR) 

Gloria                                                2023 

00:00:

00                  Yes                 [id number]                     Use as 

directed                                                    [facility]

 

                                                    Blood-Gluco

se 

Meter,Yashira newberry 

(DEXCOM G7 

) 

Misc                                                2023 

00:00:

00                  Yes                 [id number]                     Use as 

directed                                                    [facility]

 

                                                    rosuvastati

n 10 mg 

tablet                                              2023 

00:00:

00                                       

00:00

:00        No                    [id number]  10mg                  Take 1 

tablet by 

mouth at 

bedtime.                                                    [facility]

 

                                                    NaCl 0.9% 

(NS) bolus 

infusion 

1,000 mL                                             

02:15:

00                                       

02:37

:00        No                               1000mL                at 999 

mL/hr, 

1,000 mL, 

IV 

Infusion, 

ONCE, 1 

dose, On 

u 8/3/23 

at 2115, 

ASAP                                                        [facility]

 

                                                    ondansetron 

(ZOFRAN 

(PF)) 

injection 4 

mg                                                   

01:30:

00                                       

01:50

:00        No                               4mg                   4 mg, Slow 

IV Push, 

ONCE, 1 

dose, On 

Thu 8/3/23 

at 2030, 

ASAP                                                        [facility]

 

                                                    ondansetron 

4 mg 

disintegrat

ing tablet                                           

00:00:

00                                       

00:00

:00        No                    [id number]   4mg                   Take 1 

tablet by 

mouth 

every 8 

(eight) 

hours as 

needed for 

Nausea and 

Vomiting 

(N/V).                                                      [facility]

 

                                                    Insulin 

NPH-Regular 

Human Rec 

100 unit/mL 

(70-30) 

injection                                            

00:00:

00                  Yes                 [id number] 12U                 inject 12 

Units 

under the 

skin in 

the 

morning 

and 12 

Units in 

the 

evening.                                                    [facility]

 

                                                    fenofibrate 

micronized 

67 mg 

capsule                                              

00:00:

00                                       

00:00

:00        No                    [id number]  134mg                 Take 2 

capsules 

by mouth 

in the 

morning.                                                    [facility]

 

                                                    pantoprazol

e 40 mg EC 

tablet                                               

00:00:

00                                       

00:00

:00        No                    [id number]  40mg                  Take 1 

tablet by 

mouth in 

the 

morning.                                                    [facility]

 

                                                    ofloxacin 

0.3 % otic 

drops                                                

00:00:

00                                       

00:00

:00                 No                                      [id number]

[id number]               5[drp]                                  Place 5 

Drops in 

both ears 

in the 

morning 

and 5 

Drops in 

the 

evening.                                                    [facility]

 

                                                    Blood-Gluco

se 

Meter,Yashira newberry 

(DEXCOM G4 

-SH

ARE KIT) 

Misc                                                 

00:00:

00                                       

00:00

:00        No                    [id number]                        Use as 

directed                                                    [facility]

 

                                                    acetaminoph

en (TYLENOL 

ARTHRITIS 

PAIN) 650 

mg CR 

tablet                                               

00:00:

00                                       

00:00

:00        No                    [id number]  650mg                 Take 1 

tablet by 

mouth 

every 8 

(eight) 

hours as 

needed for 

Pain.                                                       [facility]

 

                                                    meloxicam 

15 mg 

tablet                                               

00:00:

00                                       

00:00

:00        No                    [id number]  15mg                  Take 1 

tablet by 

mouth in 

the 

morning.                                                    [facility]

 

                                                    acetaminoph

en 

(TYLENOL) 

tablet 650 

mg                                                   

18:30:

00                                       

19:03

:00        No                               650mg                 650 mg, 

Oral, 

ONCE, 1 

dose, On 

23 at 

1330, ASAP                                                  [facility]

 

                                                    ibuprofen 

(IBU) 

tablet 800 

mg                                                   

18:30:

00                                       

19:03

:00        No                               800mg                 800 mg, 

Oral, 

ONCE, 1 

dose, On 

23 at 

1330, ASAP                                                  [facility]

 

                                                    traMADoL 50 

mg tablet                                            

00:00:

00                                       

00:00

:00        No                    [id number]       50mg                  Take 1 

tablet by 

mouth 

every 6 

(six) 

hours as 

needed 

(pain). 

Indication

s: acute 

pain                                                        [facility]

 

                                                    ofloxacin 

0.3 % otic 

drops                                                

00:00:

00                                       

00:00

:00                 No                                      [id number]

[id number]               5[drp]                                  Place 5 

Drops in 

both ears 

in the 

morning 

and 5 

Drops in 

the 

evening.                                                    [facility]

 

                                                    ofloxacin 

0.3 % otic 

drops                                                

00:00:

00                                       

04:59

:00        No                    [id number]   5[drp]                Place 5 

Drops in 

both ears 

in the 

morning 

and 5 

Drops in 

the 

evening. 

Do all 

this for 

10 days.                                                    [facility]

 

                                                    Insulin 

NPH-Regular 

Human Rec 

100 unit/mL 

(70-30) 

injection                                            

00:00:

00                                       

00:00

:00        No                    [id number]  10U                   inject 10 

Units 

under the 

skin in 

the 

morning 

and 10 

Units in 

the 

evening.                                                    [facility]

 

                                                    ondansetron 

(ZOFRAN 

(PF)) 

injection 4 

mg                                                   

03:30:

00                                       

03:16

:00        No                               4mg                   4 mg, Slow 

IV Push, 

ONCE, 1 

dose, On 

23 at 

2230, ASAP                                                  [facility]

 

                                                    fenofibrate 

micronized 

67 mg 

capsule                                             

5-03 

00:00:

00                                       

00:00

:00        No                    [id number]  134mg                 Take 2 

capsules 

by mouth 

in the 

morning.                                                    [facility]

 

                                                    metFORMIN 

1,000 mg 

tablet                                               

00:00:

00                  Yes                 [id number] 1000mg              Take 1 

tablet by 

mouth in 

the 

morning 

and 1 

tablet in 

the 

evening. 

Take with 

meals.                                                      [facility]

 

                                                    NaCl 0.9% 

(NS) bolus 

infusion 

1,000 mL                                             

02:45:

00                                       

04:18

:00        No                               1000mL                at 999 

mL/hr, 

1,000 mL, 

IV 

Infusion, 

ONCE, 1 

dose, On 

23 at 

2145, [facility]

 

                                                    clonazePAM 

1 mg tablet                                          

18:32:

35                                       

00:00

:00        No                               1mg                   Take 1 mg 

by mouth 

in the 

morning 

and 1 mg 

in the 

evening.                                                    [facility]

 

                                                    PANTOPRAZOL

E 40 mg EC 

tablet                                              2023-0

3-13 

00:00:

00                                       

00:00

:00        No                                                     TAKE ONE 

TABLET BY 

MOUTH 

DAILY                                                       [facility]

 

                                                    ziprasidone 

(GEODON) 

capsule 20 

mg                                                   

07:00:

00                  Yes                           20mg                20 mg, 

Oral, BID 

MEALS, 

First dose 

on Sat 

23 at 

0100, 

Until 

Discontinu

ed, 

Routine                                                     [facility]

 

                                                    LORazepam 

(ATIVAN) 

injection 2 

mg                                                   

07:00:

00                                       

06:49

:00        No                               2mg                   2 mg, 

Intramuscu

lar, ONCE, 

1 dose, On 

Sat 

23 at 

0100, STAT                                                  [facility]

 

                                                    metFORMIN 

(GLUCOPHAGE

) tablet 

1,000 mg                                             

14:00:

00                  Yes                           1000mg              1,000 mg, 

Oral, BID 

MEALS, 

First dose 

on Thu 

23 at 

0800, 

Until 

Discontinu

ed, 

Routine                                                     [facility]

 

                                                    metFORMIN 

(GLUCOPHAGE

) tablet 

1,000 mg                                             

00:30:

00                                       

23:57

:00        No                               1000mg                1,000 mg, 

Oral, 

ONCE, 1 

dose, On 

23 

at 1830, 

Routine                                                     [facility]

[facility]

 

                                                    clonazePAM 

(KLONOPIN) 

tablet 1 mg                                          

00:30:

00                                       

23:57

:00        No                               1mg                   1 mg, 

Oral, 

ONCE, 1 

dose, On 

23 

at 1830, 

Routine                                                     [facility]

[facility]

 

                                                    insulin NPH 

(HUMULIN N) 

injection 8 

Units                                                

23:45:

00                                       

23:59

:00        No                               .1U/kg                8 Units 

(rounded 

from 8.22 

Units = 

0.1 

Units/kg 

?82.2 kg), 

Subcutaneo

us, ONCE, 

1 dose, On 

23 

at 1745, 

ASAP                                                        [facility]

 

                                                    nicotine 

(NICODERM) 

21 mg/24 hr 

patch 1 

Patch                                                

22:30:

00                        Yes                                    1{patch

}                                                   1 Patch, 

Topical, 

Administer 

over 24 

Hours, 

Q24H, 

First dose 

on 23 at 

1630, 

Until 

Discontinu

ed, 

Routine                                                     [facility]

 

                                                    clonazePAM 

1 mg tablet                                          

17:31:

10                  Yes                           1mg                 Take 1 mg 

by mouth 

in the 

morning 

and 1 mg 

in the 

evening.                                                    [facility]

 

                                                    fenofibrate 

micronized 

67 mg 

capsule                                              

00:00:

00                                       

00:00

:00        No                    [id number]  134mg                 Take 2 

capsules 

by mouth 

in the 

morning.                                                    [facility]

 

                                                    NaCl 0.9% 

(NS) bolus 

infusion 

1,000 mL                                            2022 

17:00:

00                                       

16:30

:00        No                               1000mL                at 999 

mL/hr, 

1,000 mL, 

IV 

Infusion, 

ONCE, 1 

dose, On 

22 at 

1100, STAT                                                  [facility]

 

                                                    magnesium 

sulfate in 

water 2 

gram/50 mL 

(4 %) 

infusion 2 

g                                                   2022 

16:30:

00                                       

16:55

:00        No                               2g                    2 g, IV 

Piggyback, 

Administer 

over 60 

Minutes, 

ONCE, 1 

dose, On 

22 at 

1030, 

Routine                                                     [facility]

 

                                                    ondansetron 

(ZOFRAN 

(PF)) 

injection 4 

mg                                                  2022 

15:45:

00                                       

14:47

:00        No                               4mg                   4 mg, Slow 

IV Push, 

ONCE, 1 

dose, On 

22 at 

0945, ASAP                                                  [facility]

 

                                                    NaCl 0.9% 

(NS) bolus 

infusion 

1,000 mL                                            2022 

15:45:

00                                       

15:54

:00        No                               1000mL                at 999 

mL/hr, 

1,000 mL, 

IV 

Infusion, 

ONCE, 1 

dose, On 

22 at 

0945, STAT                                                  [facility]

 

                                                    ondansetron 

4 mg 

disintegrat

ing tablet                                          2022 

00:00:

00                                       

00:00

:00        No                    [id number]   4mg                   Take 1 

tablet by 

mouth 

every 8 

(eight) 

hours as 

needed for 

Nausea and 

Vomiting 

(N/V).                                                      [facility]

 

                                                    clonazePAM 

1 mg tablet                                         2022 

13:45:

55                  Yes                           1mg                 Take 1 mg 

by mouth 

in the 

morning 

and 1 mg 

in the 

evening.                                                    [facility]

 

                                                    Insulin 

NPH-Regular 

Human Rec 

100 unit/mL 

(70-30) 

injection                                           2022 

00:00:

00                                       

00:00

:00        No                    [id number]  10U                   inject 10 

Units 

under the 

skin in 

the 

morning 

and 10 

Units in 

the 

evening.                                                    [facility]

 

                                                    rosuvastati

n 10 mg 

tablet                                              2022 

00:00:

00                                       

05:59

:00                 No                                      [id number]

[id number]                10mg                                    Take 1 

tablet by 

mouth at 

bedtime 

for 90 

days.                                                       [facility]

 

                                                    fenofibrate 

micronized 

67 mg 

capsule                                              

00:00:

00                                       

00:00

:00        No                    [id number]  134mg                 Take 2 

capsules 

by mouth 

in the 

morning.                                                    [facility]

 

                                                    clonazePAM 

1 mg tablet                                          

14:25:

14                  Yes                           1mg                 Take 1 mg 

by mouth 

in the 

morning 

and 1 mg 

in the 

evening.                                                    [facility]

 

                                                    Insulin 

NPH-Regular 

Human Rec 

100 unit/mL 

(70-30) 

injection                                           - 

00:00:

00                                      2022-

10-28 

00:00

:00        No                    [id number]  8U                    inject 8 

Units 

under the 

skin 2 

(two) 

times 

daily.                                                      [facility]

 

                                                    metFORMIN 

1,000 mg 

tablet                                              

4-11 

00:00:

00                                       

00:00

:00        No                    [id number]  1000mg                Take 1 

tablet by 

mouth 2 

(two) 

times 

daily with 

meals.                                                      [facility]

 

                                                    fenofibrate 

micronized 

67 mg 

capsule                                             2022-0

3-23 

00:00:

00                                       

00:00

:00        No                    [id number]  134mg                 Take 2 

capsules 

by mouth 

daily.                                                      [facility]

 

                                                    MULTIVITAMI

N ORAL                                               

14:55:

45                                       

00:00

:00        No                                                     Take by 

mouth 

daily.                                                      [facility]

 

                                                    metFORMIN 

1,000 mg 

tablet                                               

00:00:

00                                       

00:00

:00        No                    [id number]  1000mg                Take 1 

tablet by 

mouth 2 

(two) 

times 

daily with 

meals.                                                      [facility]

 

                                                    pantoprazol

e 

(PROTONIX) 

40 mg EC 

tablet                                               

00:00:

00                                       

00:00

:00        No                               40mg                  Take 1 

tablet by 

mouth 

daily.                                                      [facility]

 

                                                    fenofibrate 

micronized 

67 mg 

capsule                                             - 

00:00:

00                                       

00:00

:00        No                    [id number]  134mg                 Take 2 

capsules 

by mouth 

daily.                                                      [facility]

 

                                                    Insulin 

NPH-Regular 

Human Rec 

100 unit/mL 

(70-30) 

injection                                           2021

2-24 

00:00:

00                                       

00:00

:00        No                    [id number]  5U                    inject 5 

Units 

under the 

skin 2 

(two) 

times 

daily.                                                      [facility]

 

                                                    fenofibrate 

micronized 

67 mg 

capsule                                             2021

0-20 

00:00:

00                                       

00:00

:00        No                    [id number]  134mg                 Take 2 

capsules 

by mouth 

daily.                                                      [facility]

 

                                                    metFORMIN 

500 mg 

tablet                                              2021

0- 

00:00:

00                                       

00:00

:00        No                    [id number]  500mg                 Take 1 

tablet by 

mouth 2 

(two) 

times 

daily with 

meals.                                                      [facility]

 

                                                    Insulin 

NPH-Regular 

Human Rec 

100 unit/mL 

(70-30) 

injection                                           2021

0 

00:00:

00                                       

00:00

:00        No                    [id number]  5U                    inject 5 

Units 

under the 

skin 2 

(two) 

times 

daily.                                                      [facility]

 

                                                    pantoprazol

e 

(PROTONIX) 

40 mg EC 

tablet                                              24 

00:00:

00                                       

00:00

:00        No                    [id number]   40mg                  Take 1 

tablet by 

mouth 

daily.                                                      [facility]

 

                                                    lipase-prot

ease-amylas

e (CREON) 

36,000-114,

000- 

180,000 

unit CpDR                                           

5-15 

00:00:

00                                       

00:00

:00        No                    [id number]                         Take 1 

capsules 

by mouth 

with meals 

and 1 with 

each 

snack.                                                      [facility]

 

                                                    fenofibrate 

micronized 

67 mg 

capsule                                              

00:00:

00                                       

00:00

:00        No                    [id number]  134mg                 Take 2 

capsules 

by mouth 

daily.                                                      [facility]

 

                                                    metFORMIN 

500 mg 

tablet                                               

00:00:

00                                       

00:00

:00        No                    [id number]  500mg                 Take 1 

tablet by 

mouth 2 

(two) 

times 

daily with 

meals.                                                      [facility]

 

                                                    omega-3 

fatty acids 

capsule                                              

00:00:

00                  Yes                 [id number] 1000mg              Take 1 

capsule by 

mouth 3 

(three) 

times 

daily with 

meals.                                                      [facility]

 

                                                    omega-3 

fatty acids 

capsule                                             

2 

00:00:

00                  Yes                 [id number] 1000mg              Take 1 

capsule by 

mouth 3 

(three) 

times 

daily with 

meals.                                                      [facility]

 

                                                    Syringe, 

Disposable, 

3 mL Syrg                                            

00:00:

00                  Yes                 [id number]                      Use as 

directed                                                    [facility]

 

                                                    Blood 

Glucose 

Strip-Disp 

Meter Kit                                            

00:00:

00                  Yes                 [id number]                      Use as 

directed                                                    [facility]

 

                                                    blood sugar 

diagnostic 

strip                                                

00:00:

00                  Yes                 [id number]                      Use as 

directed                                                    [facility]

 

                                                    Syringe, 

Disposable, 

3 mL Syrg                                            

00:00:

00                  Yes                 [id number]                      Use as 

directed                                                    [facility]

 

                                                    lancets 23 

gauge Misc                                           

00:00:

00                  Yes                 [id number]                      Use as 

directed                                                    [facility]

 

                                                    Blood 

Glucose 

Strip-Disp 

Meter Kit                                            

00:00:

00                  Yes                 [id number]                      Use as 

directed                                                    [facility]

 

                                                    blood sugar 

diagnostic 

strip                                                

00:00:

00                  Yes                 [id number]                      Use as 

directed                                                    [facility]

 

                                                    clonazePAM 

1 mg tablet                                          

00:00:

00                                       

00:00

:00        No                    [id number]   1mg                   Take 1 

tablet by 

mouth 2 

(two) 

times 

daily.                                                      [facility]







Immunizations





                                                    Ordered 

Immunization Name                       Filled 

Immunization Name Date            Status          Comments        Source

 

                                                    Influenza Virus 

Vaccine Quad IM, 

Preserv and ABX 

Free 6 MO-64 YRS 

(FLUCELVAX)                                         2023 

00:00:00            Completed                               [facility]

 

                                                    SARS-COV-2 COVID 19 

CLAUDIA SUCROSE 

VACCINE 12+, 

[id number], 0.3 ML 

(30 MCG), IM PFIZER 

(GRAY TOP)                                          2023 

00:00:00            Completed                               

 

                                TDAP                            2023 

00:00:00            Completed                               [facility]

 

                                TDAP                            2023 

00:00:00            Completed                               [facility]

 

                                TDAP                            2023 

00:00:00            Completed                               [facility]

 

                                TDAP                            2023 

00:00:00            Completed                               [facility]

 

                                TDAP                            2023 

00:00:00            Completed                               [facility]

 

                                TDAP                            2023 

00:00:00            Completed                               [facility]

 

                                TDAP                            2023 

00:00:00            Completed                               [facility]

 

                                TDAP                            2023 

00:00:00            Completed                               [facility]

 

                                TDAP                            2023 

00:00:00            Completed                               [facility]

 

                                TDAP                            2023 

00:00:00            Completed                               [facility]

 

                                TDAP                            2023 

00:00:00            Completed                               [facility]

 

                                TDAP                            2023 

00:00:00            Completed                               [facility]

 

                                TDAP                            2023 

00:00:00            Completed                               [facility]

 

                                TDAP                            2023 

00:00:00            Completed                               [facility]

 

                                TDAP                            2023 

00:00:00            Completed                               [facility]

 

                                TDAP                            2023 

00:00:00            Completed                               [facility]

 

                                TDAP                            2023 

00:00:00            Completed                               [facility]

 

                                TDAP                            2023 

00:00:00            Completed                               [facility]

 

                                TDAP                            2023 

00:00:00            Completed                               [facility]

 

                                TDAP                            2023 

00:00:00            Completed                               [facility]

 

                                TDAP                            2023 

00:00:00            Completed                               [facility]

 

                                TDAP                            2023 

00:00:00            Completed                               [facility]

 

                                TDAP                            2023 

00:00:00            Completed                               [facility]

 

                                TDAP                            2023 

00:00:00            Completed                               [facility]

 

                                TDAP                            2023 

00:00:00            Completed                               [facility]

 

                                TDAP                            2023 

00:00:00            Completed                               

 

                                                    Influenza Virus 

Vaccine Quad IM, 

Preserv and ABX 

Free 6 MO-64 YRS                                    2022-10-28 

00:00:00            Completed                               [facility]

 

                                                    SARS-COV-2 COVID-19 

CLAUDIA-SUCROSE 

VACCINE 12 YRS+, 

BIVALENT 0.3ML, IM, 

(PFIZER GRAY TOP 

BOOSTER)                                            2022-10-28 

00:00:00            Completed                               [facility]

 

                                                    Influenza Virus 

Vaccine Quad IM, 

Preserv and ABX 

Free 6 MO-64 YRS                                    2022-10-28 

00:00:00            Completed                               [facility]

 

                                                    SARS-COV-2 COVID-19 

CLAUDIA-SUCROSE 

VACCINE 12 YRS+, 

BIVALENT 0.3ML, IM, 

(PFIZER GRAY TOP 

BOOSTER)                                            2022-10-28 

00:00:00            Completed                               [facility]

 

                                                    Influenza Virus 

Vaccine Quad IM, 

Preserv and ABX 

Free 6 MO-64 YRS                                    2022-10-28 

00:00:00            Completed                               [facility]

 

                                                    SARS-COV-2 COVID-19 

CLAUDIA-SUCROSE 

VACCINE 12 YRS+, 

BIVALENT 0.3ML, IM, 

(PFIZER GRAY TOP 

BOOSTER)                                            2022-10-28 

00:00:00            Completed                               [facility]

 

                                                    Influenza Virus 

Vaccine Quad IM, 

Preserv and ABX 

Free 6 MO-64 YRS                                    2022-10-28 

00:00:00            Completed                               [facility]

 

                                                    SARS-COV-2 COVID-19 

CLAUDIA-SUCROSE 

VACCINE 12 YRS+, 

BIVALENT 0.3ML, IM, 

(PFIZER GRAY TOP 

BOOSTER)                                            2022-10-28 

00:00:00            Completed                               [facility]

 

                                                    Influenza Virus 

Vaccine Quad IM, 

Preserv and ABX 

Free 6 MO-64 YRS                                    2022-10-28 

00:00:00            Completed                               [facility]

 

                                                    SARS-COV-2 COVID-19 

CLAUDIA-SUCROSE 

VACCINE 12 YRS+, 

BIVALENT 0.3ML, IM, 

(PFIZER GRAY TOP 

BOOSTER)                                            2022-10-28 

00:00:00            Completed                               [facility]

 

                                                    Influenza Virus 

Vaccine Quad IM, 

Preserv and ABX 

Free 6 MO-64 YRS                                    2022-10-28 

00:00:00            Completed                               [facility]

 

                                                    SARS-COV-2 COVID-19 

CLAUDIA-SUCROSE 

VACCINE 12 YRS+, 

BIVALENT 0.3ML, IM, 

(PFIZER GRAY TOP 

BOOSTER)                                            2022-10-28 

00:00:00            Completed                               [facility]

 

                                                    Influenza Virus 

Vaccine Quad IM, 

Preserv and ABX 

Free 6 MO-64 YRS                                    2022-10-28 

00:00:00            Completed                               [facility]

 

                                                    SARS-COV-2 COVID-19 

CLAUDIA-SUCROSE 

VACCINE 12 YRS+, 

BIVALENT 0.3ML, IM, 

(PFIZER GRAY TOP 

BOOSTER)                                            2022-10-28 

00:00:00            Completed                               [facility]

 

                                                    Influenza Virus 

Vaccine Quad IM, 

Preserv and ABX 

Free 6 MO-64 YRS                                    2022-10-28 

00:00:00            Completed                               [facility]

 

                                                    SARS-COV-2 COVID-19 

CLAUDIA-SUCROSE 

VACCINE 12 YRS+, 

BIVALENT 0.3ML, IM, 

(PFIZER GRAY TOP 

BOOSTER)                                            2022-10-28 

00:00:00            Completed                               [facility]

 

                                                    Influenza Virus 

Vaccine Quad IM, 

Preserv and ABX 

Free 6 MO-64 YRS                                    2022-10-28 

00:00:00            Completed                               [facility]

 

                                                    SARS-COV-2 COVID-19 

CLAUDIA-SUCROSE 

VACCINE 12 YRS+, 

BIVALENT 0.3ML, IM, 

(PFIZER GRAY TOP 

BOOSTER)                                            2022-10-28 

00:00:00            Completed                               [facility] [facility]

 

                                                    Influenza Virus 

Vaccine Quad IM, 

Preserv and ABX 

Free 6 MO-64 YRS                                    2022-10-28 

00:00:00            Completed                               [facility]

 

                                                    SARS-COV-2 COVID-19 

CLAUDIA-SUCROSE 

VACCINE 12 YRS+, 

BIVALENT 0.3ML, IM, 

(PFIZER GRAY TOP 

BOOSTER)                                            2022-10-28 

00:00:00            Completed                               [facility]

 

                                                    Influenza Virus 

Vaccine Quad IM, 

Preserv and ABX 

Free 6 MO-64 YRS                                    2022-10-28 

00:00:00            Completed                               [facility]

 

                                                    SARS-COV-2 COVID-19 

CLAUDIA-SUCROSE 

VACCINE 12 YRS+, 

BIVALENT 0.3ML, IM, 

(PFIZER GRAY TOP 

BOOSTER)                                            2022-10-28 

00:00:00            Completed                               [facility]

 

                                                    Influenza Virus 

Vaccine Quad IM, 

Preserv and ABX 

Free 6 MO-64 YRS                                    2022-10-28 

00:00:00            Completed                               [facility]

 

                                                    SARS-COV-2 COVID-19 

CLAUDIA-SUCROSE 

VACCINE 12 YRS+, 

BIVALENT 0.3ML, IM, 

(PFIZER GRAY TOP 

BOOSTER)                                            2022-10-28 

00:00:00            Completed                               [facility]

 

                                                    Influenza Virus 

Vaccine Quad IM, 

Preserv and ABX 

Free 6 MO-64 YRS                                    2022-10-28 

00:00:00            Completed                               [facility]

 

                                                    SARS-COV-2 COVID-19 

CLAUDIA-SUCROSE 

VACCINE 12 YRS+, 

BIVALENT 0.3ML, IM, 

(PFIZER GRAY TOP 

BOOSTER)                                            2022-10-28 

00:00:00            Completed                               [facility]

 

                                                    Influenza Virus 

Vaccine Quad IM, 

Preserv and ABX 

Free 6 MO-64 YRS                                    2022-10-28 

00:00:00            Completed                               [facility]

 

                                                    SARS-COV-2 COVID-19 

CLAUDIA-SUCROSE 

VACCINE 12 YRS+, 

BIVALENT 0.3ML, IM, 

(PFIZER GRAY TOP 

BOOSTER)                                            2022-10-28 

00:00:00            Completed                               [facility]

 

                                                    Influenza Virus 

Vaccine Quad IM, 

Preserv and ABX 

Free 6 MO-64 YRS                                    2022-10-28 

00:00:00            Completed                               [facility]

 

                                                    SARS-COV-2 COVID-19 

CLAUDIA-SUCROSE 

VACCINE 12 YRS+, 

BIVALENT 0.3ML, IM, 

(PFIZER GRAY TOP 

BOOSTER)                                            2022-10-28 

00:00:00            Completed                               [facility]

 

                                                    Influenza Virus 

Vaccine Quad IM, 

Preserv and ABX 

Free 6 MO-64 YRS                                    2022-10-28 

00:00:00            Completed                               [facility]

 

                                                    SARS-COV-2 COVID-19 

CLAUDIA-SUCROSE 

VACCINE 12 YRS+, 

BIVALENT 0.3ML, IM, 

(PFIZER GRAY TOP 

BOOSTER)                                            2022-10-28 

00:00:00            Completed                               [facility]

 

                                                    Influenza Virus 

Vaccine Quad IM, 

Preserv and ABX 

Free 6 MO-64 YRS                                    2022-10-28 

00:00:00            Completed                               [facility]

 

                                                    SARS-COV-2 COVID-19 

CLAUDIA-SUCROSE 

VACCINE 12 YRS+, 

BIVALENT 0.3ML, IM, 

(PFIZER GRAY TOP)                                   2022-10-28 

00:00:00            Completed                               [facility]

 

                                                    Influenza Virus 

Vaccine Quad IM, 

Preserv and ABX 

Free 6 MO-64 YRS                                    2022-10-28 

00:00:00            Completed                               [facility]

 

                                                    SARS-COV-2 COVID-19 

CLAUDIA-SUCROSE 

VACCINE 12 YRS+, 

BIVALENT 0.3ML, IM, 

(PFIZER GRAY TOP)                                   2022-10-28 

00:00:00            Completed                               [facility]

 

                                                    Influenza Virus 

Vaccine Quad IM, 

Preserv and ABX 

Free 6 MO-64 YRS                                    2022-10-28 

00:00:00            Completed                               [facility]

 

                                                    SARS-COV-2 COVID-19 

CLAUDIA-SUCROSE 

VACCINE 12 YRS+, 

BIVALENT 0.3ML, IM, 

(PFIZER GRAY TOP)                                   2022-10-28 

00:00:00            Completed                               [facility]

 

                                                    Influenza Virus 

Vaccine Quad IM, 

Preserv and ABX 

Free 6 MO-64 YRS                                    2022-10-28 

00:00:00            Completed                               [facility]

 

                                                    SARS-COV-2 COVID-19 

CLAUDIA-SUCROSE 

VACCINE 12 YRS+, 

BIVALENT 0.3ML, IM, 

(PFIZER GRAY TOP)                                   2022-10-28 

00:00:00            Completed                               [facility]

 

                                                    Influenza Virus 

Vaccine Quad IM, 

Preserv and ABX 

Free 6 MO-64 YRS                                    2022-10-28 

00:00:00            Completed                               [facility]

 

                                                    SARS-COV-2 COVID-19 

CLAUDIA-SUCROSE 

VACCINE 12 YRS+, 

BIVALENT 0.3ML, IM, 

(PFIZER GRAY TOP)                                   2022-10-28 

00:00:00            Completed                               [facility]

 

                                                    Influenza Virus 

Vaccine Quad IM, 

Preserv and ABX 

Free 6 MO-64 YRS                                    2022-10-28 

00:00:00            Completed                               [facility]

 

                                                    SARS-COV-2 COVID-19 

CLAUDIA-SUCROSE 

VACCINE 12 YRS+, 

BIVALENT 0.3ML, IM, 

(PFIZER GRAY TOP)                                   2022-10-28 

00:00:00            Completed                               [facility]

 

                                                    Influenza Virus 

Vaccine Quad IM, 

Preserv and ABX 

Free 6 MO-64 YRS                                    2022-10-28 

00:00:00            Completed                               [facility]

 

                                                    SARS-COV-2 COVID-19 

CLAUDIA-SUCROSE 

VACCINE 12 YRS+, 

BIVALENT 0.3ML, IM, 

(PFIZER GRAY TOP)                                   2022-10-28 

00:00:00            Completed                               [facility]

 

                                                    Influenza Virus 

Vaccine Quad IM, 

Preserv and ABX 

Free 6 MO-64 YRS                                    2022-10-28 

00:00:00            Completed                               [facility]

 

                                                    SARS-COV-2 COVID-19 

CLAUDIA-SUCROSE 

VACCINE 12 YRS+, 

BIVALENT 0.3ML, IM, 

(PFIZER GRAY TOP)                                   2022-10-28 

00:00:00            Completed                               [facility]

 

                                                    Influenza Virus 

Vaccine Quad IM, 

Preserv and ABX 

Free 6 MO-64 YRS                                    2022-10-28 

00:00:00            Completed                               [facility]

 

                                                    SARS-COV-2 COVID-19 

CLAUDIA-SUCROSE 

VACCINE 12 YRS+, 

BIVALENT 0.3ML, IM, 

(PFIZER GRAY TOP)                                   2022-10-28 

00:00:00            Completed                               [facility]

 

                                                    Influenza Virus 

Vaccine Quad IM, 

Preserv and ABX 

Free 6 MO-64 YRS                                    2022-10-28 

00:00:00            Completed                               [facility]

 

                                                    SARS-COV-2 COVID-19 

CLAUDIA-SUCROSE 

VACCINE 12 YRS+, 

BIVALENT 0.3ML, IM, 

(PFIZER GRAY TOP)                                   2022-10-28 

00:00:00            Completed                               [facility]

 

                                                    Influenza Virus 

Vaccine Quad IM, 

Preserv and ABX 

Free 6 MO-64 YRS                                    2022-10-28 

00:00:00            Completed                               [facility]

 

                                                    SARS-COV-2 COVID-19 

CLAUDIA-SUCROSE 

VACCINE 12 YRS+, 

BIVALENT 0.3ML, IM, 

(PFIZER GRAY TOP)                                   2022-10-28 

00:00:00            Completed                               [facility]

 

                                                    Influenza Virus 

Vaccine Quad IM, 

Preserv and ABX 

Free 6 MO-64 YRS                                    2022-10-28 

00:00:00            Completed                               [facility]

 

                                                    SARS-COV-2 COVID-19 

CLAUDIA-SUCROSE 

VACCINE 12 YRS+, 

BIVALENT 0.3ML, IM, 

(PFIZER GRAY TOP)                                   2022-10-28 

00:00:00            Completed                               [facility]

 

                                                    Influenza Virus 

Vaccine Quad IM, 

Preserv and ABX 

Free 6 MO-64 YRS                                    2022-10-28 

00:00:00            Completed                               [facility]

 

                                                    SARS-COV-2 COVID-19 

CLAUDIA-SUCROSE 

VACCINE 12 YRS+, 

BIVALENT 0.3ML, IM, 

(PFIZER GRAY TOP)                                   2022-10-28 

00:00:00            Completed                               [facility]

 

                                                    Influenza Virus 

Vaccine Quad IM, 

Preserv and ABX 

Free 6 MO-64 YRS                                    2022-10-28 

00:00:00            Completed                               [facility]

 

                                                    SARS-COV-2 COVID-19 

CLAUDIA-SUCROSE 

VACCINE 12 YRS+, 

BIVALENT 0.3ML, IM, 

(PFIZER GRAY TOP)                                   2022-10-28 

00:00:00            Completed                               [facility]

 

                                                    Influenza Virus 

Vaccine Quad IM, 

Preserv and ABX 

Free 6 MO-64 YRS                                    2022-10-28 

00:00:00            Completed                               [facility]

 

                                                    SARS-COV-2 COVID-19 

CLAUDIA-SUCROSE 

VACCINE 12 YRS+, 

BIVALENT 0.3ML, IM, 

(PFIZER GRAY TOP)                                   2022-10-28 

00:00:00            Completed                               [facility]

 

                                                    Influenza Virus 

Vaccine Quad IM, 

Preserv and ABX 

Free 6 MO-64 YRS                                    2022-10-28 

00:00:00            Completed                               [facility]

 

                                                    SARS-COV-2 COVID-19 

CLAUDIA-SUCROSE 

VACCINE 12 YRS+, 

BIVALENT 0.3ML, IM, 

(PFIZER GRAY TOP)                                   2022-10-28 

00:00:00            Completed                               [facility]

 

                                                    Influenza Virus 

Vaccine Quad IM, 

Preserv and ABX 

Free 6 MO-64 YRS                                    2022-10-28 

00:00:00            Completed                               [facility]

 

                                                    SARS-COV-2 COVID-19 

CLAUDIA-SUCROSE 

VACCINE 12 YRS+, 

BIVALENT 0.3ML, IM, 

(PFIZER GRAY TOP)                                   2022-10-28 

00:00:00            Completed                               [facility]

 

                                                    Influenza Virus 

Vaccine Quad IM, 

Preserv and ABX 

Free 6 MO-64 YRS                                    2022-10-28 

00:00:00            Completed                               [facility]

 

                                                    SARS-COV-2 COVID-19 

CLAUDIA-SUCROSE 

VACCINE 12 YRS+, 

BIVALENT 0.3ML, IM, 

(PFIZER GRAY TOP)                                   2022-10-28 

00:00:00            Completed                               [facility]

 

                                                    Influenza Virus 

Vaccine Quad IM, 

Preserv and ABX 

Free 6 MO-64 YRS                                    2022-10-28 

00:00:00            Completed                               [facility]

 

                                                    SARS-COV-2 COVID-19 

CLAUDIA-SUCROSE 

VACCINE 12 YRS+, 

BIVALENT 0.3ML, IM, 

(PFIZER GRAY TOP)                                   2022-10-28 

00:00:00            Completed                               [facility]

 

                                                    Influenza Virus 

Vaccine Quad IM, 

Preserv and ABX 

Free 6 MO-64 YRS                                    2022-10-28 

00:00:00            Completed                               [facility]

 

                                                    SARS-COV-2 COVID-19 

CLAUDIA-SUCROSE 

VACCINE 12 YRS+, 

BIVALENT 0.3ML, IM, 

(PFIZER GRAY TOP)                                   2022-10-28 

00:00:00            Completed                               [facility]

 

                                                    Influenza Virus 

Vaccine Quad IM, 

Preserv and ABX 

Free 6 MO-64 YRS                                    2022-10-28 

00:00:00            Completed                               [facility]

 

                                                    SARS-COV-2 COVID-19 

CLAUDIA-SUCROSE 

VACCINE 12 YRS+, 

BIVALENT 0.3ML, IM, 

(PFIZER GRAY TOP)                                   2022-10-28 

00:00:00            Completed                               [facility]

 

                                                    Influenza Virus 

Vaccine Quad IM, 

Preserv and ABX 

Free 6 MO-64 YRS                                    2022-10-28 

00:00:00            Completed                               [facility]

 

                                                    SARS-COV-2 COVID-19 

CLAUDIA-SUCROSE 

VACCINE 12 YRS+, 

BIVALENT 0.3ML, IM, 

(PFIZER GRAY TOP)                                   2022-10-28 

00:00:00            Completed                               [facility]

 

                                                    Influenza Virus 

Vaccine Quad IM, 

Preserv and ABX 

Free 6 MO-64 YRS                                    2022-10-28 

00:00:00            Completed                               [facility]

 

                                                    SARS-COV-2 COVID-19 

CLAUDIA-SUCROSE 

VACCINE 12 YRS+, 

BIVALENT 0.3ML, IM, 

(PFIZER GRAY TOP)                                   2022-10-28 

00:00:00            Completed                               [facility]

 

                                                    Influenza Virus 

Vaccine Quad IM, 

Preserv and ABX 

Free 6 MO-64 YRS 

(FLUCELVAX)                                         2022-10-28 

00:00:00            Completed                               [facility]

 

                                                    SARS-COV-2 COVID-19 

CLAUDIA-SUCROSE 

VACCINE 12 YRS+, 

BIVALENT 0.3ML, IM, 

(PFIZER GRAY TOP)                                   2022-10-28 

00:00:00            Completed                               

 

                                                    SARS-COV-2 COVID-19 

PFIZER VACCINE                                      2021-10-25 

00:00:00            Completed                               [facility]

 

                                                    SARS-COV-2 COVID-19 

PFIZER VACCINE                                      2021-10-25 

00:00:00            Completed                               [facility]

 

                                                    SARS-COV-2 COVID-19 

PFIZER VACCINE                                      2021-10-25 

00:00:00            Completed                               [facility]

 

                                                    SARS-COV-2 COVID-19 

PFIZER VACCINE                                      2021-10-25 

00:00:00            Completed                               [facility]

 

                                                    SARS-COV-2 COVID-19 

PFIZER VACCINE                                      2021-10-25 

00:00:00            Completed                               [facility]

 

                                                    SARS-COV-2 COVID-19 

PFIZER VACCINE                                      2021-10-25 

00:00:00            Completed                               [facility]

 

                                                    SARS-COV-2 COVID-19 

PFIZER VACCINE                                      2021-10-25 

00:00:00            Completed                               [facility]

 

                                                    SARS-COV-2 COVID-19 

PFIZER VACCINE                                      2021-10-25 

00:00:00            Completed                               [facility]

 

                                                    SARS-COV-2 COVID-19 

PFIZER VACCINE                                      2021-10-25 

00:00:00            Completed                               [facility]

 

                                                    SARS-COV-2 COVID-19 

PFIZER VACCINE                                      2021-10-25 

00:00:00            Completed                               [facility]

 

                                                    SARS-COV-2 COVID-19 

PFIZER VACCINE                                      2021-10-25 

00:00:00            Completed                               [facility]

 

                                                    SARS-COV-2 COVID-19 

PFIZER VACCINE                                      2021-10-25 

00:00:00            Completed                               [facility]

 

                                                    SARS-COV-2 COVID-19 

PFIZER VACCINE                                      2021-10-25 

00:00:00            Completed                               [facility]

 

                                                    SARS-COV-2 COVID-19 

PFIZER VACCINE                                      2021-10-25 

00:00:00            Completed                               [facility]

 

                                                    SARS-COV-2 COVID-19 

PFIZER VACCINE                                      2021-10-25 

00:00:00            Completed                               [facility]

 

                                                    SARS-COV-2 COVID-19 

PFIZER VACCINE                                      2021-10-25 

00:00:00            Completed                               [facility]

 

                                                    SARS-COV-2 COVID-19 

PFIZER VACCINE                                      2021-10-25 

00:00:00            Completed                               [facility]

 

                                                    SARS-COV-2 COVID-19 

PFIZER VACCINE                                      2021-10-25 

00:00:00            Completed                               [facility]

 

                                                    SARS-COV-2 COVID-19 

PFIZER VACCINE                                      2021-10-25 

00:00:00            Completed                               [facility]

 

                                                    SARS-COV-2 COVID-19 

PFIZER VACCINE                                      2021-10-25 

00:00:00            Completed                               [facility]

 

                                                    SARS-COV-2 COVID-19 

PFIZER VACCINE                                      2021-10-25 

00:00:00            Completed                               [facility]

 

                                                    SARS-COV-2 COVID-19 

PFIZER VACCINE                                      2021-10-25 

00:00:00            Completed                               [facility]

 

                                                    SARS-COV-2 COVID-19 

PFIZER VACCINE                                      2021-10-25 

00:00:00            Completed                               [facility]

 

                                                    SARS-COV-2 COVID-19 

PFIZER VACCINE                                      2021-10-25 

00:00:00            Completed                               [facility]

 

                                                    SARS-COV-2 COVID-19 

PFIZER VACCINE                                      2021-10-25 

00:00:00            Completed                               [facility]

 

                                                    SARS-COV-2 COVID-19 

PFIZER VACCINE                                      2021-10-25 

00:00:00            Completed                               [facility]

 

                                                    SARS-COV-2 COVID-19 

PFIZER VACCINE                                      2021-10-25 

00:00:00            Completed                               [facility]

 

                                                    SARS-COV-2 COVID-19 

PFIZER VACCINE                                      2021-10-25 

00:00:00            Completed                               [facility]

 

                                                    SARS-COV-2 COVID-19 

PFIZER VACCINE                                      2021-10-25 

00:00:00            Completed                               [facility]

 

                                                    SARS-COV-2 COVID-19 

PFIZER VACCINE                                      2021-10-25 

00:00:00            Completed                               [facility]

 

                                                    SARS-COV-2 COVID-19 

PFIZER VACCINE                                      2021-10-25 

00:00:00            Completed                               [facility]

 

                                                    SARS-COV-2 COVID-19 

PFIZER VACCINE                                      2021-10-25 

00:00:00            Completed                               [facility]

 

                                                    SARS-COV-2 COVID-19 

PFIZER VACCINE                                      2021-10-25 

00:00:00            Completed                               [facility]

 

                                                    SARS-COV-2 COVID-19 

PFIZER VACCINE                                      2021-10-25 

00:00:00            Completed                               [facility]

 

                                                    SARS-COV-2 COVID-19 

PFIZER VACCINE                                      2021-10-25 

00:00:00            Completed                               [facility]

 

                                                    SARS-COV-2 COVID-19 

PFIZER VACCINE                                      2021-10-25 

00:00:00            Completed                               [facility]

 

                                                    SARS-COV-2 COVID-19 

PFIZER VACCINE                                      2021-10-25 

00:00:00            Completed                               [facility]

 

                                                    SARS-COV-2 COVID-19 

PFIZER VACCINE                                      2021-10-25 

00:00:00            Completed                               [facility]

 

                                                    SARS-COV-2 COVID-19 

PFIZER VACCINE                                      2021-10-25 

00:00:00            Completed                               [facility]

 

                                                    SARS-COV-2 COVID-19 

PFIZER VACCINE                                      2021-10-25 

00:00:00            Completed                               [facility]

 

                                                    SARS-COV-2 COVID-19 

PFIZER VACCINE                                      2021-10-25 

00:00:00            Completed                               [facility]

 

                                                    SARS-COV-2 COVID-19 

PFIZER VACCINE                                      2021-10-25 

00:00:00            Completed                               [facility]

 

                                                    SARS-COV-2 COVID-19 

PFIZER VACCINE                                      2021-10-25 

00:00:00            Completed                               [facility]

 

                                                    SARS-COV-2 COVID-19 

PFIZER VACCINE                                      2021-10-25 

00:00:00            Completed                               [facility]

 

                                                    SARS-COV-2 COVID-19 

PFIZER VACCINE                                      2021-10-25 

00:00:00            Completed                               [facility]

 

                                                    SARS-COV-2 COVID-19 

PFIZER VACCINE                                      2021-10-25 

00:00:00            Completed                               [facility]

 

                                                    Influenza Virus 

Vaccine (6-35 mo)                                   2021-10-01 

00:00:00            Completed                               [facility]

 

                                                    Influenza Virus 

Vaccine (6-35 mo)                                   2021-10-01 

00:00:00            Completed                               [facility]

 

                                                    Influenza Virus 

Vaccine (6-35 mo)                                   2021-10-01 

00:00:00            Completed                               [facility]

 

                                                    Influenza Virus 

Vaccine (6-35 mo)                                   2021-10-01 

00:00:00            Completed                               [facility]

 

                                                    Influenza Virus 

Vaccine (6-35 mo)                                   2021-10-01 

00:00:00            Completed                               [facility]

 

                                                    Influenza Virus 

Vaccine (6-35 mo)                                   2021-10-01 

00:00:00            Completed                               [facility]

 

                                                    Influenza Virus 

Vaccine (6-35 mo)                                   2021-10-01 

00:00:00            Completed                               [facility]

 

                                                    Influenza Virus 

Vaccine (6-35 mo)                                   2021-10-01 

00:00:00            Completed                               [facility]

 

                                                    Influenza Virus 

Vaccine (6-35 mo)                                   2021-10-01 

00:00:00            Completed                               [facility]

 

                                                    Influenza Virus 

Vaccine (6-35 mo)                                   2021-10-01 

00:00:00            Completed                               [facility]

 

                                                    Influenza Virus 

Vaccine (6-35 mo)                                   2021-10-01 

00:00:00            Completed                               [facility]

 

                                                    Influenza Virus 

Vaccine (6-35 mo)                                   2021-10-01 

00:00:00            Completed                               [facility]

 

                                                    Influenza Virus 

Vaccine (6-35 mo)                                   2021-10-01 

00:00:00            Completed                               [facility]

 

                                                    Influenza Virus 

Vaccine (6-35 mo)                                   2021-10-01 

00:00:00            Completed                               [facility]

 

                                                    Influenza Virus 

Vaccine (6-35 mo)                                   2021-10-01 

00:00:00            Completed                               [facility]

 

                                                    Influenza Virus 

Vaccine (6-35 mo)                                   2021-10-01 

00:00:00            Completed                               [facility]

 

                                                    Influenza Virus 

Vaccine (6-35 mo)                                   2021-10-01 

00:00:00            Completed                               [facility]

 

                                                    Influenza Virus 

Vaccine (6-35 mo)                                   2021-10-01 

00:00:00            Completed                               [facility]

 

                                                    Influenza Virus 

Vaccine (6-35 mo)                                   2021-10-01 

00:00:00            Completed                               [facility]

 

                                                    Influenza Virus 

Vaccine (6-35 mo)                                   2021-10-01 

00:00:00            Completed                               [facility]

 

                                                    Influenza Virus 

Vaccine (6-35 mo)                                   2021-10-01 

00:00:00            Completed                               [facility]

 

                                                    Influenza Virus 

Vaccine (6-35 mo)                                   2021-10-01 

00:00:00            Completed                               [facility]

 

                                                    Influenza Virus 

Vaccine (6-35 mo)                                   2021-10-01 

00:00:00            Completed                               [facility]

 

                                                    Influenza Virus 

Vaccine (6-35 mo)                                   2021-10-01 

00:00:00            Completed                               [facility]

 

                                                    Influenza Virus 

Vaccine (6-35 mo)                                   2021-10-01 

00:00:00            Completed                               [facility]

 

                                                    Influenza Virus 

Vaccine (6-35 mo)                                   2021-10-01 

00:00:00            Completed                               [facility]

 

                                                    Influenza Virus 

Vaccine (6-35 mo)                                   2021-10-01 

00:00:00            Completed                               [facility]

 

                                                    Influenza Virus 

Vaccine (6-35 mo)                                   2021-10-01 

00:00:00            Completed                               [facility]

 

                                                    Influenza Virus 

Vaccine (6-35 mo)                                   2021-10-01 

00:00:00            Completed                               [facility]

 

                                                    Influenza Virus 

Vaccine (6-35 mo)                                   2021-10-01 

00:00:00            Completed                               [facility]

 

                                                    Influenza Virus 

Vaccine (6-35 mo)                                   2021-10-01 

00:00:00            Completed                               [facility]

 

                                                    Influenza Virus 

Vaccine (6-35 mo)                                   2021-10-01 

00:00:00            Completed                               [facility]

 

                                                    Influenza Virus 

Vaccine (6-35 mo)                                   2021-10-01 

00:00:00            Completed                               [facility]

 

                                                    Influenza Virus 

Vaccine (6-35 mo)                                   2021-10-01 

00:00:00            Completed                               [facility]

 

                                                    Influenza Virus 

Vaccine (6-35 mo)                                   2021-10-01 

00:00:00            Completed                               [facility]

 

                                                    Influenza Virus 

Vaccine (6-35 mo)                                   2021-10-01 

00:00:00            Completed                               [facility]

 

                                                    Influenza Virus 

Vaccine (6-35 mo)                                   2021-10-01 

00:00:00            Completed                               [facility]

 

                                                    Influenza Virus 

Vaccine (6-35 mo)                                   2021-10-01 

00:00:00            Completed                               [facility]

 

                                                    Influenza Virus 

Vaccine (6-35 mo)                                   2021-10-01 

00:00:00            Completed                               [facility]

 

                                                    Influenza Virus 

Vaccine (6-35 mo)                                   2021-10-01 

00:00:00            Completed                               [facility]

 

                                                    Influenza Virus 

Vaccine (6-35 mo)                                   2021-10-01 

00:00:00            Completed                               [facility]

 

                                                    Influenza Virus 

Vaccine (6-35 mo)                                   2021-10-01 

00:00:00            Completed                               [facility]

 

                                                    Influenza Virus 

Vaccine (6-35 mo)                                   2021-10-01 

00:00:00            Completed                               [facility]

 

                                                    Influenza Virus 

Vaccine (6-35 mo)                                   2021-10-01 

00:00:00            Completed                               [facility]

 

                                                    Influenza Virus 

Vaccine (6-35 mo)                                   2021-10-01 

00:00:00            Completed                               [facility]

 

                                                    Influenza Virus 

Vaccine (6-35 mo)                                   2021-10-01 

00:00:00            Completed                               [facility]

 

                                                    Influenza Virus 

Vaccine (6-35 mo)                                   2021-10-01 

00:00:00            Completed                               

 

                                                    SARS-COV-2 COVID-19 

PFIZER VACCINE                                      2021 

00:00:00            Completed                               [facility]

 

                                                    SARS-COV-2 COVID-19 

PFIZER VACCINE                                      2021 

00:00:00            Completed                               [facility]

 

                                                    SARS-COV-2 COVID-19 

PFIZER VACCINE                                      2021 

00:00:00            Completed                               [facility]

 

                                                    SARS-COV-2 COVID-19 

PFIZER VACCINE                                      2021 

00:00:00            Completed                               [facility]

 

                                                    SARS-COV-2 COVID-19 

PFIZER VACCINE                                      2021 

00:00:00            Completed                               [facility]

 

                                                    SARS-COV-2 COVID-19 

PFIZER VACCINE                                      2021 

00:00:00            Completed                               [facility]

 

                                                    SARS-COV-2 COVID-19 

PFIZER VACCINE                                      2021 

00:00:00            Completed                               [facility]

 

                                                    SARS-COV-2 COVID-19 

PFIZER VACCINE                                      2021 

00:00:00            Completed                               [facility]

 

                                                    SARS-COV-2 COVID-19 

PFIZER VACCINE                                      2021 

00:00:00            Completed                               [facility]

 

                                                    SARS-COV-2 COVID-19 

PFIZER VACCINE                                      2021 

00:00:00            Completed                               [facility]

 

                                                    SARS-COV-2 COVID-19 

PFIZER VACCINE                                      2021 

00:00:00            Completed                               [facility]

 

                                                    SARS-COV-2 COVID-19 

PFIZER VACCINE                                      2021 

00:00:00            Completed                               [facility]

 

                                                    SARS-COV-2 COVID-19 

PFIZER VACCINE                                      2021 

00:00:00            Completed                               [facility]

 

                                                    SARS-COV-2 COVID-19 

PFIZER VACCINE                                      2021 

00:00:00            Completed                               [facility]

 

                                                    SARS-COV-2 COVID-19 

PFIZER VACCINE                                      2021 

00:00:00            Completed                               [facility]

 

                                                    SARS-COV-2 COVID-19 

PFIZER VACCINE                                      2021 

00:00:00            Completed                               [facility]

 

                                                    SARS-COV-2 COVID-19 

PFIZER VACCINE                                      2021 

00:00:00            Completed                               [facility]

 

                                                    SARS-COV-2 COVID-19 

PFIZER VACCINE                                      2021 

00:00:00            Completed                               [facility]

 

                                                    SARS-COV-2 COVID-19 

PFIZER VACCINE                                      2021 

00:00:00            Completed                               [facility]

 

                                                    SARS-COV-2 COVID-19 

PFIZER VACCINE                                      2021 

00:00:00            Completed                               [facility]

 

                                                    SARS-COV-2 COVID-19 

PFIZER VACCINE                                      2021 

00:00:00            Completed                               [facility]

 

                                                    SARS-COV-2 COVID-19 

PFIZER VACCINE                                      2021 

00:00:00            Completed                               [facility]

 

                                                    SARS-COV-2 COVID-19 

PFIZER VACCINE                                      2021 

00:00:00            Completed                               [facility]

 

                                                    SARS-COV-2 COVID-19 

PFIZER VACCINE                                      2021 

00:00:00            Completed                               [facility]

 

                                                    SARS-COV-2 COVID-19 

PFIZER VACCINE                                      2021 

00:00:00            Completed                               [facility]

 

                                                    SARS-COV-2 COVID-19 

PFIZER VACCINE                                      2021 

00:00:00            Completed                               [facility]

 

                                                    SARS-COV-2 COVID-19 

PFIZER VACCINE                                      2021 

00:00:00            Completed                               [facility]

 

                                                    SARS-COV-2 COVID-19 

PFIZER VACCINE                                      2021 

00:00:00            Completed                               [facility]

 

                                                    SARS-COV-2 COVID-19 

PFIZER VACCINE                                      2021 

00:00:00            Completed                               [facility]

 

                                                    SARS-COV-2 COVID-19 

PFIZER VACCINE                                      2021 

00:00:00            Completed                               [facility]

 

                                                    SARS-COV-2 COVID-19 

PFIZER VACCINE                                      2021 

00:00:00            Completed                               [facility]

 

                                                    SARS-COV-2 COVID-19 

PFIZER VACCINE                                      2021 

00:00:00            Completed                               [facility]

 

                                                    SARS-COV-2 COVID-19 

PFIZER VACCINE                                      2021 

00:00:00            Completed                               [facility]

 

                                                    SARS-COV-2 COVID-19 

PFIZER VACCINE                                      2021 

00:00:00            Completed                               [facility]

 

                                                    SARS-COV-2 COVID-19 

PFIZER VACCINE                                      2021 

00:00:00            Completed                               [facility]

 

                                                    SARS-COV-2 COVID-19 

PFIZER VACCINE                                      2021 

00:00:00            Completed                               [facility]

 

                                                    SARS-COV-2 COVID-19 

PFIZER VACCINE                                      2021 

00:00:00            Completed                               [facility]

 

                                                    SARS-COV-2 COVID-19 

PFIZER VACCINE                                      2021 

00:00:00            Completed                               [facility]

 

                                                    SARS-COV-2 COVID-19 

PFIZER VACCINE                                      2021 

00:00:00            Completed                               [facility]

 

                                                    SARS-COV-2 COVID-19 

PFIZER VACCINE                                      2021 

00:00:00            Completed                               [facility]

 

                                                    SARS-COV-2 COVID-19 

PFIZER VACCINE                                      2021 

00:00:00            Completed                               [facility]

 

                                                    SARS-COV-2 COVID-19 

PFIZER VACCINE                                      2021 

00:00:00            Completed                               [facility]

 

                                                    SARS-COV-2 COVID-19 

PFIZER VACCINE                                      2021 

00:00:00            Completed                               [facility]

 

                                                    SARS-COV-2 COVID-19 

PFIZER VACCINE                                      2021 

00:00:00            Completed                               [facility]

 

                                                    SARS-COV-2 COVID-19 

PFIZER VACCINE                                      2021 

00:00:00            Completed                               [facility]

 

                                                    SARS-COV-2 COVID-19 

PFIZER VACCINE                                      2021 

00:00:00            Completed                               [facility]

 

                                                    SARS-COV-2 COVID-19 

PFIZER VACCINE                                      2021 

00:00:00            Completed                               [facility]

 

                                                    SARS-COV-2 COVID-19 

PFIZER VACCINE                                      2021 

00:00:00            Completed                               [facility]

 

                                                    SARS-COV-2 COVID-19 

PFIZER VACCINE                                      2021 

00:00:00            Completed                               [facility]

 

                                                    SARS-COV-2 COVID-19 

PFIZER VACCINE                                      2021 

00:00:00            Completed                               [facility]

 

                                                    SARS-COV-2 COVID-19 

PFIZER VACCINE                                      2021 

00:00:00            Completed                               [facility]

 

                                                    SARS-COV-2 COVID-19 

PFIZER VACCINE                                      2021 

00:00:00            Completed                               [facility]

 

                                                    SARS-COV-2 COVID-19 

PFIZER VACCINE                                      2021 

00:00:00            Completed                               [facility]

 

                                                    SARS-COV-2 COVID-19 

PFIZER VACCINE                                      2021 

00:00:00            Completed                               [facility]

 

                                                    SARS-COV-2 COVID-19 

PFIZER VACCINE                                      2021 

00:00:00            Completed                               [facility]

 

                                                    SARS-COV-2 COVID-19 

PFIZER VACCINE                                      2021 

00:00:00            Completed                               [facility]

 

                                                    SARS-COV-2 COVID-19 

PFIZER VACCINE                                      2021 

00:00:00            Completed                               [facility]

 

                                                    SARS-COV-2 COVID-19 

PFIZER VACCINE                                      2021 

00:00:00            Completed                               [facility]

 

                                                    SARS-COV-2 COVID-19 

PFIZER VACCINE                                      2021 

00:00:00            Completed                               [facility]

 

                                                    SARS-COV-2 COVID-19 

PFIZER VACCINE                                      2021 

00:00:00            Completed                               [facility]

 

                                                    SARS-COV-2 COVID-19 

PFIZER VACCINE                                      2021 

00:00:00            Completed                               [facility]

 

                                                    SARS-COV-2 COVID-19 

PFIZER VACCINE                                      2021 

00:00:00            Completed                               [facility]

 

                                                    SARS-COV-2 COVID-19 

PFIZER VACCINE                                      2021 

00:00:00            Completed                               [facility]

 

                                                    SARS-COV-2 COVID-19 

PFIZER VACCINE                                      2021 

00:00:00            Completed                               [facility]

 

                                                    SARS-COV-2 COVID-19 

PFIZER VACCINE                                      2021 

00:00:00            Completed                               [facility]

 

                                                    SARS-COV-2 COVID-19 

PFIZER VACCINE                                      2021 

00:00:00            Completed                               [facility]

 

                                                    SARS-COV-2 COVID-19 

PFIZER VACCINE                                      2021 

00:00:00            Completed                               [facility]

 

                                                    SARS-COV-2 COVID-19 

PFIZER VACCINE                                      2021 

00:00:00            Completed                               [facility]

 

                                                    SARS-COV-2 COVID-19 

PFIZER VACCINE                                      2021 

00:00:00            Completed                               [facility]

 

                                                    SARS-COV-2 COVID-19 

PFIZER VACCINE                                      2021 

00:00:00            Completed                               [facility]

 

                                                    SARS-COV-2 COVID-19 

PFIZER VACCINE                                      2021 

00:00:00            Completed                               [facility]

 

                                                    SARS-COV-2 COVID-19 

PFIZER VACCINE                                      2021 

00:00:00            Completed                               [facility]

 

                                                    SARS-COV-2 COVID-19 

PFIZER VACCINE                                      2021 

00:00:00            Completed                               [facility]

 

                                                    SARS-COV-2 COVID-19 

PFIZER VACCINE                                      2021 

00:00:00            Completed                               [facility]

 

                                                    SARS-COV-2 COVID-19 

PFIZER VACCINE                                      2021 

00:00:00            Completed                               [facility]

 

                                                    SARS-COV-2 COVID-19 

PFIZER VACCINE                                      2021 

00:00:00            Completed                               [facility]

 

                                                    SARS-COV-2 COVID-19 

PFIZER VACCINE                                      2021 

00:00:00            Completed                               [facility]

 

                                                    SARS-COV-2 COVID-19 

PFIZER VACCINE                                      2021 

00:00:00            Completed                               [facility]

 

                                                    SARS-COV-2 COVID-19 

PFIZER VACCINE                                      2021 

00:00:00            Completed                               [facility]

 

                                                    SARS-COV-2 COVID-19 

PFIZER VACCINE                                      2021 

00:00:00            Completed                               [facility]

 

                                                    SARS-COV-2 COVID-19 

PFIZER VACCINE                                      2021 

00:00:00            Completed                               [facility]

 

                                                    SARS-COV-2 COVID-19 

PFIZER VACCINE                                      2021 

00:00:00            Completed                               [facility]

 

                                                    SARS-COV-2 COVID-19 

PFIZER VACCINE                                      2021 

00:00:00            Completed                               [facility]

 

                                                    SARS-COV-2 COVID-19 

PFIZER VACCINE                                      2021 

00:00:00            Completed                               [facility]

 

                                                    SARS-COV-2 COVID-19 

PFIZER VACCINE                                      2021 

00:00:00            Completed                               [facility]

 

                                                    SARS-COV-2 COVID-19 

PFIZER VACCINE                                      2021 

00:00:00            Completed                               [facility]

 

                                                    SARS-COV-2 COVID-19 

PFIZER VACCINE                                      2021 

00:00:00            Completed                               [facility]

 

                                                    SARS-COV-2 COVID-19 

PFIZER VACCINE                                      2021 

00:00:00            Completed                               [facility]

 

                                                    SARS-COV-2 COVID-19 

PFIZER VACCINE                                      2021 

00:00:00            Completed                               [facility]

 

                                                    SARS-COV-2 COVID-19 

PFIZER VACCINE                                      2021 

00:00:00            Completed                               [facility]

 

                                                    SARS-COV-2 COVID-19 

PFIZER VACCINE                                      2021 

00:00:00            Completed                               [facility]

 

                                                    SARS-COV-2 COVID-19 

PFIZER VACCINE                                      2021 

00:00:00            Completed                               [facility]

 

                                                    SARS-COV-2 COVID-19 

PFIZER VACCINE                                      2021 

00:00:00            Completed                               [facility]

 

                                                    SARS-COV-2 COVID-19 

PFIZER VACCINE                                      2021 

00:00:00            Completed                               [facility]

 

                                                    SARS-COV-2 COVID-19 

PFIZER VACCINE                                      2021 

00:00:00            Completed                               [facility]

 

                                                    SARS-COV-2 COVID-19 

PFIZER VACCINE                                      2021 

00:00:00            Completed                               [facility]

 

                                                    Pneumococcal 

Polysaccharide, 

PPSV23 (PNEUMOVAX)                                  2021 

00:00:00            Completed                               [facility]

 

                                                    Influenza Virus 

Vaccine Quad .5 mL 

IM 6+ MO                                            2021 

00:00:00            Completed                               [facility]

 

                                                    Pneumococcal 

Polysaccharide, 

PPSV23 (PNEUMOVAX)                                  2021 

00:00:00            Completed                               [facility]

 

                                                    Influenza Virus 

Vaccine Quad .5 mL 

IM 6+ MO                                            2021 

00:00:00            Completed                               [facility]

 

                                                    Pneumococcal 

Polysaccharide, 

PPSV23 (PNEUMOVAX)                                  2021 

00:00:00            Completed                               [facility]

 

                                                    Influenza Virus 

Vaccine Quad .5 mL 

IM 6+ MO                                            2021 

00:00:00            Completed                               [facility]

 

                                                    Pneumococcal 

Polysaccharide, 

PPSV23 (PNEUMOVAX)                                  2021 

00:00:00            Completed                               [facility]

 

                                                    Influenza Virus 

Vaccine Quad .5 mL 

IM 6+ MO                                            2021 

00:00:00            Completed                               [facility]

 

                                                    Pneumococcal 

Polysaccharide, 

PPSV23 (PNEUMOVAX)                                  2021 

00:00:00            Completed                               [facility]

 

                                                    Influenza Virus 

Vaccine Quad .5 mL 

IM 6+ MO                                            2021 

00:00:00            Completed                               [facility]

 

                                                    Pneumococcal 

Polysaccharide, 

PPSV23 (PNEUMOVAX)                                  2021 

00:00:00            Completed                               [facility]

 

                                                    Influenza Virus 

Vaccine Quad .5 mL 

IM 6+ MO                                            2021 

00:00:00            Completed                               [facility]

 

                                                    Pneumococcal 

Polysaccharide, 

PPSV23 (PNEUMOVAX)                                  2021 

00:00:00            Completed                               [facility]

 

                                                    Influenza Virus 

Vaccine Quad .5 mL 

IM 6+ MO                                            2021 

00:00:00            Completed                               [facility]

 

                                                    Pneumococcal 

Polysaccharide, 

PPSV23 (PNEUMOVAX)                                  2021 

00:00:00            Completed                               [facility]

 

                                                    Influenza Virus 

Vaccine Quad .5 mL 

IM 6+ MO                                            2021 

00:00:00            Completed                               [facility]

 

                                                    Pneumococcal 

Polysaccharide, 

PPSV23 (PNEUMOVAX)                                  2021 

00:00:00            Completed                               [facility]

 

                                                    Influenza Virus 

Vaccine Quad .5 mL 

IM 6+ MO                                            2021 

00:00:00            Completed                               [facility]

 

                                                    Pneumococcal 

Polysaccharide, 

PPSV23 (PNEUMOVAX)                                  2021 

00:00:00            Completed                               [facility]

 

                                                    Influenza Virus 

Vaccine Quad .5 mL 

IM 6+ MO                                            2021 

00:00:00            Completed                               [facility]

 

                                                    Pneumococcal 

Polysaccharide, 

PPSV23 (PNEUMOVAX)                                  2021 

00:00:00            Completed                               [facility]

 

                                                    Influenza Virus 

Vaccine Quad .5 mL 

IM 6+ MO                                            2021 

00:00:00            Completed                               [facility]

 

                                                    Pneumococcal 

Polysaccharide, 

PPSV23 (PNEUMOVAX)                                  2021 

00:00:00            Completed                               [facility]

 

                                                    Influenza Virus 

Vaccine Quad .5 mL 

IM 6+ MO                                            2021 

00:00:00            Completed                               [facility]

 

                                                    Pneumococcal 

Polysaccharide, 

PPSV23 (PNEUMOVAX)                                  2021 

00:00:00            Completed                               [facility]

 

                                                    Influenza Virus 

Vaccine Quad .5 mL 

IM 6+ MO                                            2021 

00:00:00            Completed                               [facility]

 

                                                    Pneumococcal 

Polysaccharide, 

PPSV23 (PNEUMOVAX)                                  2021 

00:00:00            Completed                               [facility]

 

                                                    Influenza Virus 

Vaccine Quad .5 mL 

IM 6+ MO                                            2021 

00:00:00            Completed                               [facility]

 

                                                    Pneumococcal 

Polysaccharide, 

PPSV23 (PNEUMOVAX)                                  2021 

00:00:00            Completed                               [facility]

 

                                                    Influenza Virus 

Vaccine Quad .5 mL 

IM 6+ MO                                            2021 

00:00:00            Completed                               [facility]

 

                                                    Pneumococcal 

Polysaccharide, 

PPSV23 (PNEUMOVAX)                                  2021 

00:00:00            Completed                               [facility]

 

                                                    Influenza Virus 

Vaccine Quad .5 mL 

IM 6+ MO                                            2021 

00:00:00            Completed                               [facility]

 

                                                    Pneumococcal 

Polysaccharide, 

PPSV23 (PNEUMOVAX)                                  2021 

00:00:00            Completed                               [facility]

 

                                                    Influenza Virus 

Vaccine Quad .5 mL 

IM 6+ MO                                            2021 

00:00:00            Completed                               [facility]

 

                                                    Pneumococcal 

Polysaccharide, 

PPSV23 (PNEUMOVAX)                                  2021 

00:00:00            Completed                               [facility]

 

                                                    Influenza Virus 

Vaccine Quad .5 mL 

IM 6+ MO                                            2021 

00:00:00            Completed                               [facility]

 

                                                    Pneumococcal 

Polysaccharide, 

PPSV23 (PNEUMOVAX)                                  2021 

00:00:00            Completed                               [facility]

 

                                                    Influenza Virus 

Vaccine Quad .5 mL 

IM 6+ MO                                            2021 

00:00:00            Completed                               [facility]

 

                                                    Pneumococcal 

Polysaccharide, 

PPSV23 (PNEUMOVAX)                                  2021 

00:00:00            Completed                               [facility]

 

                                                    Influenza Virus 

Vaccine Quad .5 mL 

IM 6+ MO                                            2021 

00:00:00            Completed                               [facility]

 

                                                    Pneumococcal 

Polysaccharide, 

PPSV23 (PNEUMOVAX)                                  2021 

00:00:00            Completed                               [facility]

 

                                                    Influenza Virus 

Vaccine Quad .5 mL 

IM 6+ MO                                            2021 

00:00:00            Completed                               [facility]

 

                                                    Pneumococcal 

Polysaccharide, 

PPSV23 (PNEUMOVAX)                                  2021 

00:00:00            Completed                               [facility]

 

                                                    Influenza Virus 

Vaccine Quad .5 mL 

IM 6+ MO                                            2021 

00:00:00            Completed                               [facility]

 

                                                    Pneumococcal 

Polysaccharide, 

PPSV23 (PNEUMOVAX)                                  2021 

00:00:00            Completed                               [facility]

 

                                                    Influenza Virus 

Vaccine Quad .5 mL 

IM 6+ MO                                            2021 

00:00:00            Completed                               [facility]

 

                                                    Pneumococcal 

Polysaccharide, 

PPSV23 (PNEUMOVAX)                                  2021 

00:00:00            Completed                               [facility]

 

                                                    Influenza Virus 

Vaccine Quad .5 mL 

IM 6+ MO                                            2021 

00:00:00            Completed                               [facility]

 

                                                    Pneumococcal 

Polysaccharide, 

PPSV23 (PNEUMOVAX)                                  2021 

00:00:00            Completed                               [facility]

 

                                                    Influenza Virus 

Vaccine Quad .5 mL 

IM 6+ MO                                            2021 

00:00:00            Completed                               [facility]

 

                                                    Pneumococcal 

Polysaccharide, 

PPSV23 (PNEUMOVAX)                                  2021 

00:00:00            Completed                               [facility]

 

                                                    Influenza Virus 

Vaccine Quad .5 mL 

IM 6+ MO                                            2021 

00:00:00            Completed                               [facility]

 

                                                    Pneumococcal 

Polysaccharide, 

PPSV23 (PNEUMOVAX)                                  2021 

00:00:00            Completed                               [facility]

 

                                                    Influenza Virus 

Vaccine Quad .5 mL 

IM 6+ MO                                            2021 

00:00:00            Completed                               [facility]

 

                                                    Pneumococcal 

Polysaccharide, 

PPSV23 (PNEUMOVAX)                                  2021 

00:00:00            Completed                               [facility]

 

                                                    Influenza Virus 

Vaccine Quad .5 mL 

IM 6+ MO                                            2021 

00:00:00            Completed                               [facility]

 

                                                    Pneumococcal 

Polysaccharide, 

PPSV23 (PNEUMOVAX)                                  2021 

00:00:00            Completed                               [facility]

 

                                                    Influenza Virus 

Vaccine Quad .5 mL 

IM 6+ MO                                            2021 

00:00:00            Completed                               [facility]

 

                                                    Pneumococcal 

Polysaccharide, 

PPSV23 (PNEUMOVAX)                                  2021 

00:00:00            Completed                               [facility]

 

                                                    Influenza Virus 

Vaccine Quad .5 mL 

IM 6+ MO                                            2021 

00:00:00            Completed                               [facility]

 

                                                    Pneumococcal 

Polysaccharide, 

PPSV23 (PNEUMOVAX)                                  2021 

00:00:00            Completed                               [facility]

 

                                                    Influenza Virus 

Vaccine Quad .5 mL 

IM 6+ MO                                            2021 

00:00:00            Completed                               [facility]

 

                                                    Pneumococcal 

Polysaccharide, 

PPSV23 (PNEUMOVAX)                                  2021 

00:00:00            Completed                               [facility]

 

                                                    Influenza Virus 

Vaccine Quad .5 mL 

IM 6+ MO                                            2021 

00:00:00            Completed                               [facility]

 

                                                    Pneumococcal 

Polysaccharide, 

PPSV23 (PNEUMOVAX)                                  2021 

00:00:00            Completed                               [facility]

 

                                                    Influenza Virus 

Vaccine Quad .5 mL 

IM 6+ MO                                            2021 

00:00:00            Completed                               [facility]

 

                                                    Pneumococcal 

Polysaccharide, 

PPSV23 (PNEUMOVAX)                                  2021 

00:00:00            Completed                               [facility]

 

                                                    Influenza Virus 

Vaccine Quad .5 mL 

IM 6+ MO                                            2021 

00:00:00            Completed                               [facility]

 

                                                    Pneumococcal 

Polysaccharide, 

PPSV23 (PNEUMOVAX)                                  2021 

00:00:00            Completed                               [facility]

 

                                                    Influenza Virus 

Vaccine Quad .5 mL 

IM 6+ MO                                            2021 

00:00:00            Completed                               [facility]

 

                                                    Pneumococcal 

Polysaccharide, 

PPSV23 (PNEUMOVAX)                                  2021 

00:00:00            Completed                               [facility]

 

                                                    Influenza Virus 

Vaccine Quad .5 mL 

IM 6+ MO                                            2021 

00:00:00            Completed                               [facility]

 

                                                    Pneumococcal 

Polysaccharide, 

PPSV23 (PNEUMOVAX)                                  2021 

00:00:00            Completed                               [facility]

 

                                                    Influenza Virus 

Vaccine Quad .5 mL 

IM 6+ MO                                            2021 

00:00:00            Completed                               [facility]

 

                                                    Pneumococcal 

Polysaccharide, 

PPSV23 (PNEUMOVAX)                                  2021 

00:00:00            Completed                               [facility]

 

                                                    Influenza Virus 

Vaccine Quad .5 mL 

IM 6+ MO                                            2021 

00:00:00            Completed                               [facility]

 

                                                    Pneumococcal 

Polysaccharide, 

PPSV23 (PNEUMOVAX)                                  2021 

00:00:00            Completed                               [facility]

 

                                                    Influenza Virus 

Vaccine Quad .5 mL 

IM 6+ MO                                            2021 

00:00:00            Completed                               [facility]

 

                                                    Pneumococcal 

Polysaccharide, 

PPSV23 (PNEUMOVAX)                                  2021 

00:00:00            Completed                               [facility]

 

                                                    Influenza Virus 

Vaccine Quad .5 mL 

IM 6+ MO                                            2021 

00:00:00            Completed                               [facility]

 

                                                    Pneumococcal 

Polysaccharide, 

PPSV23 (PNEUMOVAX)                                  2021 

00:00:00            Completed                               [facility]

 

                                                    Influenza Virus 

Vaccine Quad .5 mL 

IM 6+ MO                                            2021 

00:00:00            Completed                               [facility]

 

                                                    Pneumococcal 

Polysaccharide, 

PPSV23 (PNEUMOVAX)                                  2021 

00:00:00            Completed                               [facility]

 

                                                    Influenza Virus 

Vaccine Quad .5 mL 

IM 6+ MO                                            2021 

00:00:00            Completed                               [facility]

 

                                                    Pneumococcal 

Polysaccharide, 

PPSV23 (PNEUMOVAX)                                  2021 

00:00:00            Completed                               [facility]

 

                                                    Influenza Virus 

Vaccine Quad .5 mL 

IM 6+ MO                                            2021 

00:00:00            Completed                               [facility]

 

                                                    Pneumococcal 

Polysaccharide, 

PPSV23 (PNEUMOVAX)                                  2021 

00:00:00            Completed                               [facility]

 

                                                    Influenza Virus 

Vaccine Quad .5 mL 

IM 6+ MO                                            2021 

00:00:00            Completed                               [facility]

 

                                                    Pneumococcal 

Polysaccharide, 

PPSV23 (PNEUMOVAX)                                  2021 

00:00:00            Completed                               [facility]

 

                                                    Influenza Virus 

Vaccine Quad .5 mL 

IM 6+ MO                                            2021 

00:00:00            Completed                               [facility]

 

                                                    Pneumococcal 

Polysaccharide, 

PPSV23 (PNEUMOVAX)                                  2021 

00:00:00            Completed                               [facility]

 

                                                    Influenza Virus 

Vaccine Quad .5 mL 

IM 6+ MO                                            2021 

00:00:00            Completed                               [facility]

 

                                                    Pneumococcal 

Polysaccharide, 

PPSV23 (PNEUMOVAX)                                  2021 

00:00:00            Completed                               [facility]

 

                                                    Influenza Virus 

Vaccine Quad .5 mL 

IM 6+ MO                                            2021 

00:00:00            Completed                               [facility]

 

                                                    Pneumococcal 

Polysaccharide, 

PPSV23 (PNEUMOVAX)                                  2021 

00:00:00            Completed                               [facility]

 

                                                    Influenza Virus 

Vaccine Quad .5 mL 

IM 6+ MO 

(FLUZONE/FLULAVAL/F

LUARIX)                                             2021 

00:00:00            Completed                               

 

                                                    Pneumococcal 

Polysaccharide, 

PPSV23 (PNEUMOVAX)              Unknown      Completed                 [facility]

 

                                                    Influenza Virus 

Vaccine Quad .5 mL 

IM 6+ MO 

(FLUZONE/FLULAVAL/F

LUARIX)                   Unknown      Completed                 [facility]

 

                                                    SARS-COV-2 COVID-19 

PFIZER VACCINE              Unknown      Completed                 [facility]

 

                                                    Influenza Virus 

Vaccine (6-35 mo)              Unknown      Completed                 [facility]

 

                                                    Influenza Virus 

Vaccine Quad IM, 

Preserv and ABX 

Free 6 MO-64 YRS 

(FLUCELVAX)               Unknown      Completed                 [facility]

 

                                                    SARS-COV-2 COVID-19 

CLAUDIA-SUCROSE 

VACCINE 12 YRS+, 

BIVALENT 0.3ML, IM, 

(PFIZER GRAY TOP)              Unknown      Completed                 [facility]

 

                    TDAP                Unknown   Completed           [facility]

 

                                                    Pneumococcal 

Polysaccharide, 

PPSV23 (PNEUMOVAX)              Unknown      Completed                 [facility]

 

                                                    Influenza Virus 

Vaccine Quad .5 mL 

IM 6+ MO 

(FLUZONE/FLULAVAL/F

LUARIX)                   Unknown      Completed                 [facility]

 

                                                    SARS-COV-2 COVID-19 

PFIZER VACCINE              Unknown      Completed                 [facility]

 

                                                    Influenza Virus 

Vaccine (6-35 mo)              Unknown      Completed                 [facility]

 

                                                    Pneumococcal 

Polysaccharide, 

PPSV23 (PNEUMOVAX)              Unknown      Completed                 [facility]

 

                                                    Influenza Virus 

Vaccine Quad .5 mL 

IM 6+ MO 

(FLUZONE/FLULAVAL/F

LUARIX)                   Unknown      Completed                 [facility]

 

                                                    SARS-COV-2 COVID-19 

PFIZER VACCINE              Unknown      Completed                 [facility]

 

                                                    Influenza Virus 

Vaccine (6-35 mo)              Unknown      Completed                 [facility]

 

                                                    Pneumococcal 

Polysaccharide, 

PPSV23 (PNEUMOVAX)              Unknown      Completed                 [facility]

 

                                                    Influenza Virus 

Vaccine Quad .5 mL 

IM 6+ MO 

(FLUZONE/FLULAVAL/F

LUARIX)                   Unknown      Completed                 [facility]

 

                                                    SARS-COV-2 COVID-19 

PFIZER VACCINE              Unknown      Completed                 [facility]

 

                                                    Pneumococcal 

Polysaccharide, 

PPSV23 (PNEUMOVAX)              Unknown      Completed                 [facility]

 

                                                    Influenza Virus 

Vaccine Quad .5 mL 

IM 6+ MO 

(FLUZONE/FLULAVAL/F

LUARIX)                   Unknown      Completed                 [facility]

 

                                                    SARS-COV-2 COVID-19 

PFIZER VACCINE              Unknown      Completed                 [facility]

 

                                                    Pneumococcal 

Polysaccharide, 

PPSV23 (PNEUMOVAX)              Unknown      Completed                 [facility]

 

                                                    Influenza Virus 

Vaccine Quad .5 mL 

IM 6+ MO 

(FLUZONE/FLULAVAL/F

LUARIX)                   Unknown      Completed                 [facility]

 

                                                    SARS-COV-2 COVID-19 

PFIZER VACCINE              Unknown      Completed                 [facility]

 

                                                    Pneumococcal 

Polysaccharide, 

PPSV23 (PNEUMOVAX)              Unknown      Completed                 [facility]

 

                                                    Influenza Virus 

Vaccine Quad .5 mL 

IM 6+ MO 

(FLUZONE/FLULAVAL/F

LUARIX)                   Unknown      Completed                 [facility]

 

                                                    SARS-COV-2 COVID-19 

PFIZER VACCINE              Unknown      Completed                 [facility]

 

                                                    Pneumococcal 

Polysaccharide, 

PPSV23 (PNEUMOVAX)              Unknown      Completed                 [facility]

 

                                                    Influenza Virus 

Vaccine Quad .5 mL 

IM 6+ MO 

(FLUZONE/FLULAVAL/F

LUARIX)                   Unknown      Completed                 [facility]

 

                                                    SARS-COV-2 COVID-19 

PFIZER VACCINE              Unknown      Completed                 [facility]

 

                                                    Pneumococcal 

Polysaccharide, 

PPSV23 (PNEUMOVAX)              Unknown      Completed                 [facility]

 

                                                    Influenza Virus 

Vaccine Quad .5 mL 

IM 6+ MO 

(FLUZONE/FLULAVAL/F

LUARIX)                   Unknown      Completed                 [facility]

 

                                                    SARS-COV-2 COVID 19 

CLAUDIA SUCROSE 

VACCINE 2023-2024, 0.3 ML 

(30 MCG), IM PFIZER 

(GRAY TOP)                Unknown      Completed                 [facility]

 

                                                    Pneumococcal 

Polysaccharide, 

PPSV23 (PNEUMOVAX)              Unknown      Completed                 [facility]

 

                                                    Influenza Virus 

Vaccine Quad .5 mL 

IM 6+ MO 

(FLUZONE/FLULAVAL/F

LUARIX)                   Unknown      Completed                 [facility]

 

                                                    SARS-COV-2 COVID-19 

PFIZER VACCINE              Unknown      Completed                 [facility]

 

                                                    Influenza Virus 

Vaccine (6-35 mo)              Unknown      Completed                 [facility]

 

                                                    Influenza Virus 

Vaccine Quad IM, 

Preserv and ABX 

Free 6 MO-64 YRS 

(FLUCELVAX)               Unknown      Completed                 [facility]

 

                                                    SARS-COV-2 COVID-19 

CLAUDIA-SUCROSE 

VACCINE 12 YRS+, 

BIVALENT 0.3ML, IM, 

(PFIZER GRAY TOP)              Unknown      Completed                 [facility]

 

                    TDAP                Unknown   Completed           [facility]

 

                                                    SARS-COV-2 COVID 19 

CLAUDIA SUCROSE 

VACCINE , 

0505-2485, 0.3 ML 

(30 MCG), IM PFIZER 

(GRAY TOP)                Unknown      Completed                 [facility]

 

                                                    Pneumococcal 

Polysaccharide, 

PPSV23 (PNEUMOVAX)              Unknown      Completed                 [facility]

 

                                                    Influenza Virus 

Vaccine Quad .5 mL 

IM 6+ MO 

(FLUZONE/FLULAVAL/F

LUARIX)                   Unknown      Completed                 [facility]

 

                                                    SARS-COV-2 COVID-19 

PFIZER VACCINE              Unknown      Completed                 [facility]

 

                                                    Influenza Virus 

Vaccine (6-35 mo)              Unknown      Completed                 [facility]

 

                                                    Influenza Virus 

Vaccine Quad IM, 

Preserv and ABX 

Free 6 MO-64 YRS 

(FLUCELVAX)               Unknown      Completed                 [facility]

 

                                                    SARS-COV-2 COVID-19 

CLAUDIA-SUCROSE 

VACCINE 12 YRS+, 

BIVALENT 0.3ML, IM, 

(PFIZER GRAY TOP)              Unknown      Completed                 [facility]

 

                    TDAP                Unknown   Completed           [facility]

 

                                                    Pneumococcal 

Polysaccharide, 

PPSV23 (PNEUMOVAX)              Unknown      Completed                 [facility]

 

                                                    Influenza Virus 

Vaccine Quad .5 mL 

IM 6+ MO 

(FLUZONE/FLULAVAL/F

LUARIX)                   Unknown      Completed                 [facility]

 

                                                    SARS-COV-2 COVID-19 

PFIZER VACCINE              Unknown      Completed                 [facility]

 

                                                    Influenza Virus 

Vaccine (6-35 mo)              Unknown      Completed                 [facility]

 

                                                    Influenza Virus 

Vaccine Quad IM, 

Preserv and ABX 

Free 6 MO-64 YRS 

(FLUCELVAX)               Unknown      Completed                 [facility]

 

                                                    SARS-COV-2 COVID-19 

CLAUDIA-SUCROSE 

VACCINE 12 YRS+, 

BIVALENT 0.3ML, IM, 

(PFIZER GRAY TOP)              Unknown      Completed                 [facility]

 

                    TDAP                Unknown   Completed           [facility]

 

                                                    SARS-COV-2 COVID 19 

CLAUDIA SUCROSE 

VACCINE 12+, 

0091-7822, 0.3 ML 

(30 MCG), IM PFIZER 

(GRAY TOP)                Unknown      Completed                 [facility]

 

                                                    Pneumococcal 

Polysaccharide, 

PPSV23 (PNEUMOVAX)              Unknown      Completed                 [facility]

 

                                                    Influenza Virus 

Vaccine Quad .5 mL 

IM 6+ MO 

(FLUZONE/FLULAVAL/F

LUARIX)                   Unknown      Completed                 [facility]

 

                                                    SARS-COV-2 COVID-19 

PFIZER VACCINE              Unknown      Completed                 [facility]

 

                                                    Influenza Virus 

Vaccine (6-35 mo)              Unknown      Completed                 [facility]

 

                                                    Influenza Virus 

Vaccine Quad IM, 

Preserv and ABX 

Free 6 MO-64 YRS 

(FLUCELVAX)               Unknown      Completed                 [facility]

 

                                                    SARS-COV-2 COVID-19 

CLAUDIA-SUCROSE 

VACCINE 12 YRS+, 

BIVALENT 0.3ML, IM, 

(PFIZER GRAY TOP)              Unknown      Completed                 [facility]

 

                    TDAP                Unknown   Completed           [facility]

 

                                                    SARS-COV-2 COVID 19 

CLAUDIA SUCROSE 

VACCINE 12+, 

3108-4343, 0.3 ML 

(30 MCG), IM PFIZER 

(GRAY TOP)                Unknown      Completed                 [facility]

 

                                                    Pneumococcal 

Polysaccharide, 

PPSV23 (PNEUMOVAX)              Unknown      Completed                 [facility]

 

                                                    Influenza Virus 

Vaccine Quad .5 mL 

IM 6+ MO 

(FLUZONE/FLULAVAL/F

LUARIX)                   Unknown      Completed                 [facility]

 

                                                    SARS-COV-2 COVID-19 

PFIZER VACCINE              Unknown      Completed                 [facility]

 

                                                    Influenza Virus 

Vaccine (6-35 mo)              Unknown      Completed                 [facility]

 

                                                    Influenza Virus 

Vaccine Quad IM, 

Preserv and ABX 

Free 6 MO-64 YRS 

(FLUCELVAX)               Unknown      Completed                 [facility]

 

                                                    SARS-COV-2 COVID-19 

CLAUDIA-SUCROSE 

VACCINE 12 YRS+, 

BIVALENT 0.3ML, IM, 

(PFIZER GRAY TOP)              Unknown      Completed                 [facility]

 

                    TDAP                Unknown   Completed           [facility]

 

                                                    SARS-COV-2 COVID 19 

CLAUDIA SUCROSE 

VACCINE 12+, 

8398-1093, 0.3 ML 

(30 MCG), IM PFIZER 

(GRAY TOP)                Unknown      Completed                 [facility]

 

                                                    Pneumococcal 

Polysaccharide, 

PPSV23 (PNEUMOVAX)              Unknown      Completed                 [facility]

 

                                                    Influenza Virus 

Vaccine Quad .5 mL 

IM 6+ MO 

(FLUZONE/FLULAVAL/F

LUARIX)                   Unknown      Completed                 [facility]

 

                                                    SARS-COV-2 COVID-19 

PFIZER VACCINE              Unknown      Completed                 [facility]

 

                                                    Influenza Virus 

Vaccine (6-35 mo)              Unknown      Completed                 [facility]

 

                                                    Influenza Virus 

Vaccine Quad IM, 

Preserv and ABX 

Free 6 MO-64 YRS 

(FLUCELVAX)               Unknown      Completed                 [facility]

 

                                                    SARS-COV-2 COVID-19 

CLAUDIA-SUCROSE 

VACCINE 12 YRS+, 

BIVALENT 0.3ML, IM, 

(PFIZER GRAY TOP)              Unknown      Completed                 [facility]

 

                    TDAP                Unknown   Completed           [facility]

 

                                                    SARS-COV-2 COVID 19 

CLAUDIA SUCROSE 

VACCINE 12+, 

9448-0045, 0.3 ML 

(30 MCG), IM PFIZER 

(GRAY TOP)                Unknown      Completed                 [facility]

 

                                                    Pneumococcal 

Polysaccharide, 

PPSV23 (PNEUMOVAX)              Unknown      Completed                 [facility]

 

                                                    Influenza Virus 

Vaccine Quad .5 mL 

IM 6+ MO 

(FLUZONE/FLULAVAL/F

LUARIX)                   Unknown      Completed                 [facility]

 

                                                    SARS-COV-2 COVID-19 

PFIZER VACCINE              Unknown      Completed                 [facility]

 

                                                    Influenza Virus 

Vaccine (6-35 mo)              Unknown      Completed                 [facility]

 

                                                    Influenza Virus 

Vaccine Quad IM, 

Preserv and ABX 

Free 6 MO-64 YRS 

(FLUCELVAX)               Unknown      Completed                 [facility]

 

                                                    SARS-COV-2 COVID-19 

CLAUDIA-SUCROSE 

VACCINE 12 YRS+, 

BIVALENT 0.3ML, IM, 

(PFIZER GRAY TOP)              Unknown      Completed                 [facility]

 

                    TDAP                Unknown   Completed           [facility]

 

                                                    SARS-COV-2 COVID 19 

CLAUDIA SUCROSE 

VACCINE 12+, 

3546-4029, 0.3 ML 

(30 MCG), IM PFIZER 

(GRAY TOP)                Unknown      Completed                 [facility]

 

                                                    Pneumococcal 

Polysaccharide, 

PPSV23 (PNEUMOVAX)              Unknown      Completed                 [facility]

 

                                                    Influenza Virus 

Vaccine Quad .5 mL 

IM 6+ MO 

(FLUZONE/FLULAVAL/F

LUARIX)                   Unknown      Completed                 [facility]

 

                                                    SARS-COV-2 COVID-19 

PFIZER VACCINE              Unknown      Completed                 [facility]

 

                                                    Influenza Virus 

Vaccine (6-35 mo)              Unknown      Completed                 [facility]

 

                                                    Influenza Virus 

Vaccine Quad IM, 

Preserv and ABX 

Free 6 MO-64 YRS 

(FLUCELVAX)               Unknown      Completed                 [facility]

 

                                                    SARS-COV-2 COVID-19 

CLAUDIA-SUCROSE 

VACCINE 12 YRS+, 

BIVALENT 0.3ML, IM, 

(PFIZER GRAY TOP)              Unknown      Completed                 [facility]

 

                    TDAP                Unknown   Completed           [facility]

 

                                                    SARS-COV-2 COVID 19 

CLAUDIA SUCROSE 

VACCINE 12+, 

1519-5656, 0.3 ML 

(30 MCG), IM PFIZER 

(GRAY TOP)                Unknown      Completed                 [facility]

 

                                                    Pneumococcal 

Polysaccharide, 

PPSV23 (PNEUMOVAX)              Unknown      Completed                 [facility]

 

                                                    Influenza Virus 

Vaccine Quad .5 mL 

IM 6+ MO 

(FLUZONE/FLULAVAL/F

LUARIX)                   Unknown      Completed                 [facility]

 

                                                    SARS-COV-2 COVID-19 

PFIZER VACCINE              Unknown      Completed                 [facility]

 

                                                    Influenza Virus 

Vaccine (6-35 mo)              Unknown      Completed                 [facility]

 

                                                    Influenza Virus 

Vaccine Quad IM, 

Preserv and ABX 

Free 6 MO-64 YRS 

(FLUCELVAX)               Unknown      Completed                 [facility]

 

                                                    SARS-COV-2 COVID-19 

CLAUDIA-SUCROSE 

VACCINE 12 YRS+, 

BIVALENT 0.3ML, IM, 

(PFIZER GRAY TOP)              Unknown      Completed                 [facility]

 

                    TDAP                Unknown   Completed           [facility]

 

                                                    SARS-COV-2 COVID 19 

CLAUDIA SUCROSE 

VACCINE 12+, 

5878-5076, 0.3 ML 

(30 MCG), IM PFIZER 

(GRAY TOP)                Unknown      Completed                 [facility]

 

                                                    Pneumococcal 

Polysaccharide, 

PPSV23 (PNEUMOVAX)              Unknown      Completed                 [facility]

 

                                                    Influenza Virus 

Vaccine Quad .5 mL 

IM 6+ MO 

(FLUZONE/FLULAVAL/F

LUARIX)                   Unknown      Completed                 [facility]

 

                                                    SARS-COV-2 COVID-19 

PFIZER VACCINE              Unknown      Completed                 [facility]

 

                                                    Influenza Virus 

Vaccine (6-35 mo)              Unknown      Completed                 [facility]

 

                                                    Influenza Virus 

Vaccine Quad IM, 

Preserv and ABX 

Free 6 MO-64 YRS 

(FLUCELVAX)               Unknown      Completed                 [facility]

 

                                                    SARS-COV-2 COVID-19 

CLAUDIA-SUCROSE 

VACCINE 12 YRS+, 

BIVALENT 0.3ML, IM, 

(PFIZER GRAY TOP)              Unknown      Completed                 [facility]

 

                    TDAP                Unknown   Completed           [facility]

 

                                                    SARS-COV-2 COVID 19 

CLAUDIA SUCROSE 

VACCINE 12+, 

3522-9315, 0.3 ML 

(30 MCG), IM PFIZER 

(GRAY TOP)                Unknown      Completed                 [facility]

 

                                                    Pneumococcal 

Polysaccharide, 

PPSV23 (PNEUMOVAX)              Unknown      Completed                 [facility]

 

                                                    Influenza Virus 

Vaccine Quad .5 mL 

IM 6+ MO 

(FLUZONE/FLULAVAL/F

LUARIX)                   Unknown      Completed                 [facility]

 

                                                    SARS-COV-2 COVID-19 

PFIZER VACCINE              Unknown      Completed                 [facility]

 

                                                    Influenza Virus 

Vaccine (6-35 mo)              Unknown      Completed                 [facility]

 

                                                    Influenza Virus 

Vaccine Quad IM, 

Preserv and ABX 

Free 6 MO-64 YRS 

(FLUCELVAX)               Unknown      Completed                 [facility]

 

                                                    SARS-COV-2 COVID-19 

CLAUDIA-SUCROSE 

VACCINE 12 YRS+, 

BIVALENT 0.3ML, IM, 

(PFIZER GRAY TOP)              Unknown      Completed                 [facility]

 

                    TDAP                Unknown   Completed           [facility]

 

                                                    SARS-COV-2 COVID 19 

CLAUDIA SUCROSE 

VACCINE 12+, 

5560-3259, 0.3 ML 

(30 MCG), IM PFIZER 

(GRAY TOP)                Unknown      Completed                 [facility]

 

                                                    Pneumococcal 

Polysaccharide, 

PPSV23 (PNEUMOVAX)              Unknown      Completed                 [facility]

 

                                                    Influenza Virus 

Vaccine Quad .5 mL 

IM 6+ MO 

(FLUZONE/FLULAVAL/F

LUARIX)                   Unknown      Completed                 [facility]

 

                                                    SARS-COV-2 COVID-19 

PFIZER VACCINE              Unknown      Completed                 [facility]

 

                                                    Influenza Virus 

Vaccine (6-35 mo)              Unknown      Completed                 [facility]

 

                                                    Influenza Virus 

Vaccine Quad IM, 

Preserv and ABX 

Free 6 MO-64 YRS 

(FLUCELVAX)               Unknown      Completed                 [facility]

 

                                                    SARS-COV-2 COVID-19 

CLAUDIA-SUCROSE 

VACCINE 12 YRS+, 

BIVALENT 0.3ML, IM, 

(PFIZER GRAY TOP)              Unknown      Completed                 [facility]

 

                    TDAP                Unknown   Completed           [facility]

 

                                                    SARS-COV-2 COVID 19 

CLAUDIA SUCROSE 

VACCINE 12+, 

1038-9779, 0.3 ML 

(30 MCG), IM PFIZER 

(GRAY TOP)                Unknown      Completed                 [facility]

 

                                                    Pneumococcal 

Polysaccharide, 

PPSV23 (PNEUMOVAX)              Unknown      Completed                 [facility]

 

                                                    Influenza Virus 

Vaccine Quad .5 mL 

IM 6+ MO 

(FLUZONE/FLULAVAL/F

LUARIX)                   Unknown      Completed                 [facility]

 

                                                    SARS-COV-2 COVID-19 

PFIZER VACCINE              Unknown      Completed                 [facility]

 

                                                    Influenza Virus 

Vaccine (6-35 mo)              Unknown      Completed                 [facility]

 

                                                    Influenza Virus 

Vaccine Quad IM, 

Preserv and ABX 

Free 6 MO-64 YRS 

(FLUCELVAX)               Unknown      Completed                 [facility]

 

                                                    SARS-COV-2 COVID-19 

CLAUDIA-SUCROSE 

VACCINE 12 YRS+, 

BIVALENT 0.3ML, IM, 

(PFIZER GRAY TOP)              Unknown      Completed                 [facility]

 

                    TDAP                Unknown   Completed           [facility]

 

                                                    SARS-COV-2 COVID 19 

CLAUDIA SUCROSE 

VACCINE 12+, 

6708-3875, 0.3 ML 

(30 MCG), IM PFIZER 

(GRAY TOP)                Unknown      Completed                 [facility]

 

                                                    Pneumococcal 

Polysaccharide, 

PPSV23 (PNEUMOVAX)              Unknown      Completed                 [facility]

 

                                                    Influenza Virus 

Vaccine Quad .5 mL 

IM 6+ MO 

(FLUZONE/FLULAVAL/F

LUARIX)                   Unknown      Completed                 [facility]

 

                                                    SARS-COV-2 COVID-19 

PFIZER VACCINE              Unknown      Completed                 [facility]

 

                                                    Influenza Virus 

Vaccine (6-35 mo)              Unknown      Completed                 [facility]

 

                                                    Influenza Virus 

Vaccine Quad IM, 

Preserv and ABX 

Free 6 MO-64 YRS 

(FLUCELVAX)               Unknown      Completed                 [facility]

 

                                                    SARS-COV-2 COVID-19 

CLAUDIA-SUCROSE 

VACCINE 12 YRS+, 

BIVALENT 0.3ML, IM, 

(PFIZER GRAY TOP)              Unknown      Completed                 [facility]

 

                    TDAP                Unknown   Completed           [facility]

 

                                                    SARS-COV-2 COVID 19 

CLAUDIA SUCROSE 

VACCINE 12+, 

8884-6804, 0.3 ML 

(30 MCG), IM PFIZER 

(GRAY TOP)                Unknown      Completed                 [facility]

 

                                                    Pneumococcal 

Polysaccharide, 

PPSV23 (PNEUMOVAX)              Unknown      Completed                 [facility]

 

                                                    Influenza Virus 

Vaccine Quad .5 mL 

IM 6+ MO 

(FLUZONE/FLULAVAL/F

LUARIX)                   Unknown      Completed                 [facility]

 

                                                    SARS-COV-2 COVID-19 

PFIZER VACCINE              Unknown      Completed                 [facility]

 

                                                    Influenza Virus 

Vaccine (6-35 mo)              Unknown      Completed                 [facility]

 

                                                    Influenza Virus 

Vaccine Quad IM, 

Preserv and ABX 

Free 6 MO-64 YRS 

(FLUCELVAX)               Unknown      Completed                 [facility]

 

                                                    SARS-COV-2 COVID-19 

CLAUDIA-SUCROSE 

VACCINE 12 YRS+, 

BIVALENT 0.3ML, IM, 

(PFIZER GRAY TOP)              Unknown      Completed                 [facility]

 

                    TDAP                Unknown   Completed           [facility]

 

                                                    SARS-COV-2 COVID 19 

CLAUDIA SUCROSE 

VACCINE 12, 

3183-7429, 0.3 ML 

(30 MCG), IM PFIZER 

(GRAY TOP)                Unknown      Completed                 [facility]

 

                                                    Pneumococcal 

Polysaccharide, 

PPSV23 (PNEUMOVAX)              Unknown      Completed                 [facility]

 

                                                    Influenza Virus 

Vaccine Quad .5 mL 

IM 6+ MO 

(FLUZONE/FLULAVAL/F

LUARIX)                   Unknown      Completed                 [facility]

 

                                                    SARS-COV-2 COVID-19 

PFIZER VACCINE              Unknown      Completed                 [facility]

 

                                                    Influenza Virus 

Vaccine (6-35 mo)              Unknown      Completed                 [facility]

 

                                                    Influenza Virus 

Vaccine Quad IM, 

Preserv and ABX 

Free 6 MO-64 YRS 

(FLUCELVAX)               Unknown      Completed                 [facility]

 

                                                    SARS-COV-2 COVID-19 

CLAUDIA-SUCROSE 

VACCINE 12 YRS+, 

BIVALENT 0.3ML, IM, 

(PFIZER GRAY TOP)              Unknown      Completed                 [facility]

 

                    TDAP                Unknown   Completed           [facility]

 

                                                    SARS-COV-2 COVID 19 

CLAUDIA SUCROSE 

VACCINE 12, 

5614-2415, 0.3 ML 

(30 MCG), IM PFIZER 

(GRAY TOP)                Unknown      Completed                 [facility]

 

                                                    Pneumococcal 

Polysaccharide, 

PPSV23 (PNEUMOVAX)              Unknown      Completed                 [facility]

 

                                                    Influenza Virus 

Vaccine Quad .5 mL 

IM 6+ MO 

(FLUZONE/FLULAVAL/F

LUARIX)                   Unknown      Completed                 [facility]

 

                                                    SARS-COV-2 COVID-19 

PFIZER VACCINE              Unknown      Completed                 [facility]

 

                                                    Influenza Virus 

Vaccine (6-35 mo)              Unknown      Completed                 [facility]

 

                                                    Influenza Virus 

Vaccine Quad IM, 

Preserv and ABX 

Free 6 MO-64 YRS 

(FLUCELVAX)               Unknown      Completed                 [facility]

 

                                                    SARS-COV-2 COVID-19 

CLUADIA-SUCROSE 

VACCINE 12 YRS+, 

BIVALENT 0.3ML, IM, 

(PFIZER GRAY TOP)              Unknown      Completed                 [facility]

 

                    TDAP                Unknown   Completed           [facility]

 

                                                    SARS-COV-2 COVID 19 

CLAUDIA SUCROSE 

VACCINE 12+, 

9970-7241, 0.3 ML 

(30 MCG), IM PFIZER 

(GRAY TOP)                Unknown      Completed                 [facility]

 

                                                    Pneumococcal 

Polysaccharide, 

PPSV23 (PNEUMOVAX)              Unknown      Completed                 [facility]

 

                                                    Influenza Virus 

Vaccine Quad .5 mL 

IM 6+ MO 

(FLUZONE/FLULAVAL/F

LUARIX)                   Unknown      Completed                 [facility]

 

                                                    SARS-COV-2 COVID-19 

PFIZER VACCINE              Unknown      Completed                 [facility]

 

                                                    Influenza Virus 

Vaccine (6-35 mo)              Unknown      Completed                 [facility]

 

                                                    Influenza Virus 

Vaccine Quad IM, 

Preserv and ABX 

Free 6 MO-64 YRS 

(FLUCELVAX)               Unknown      Completed                 [facility]

 

                                                    SARS-COV-2 COVID-19 

CLAUDIA-SUCROSE 

VACCINE 12 YRS+, 

BIVALENT 0.3ML, IM, 

(PFIZER GRAY TOP)              Unknown      Completed                 [facility]

 

                    TDAP                Unknown   Completed           [facility]

 

                                                    SARS-COV-2 COVID 19 

CLAUDIA SUCROSE 

VACCINE 12+, 

0614-4991, 0.3 ML 

(30 MCG), IM PFIZER 

(GRAY TOP)                Unknown      Completed                 [facility]

 

                                                    Pneumococcal 

Polysaccharide, 

PPSV23 (PNEUMOVAX)              Unknown      Completed                 [facility]

 

                                                    Influenza Virus 

Vaccine Quad .5 mL 

IM 6+ MO 

(FLUZONE/FLULAVAL/F

LUARIX)                   Unknown      Completed                 [facility]

 

                                                    SARS-COV-2 COVID-19 

PFIZER VACCINE              Unknown      Completed                 [facility]

 

                                                    Influenza Virus 

Vaccine (6-35 mo)              Unknown      Completed                 [facility]

 

                                                    Influenza Virus 

Vaccine Quad IM, 

Preserv and ABX 

Free 6 MO-64 YRS 

(FLUCELVAX)               Unknown      Completed                 [facility]

 

                                                    SARS-COV-2 COVID-19 

CLAUDIA-SUCROSE 

VACCINE 12 YRS+, 

BIVALENT 0.3ML, IM, 

(PFIZER GRAY TOP)              Unknown      Completed                 [facility]

 

                    TDAP                Unknown   Completed           [facility]

 

                                                    SARS-COV-2 COVID 19 

CLAUDIA SUCROSE 

VACCINE 12+, 

4477-5918, 0.3 ML 

(30 MCG), IM PFIZER 

(GRAY TOP)                Unknown      Completed                 [facility]

 

                                                    Pneumococcal 

Polysaccharide, 

PPSV23 (PNEUMOVAX)              Unknown      Completed                 [facility]

 

                                                    Influenza Virus 

Vaccine Quad .5 mL 

IM 6+ MO 

(FLUZONE/FLULAVAL/F

LUARIX)                   Unknown      Completed                 [facility]

 

                                                    SARS-COV-2 COVID-19 

PFIZER VACCINE              Unknown      Completed                 [facility]

 

                                                    Influenza Virus 

Vaccine (6-35 mo)              Unknown      Completed                 [facility]

 

                                                    Influenza Virus 

Vaccine Quad IM, 

Preserv and ABX 

Free 6 MO-64 YRS 

(FLUCELVAX)               Unknown      Completed                 [facility]

 

                                                    SARS-COV-2 COVID-19 

CLAUDIA-SUCROSE 

VACCINE 12 YRS+, 

BIVALENT 0.3ML, IM, 

(PFIZER GRAY TOP)              Unknown      Completed                 [facility]

 

                    TDAP                Unknown   Completed           [facility]

 

                                                    SARS-COV-2 COVID 19 

CLAUDIA SUCROSE 

VACCINE 12+, 

9628-6753, 0.3 ML 

(30 MCG), IM PFIZER 

(GRAY TOP)                Unknown      Completed                 [facility]

 

                                                    Pneumococcal 

Polysaccharide, 

PPSV23 (PNEUMOVAX)              Unknown      Completed                 [facility]

 

                                                    Influenza Virus 

Vaccine Quad .5 mL 

IM 6+ MO 

(FLUZONE/FLULAVAL/F

LUARIX)                   Unknown      Completed                 [facility]

 

                                                    SARS-COV-2 COVID-19 

PFIZER VACCINE              Unknown      Completed                 [facility]

 

                                                    Influenza Virus 

Vaccine (6-35 mo)              Unknown      Completed                 [facility]

 

                                                    Influenza Virus 

Vaccine Quad IM, 

Preserv and ABX 

Free 6 MO-64 YRS 

(FLUCELVAX)               Unknown      Completed                 [facility]

 

                                                    SARS-COV-2 COVID-19 

CLAUDIA-SUCROSE 

VACCINE 12 YRS+, 

BIVALENT 0.3ML, IM, 

(PFIZER GRAY TOP)              Unknown      Completed                 [facility]

 

                    TDAP                Unknown   Completed           [facility]

 

                                                    SARS-COV-2 COVID 19 

CLAUDIA SUCROSE 

VACCINE 12+, 

2168-0931, 0.3 ML 

(30 MCG), IM PFIZER 

(GRAY TOP)                Unknown      Completed                 [facility]

 

                                                    Pneumococcal 

Polysaccharide, 

PPSV23 (PNEUMOVAX)              Unknown      Completed                 [facility]

 

                                                    Influenza Virus 

Vaccine Quad .5 mL 

IM 6+ MO 

(FLUZONE/FLULAVAL/F

LUARIX)                   Unknown      Completed                 [facility]

 

                                                    SARS-COV-2 COVID-19 

PFIZER VACCINE              Unknown      Completed                 [facility]

 

                                                    Influenza Virus 

Vaccine (6-35 mo)              Unknown      Completed                 [facility]

 

                                                    Influenza Virus 

Vaccine Quad IM, 

Preserv and ABX 

Free 6 MO-64 YRS 

(FLUCELVAX)               Unknown      Completed                 [facility]

 

                                                    SARS-COV-2 COVID-19 

CLAUDIA-SUCROSE 

VACCINE 12 YRS+, 

BIVALENT 0.3ML, IM, 

(PFIZER GRAY TOP)              Unknown      Completed                 [facility]

 

                    TDAP                Unknown   Completed           [facility]

 

                                                    SARS-COV-2 COVID 19 

CLAUDIA SUCROSE 

VACCINE 12+, 

5346-9958, 0.3 ML 

(30 MCG), IM PFIZER 

(GRAY TOP)                Unknown      Completed                 [facility]

 

                                                    Pneumococcal 

Polysaccharide, 

PPSV23 (PNEUMOVAX)              Unknown      Completed                 [facility]

 

                                                    Influenza Virus 

Vaccine Quad .5 mL 

IM 6+ MO 

(FLUZONE/FLULAVAL/F

LUARIX)                   Unknown      Completed                 [facility]

 

                                                    SARS-COV-2 COVID-19 

PFIZER VACCINE              Unknown      Completed                 [facility]

 

                                                    Influenza Virus 

Vaccine (6-35 mo)              Unknown      Completed                 [facility]

 

                                                    Influenza Virus 

Vaccine Quad IM, 

Preserv and ABX 

Free 6 MO-64 YRS 

(FLUCELVAX)               Unknown      Completed                 [facility]

 

                                                    SARS-COV-2 COVID-19 

CLAUDIA-SUCROSE 

VACCINE 12 YRS+, 

BIVALENT 0.3ML, IM, 

(PFIZER GRAY TOP)              Unknown      Completed                 [facility]

 

                    TDAP                Unknown   Completed           [facility]

 

                                                    SARS-COV-2 COVID 19 

CLAUDIA SUCROSE 

VACCINE 12+, 

4241-5581, 0.3 ML 

(30 MCG), IM PFIZER 

(GRAY TOP)                Unknown      Completed                 [facility]

 

                                                    Pneumococcal 

Polysaccharide, 

PPSV23 (PNEUMOVAX)              Unknown      Completed                 [facility]

 

                                                    Influenza Virus 

Vaccine Quad .5 mL 

IM 6+ MO 

(FLUZONE/FLULAVAL/F

LUARIX)                   Unknown      Completed                 [facility]

 

                                                    SARS-COV-2 COVID-19 

PFIZER VACCINE              Unknown      Completed                 [facility]

 

                                                    Influenza Virus 

Vaccine (6-35 mo)              Unknown      Completed                 [facility]

 

                                                    Influenza Virus 

Vaccine Quad IM, 

Preserv and ABX 

Free 6 MO-64 YRS 

(FLUCELVAX)               Unknown      Completed                 [facility]

 

                                                    SARS-COV-2 COVID-19 

CLAUDIA-SUCROSE 

VACCINE 12 YRS+, 

BIVALENT 0.3ML, IM, 

(PFIZER GRAY TOP)              Unknown      Completed                 [facility]

 

                    TDAP                Unknown   Completed           [facility]

 

                                                    SARS-COV-2 COVID 19 

CLAUDIA SUCROSE 

VACCINE 12+, 

4240-2047, 0.3 ML 

(30 MCG), IM PFIZER 

(GRAY TOP)                Unknown      Completed                 [facility]

 

                                                    Pneumococcal 

Polysaccharide, 

PPSV23 (PNEUMOVAX)              Unknown      Completed                 [facility]

 

                                                    Influenza Virus 

Vaccine Quad .5 mL 

IM 6+ MO 

(FLUZONE/FLULAVAL/F

LUARIX)                   Unknown      Completed                 [facility]

 

                                                    SARS-COV-2 COVID-19 

PFIZER VACCINE              Unknown      Completed                 [facility]

 

                                                    Influenza Virus 

Vaccine (6-35 mo)              Unknown      Completed                 [facility]

 

                                                    Influenza Virus 

Vaccine Quad IM, 

Preserv and ABX 

Free 6 MO-64 YRS 

(FLUCELVAX)               Unknown      Completed                 [facility]

 

                                                    SARS-COV-2 COVID-19 

CLAUDIA-SUCROSE 

VACCINE 12 YRS+, 

BIVALENT 0.3ML, IM, 

(PFIZER GRAY TOP)              Unknown      Completed                 [facility]

 

                    TDAP                Unknown   Completed           [facility]

 

                                                    SARS-COV-2 COVID 19 

CLAUDIA SUCROSE 

VACCINE 12+, 

4507-3328, 0.3 ML 

(30 MCG), IM PFIZER 

(GRAY TOP)                Unknown      Completed                 [facility]

 

                                                    Pneumococcal 

Polysaccharide, 

PPSV23 (PNEUMOVAX)              Unknown      Completed                 [facility]

 

                                                    Influenza Virus 

Vaccine Quad .5 mL 

IM 6+ MO 

(FLUZONE/FLULAVAL/F

LUARIX)                   Unknown      Completed                 [facility]

 

                                                    SARS-COV-2 COVID-19 

PFIZER VACCINE              Unknown      Completed                 [facility]

 

                                                    Influenza Virus 

Vaccine (6-35 mo)              Unknown      Completed                 [facility]

 

                                                    Influenza Virus 

Vaccine Quad IM, 

Preserv and ABX 

Free 6 MO-64 YRS 

(FLUCELVAX)               Unknown      Completed                 [facility]

 

                                                    SARS-COV-2 COVID-19 

CLAUDIA-SUCROSE 

VACCINE 12 YRS+, 

BIVALENT 0.3ML, IM, 

(PFIZER GRAY TOP)              Unknown      Completed                 [facility]

 

                    TDAP                Unknown   Completed           [facility]

 

                                                    SARS-COV-2 COVID 19 

CLAUDIA SUCROSE 

VACCINE 12+, 

3172-6814, 0.3 ML 

(30 MCG), IM PFIZER 

(GRAY TOP)                Unknown      Completed                 [facility]

 

                                                    Pneumococcal 

Polysaccharide, 

PPSV23 (PNEUMOVAX)              Unknown      Completed                 [facility]

 

                                                    Influenza Virus 

Vaccine Quad .5 mL 

IM 6+ MO 

(FLUZONE/FLULAVAL/F

LUARIX)                   Unknown      Completed                 [facility]

 

                                                    SARS-COV-2 COVID-19 

PFIZER VACCINE              Unknown      Completed                 [facility]

 

                                                    Influenza Virus 

Vaccine (6-35 mo)              Unknown      Completed                 [facility]

 

                                                    Influenza Virus 

Vaccine Quad IM, 

Preserv and ABX 

Free 6 MO-64 YRS 

(FLUCELVAX)               Unknown      Completed                 [facility]

 

                                                    SARS-COV-2 COVID-19 

CLAUDIA-SUCROSE 

VACCINE 12 YRS+, 

BIVALENT 0.3ML, IM, 

(PFIZER GRAY TOP)              Unknown      Completed                 [facility]

 

                    TDAP                Unknown   Completed           [facility]

 

                                                    SARS-COV-2 COVID 19 

CLAUDIA SUCROSE 

VACCINE 12+, 

6186-7910, 0.3 ML 

(30 MCG), IM PFIZER 

(GRAY TOP)                Unknown      Completed                 [facility]

 

                                                    Pneumococcal 

Polysaccharide, 

PPSV23 (PNEUMOVAX)              Unknown      Completed                 [facility]

 

                                                    Influenza Virus 

Vaccine Quad .5 mL 

IM 6+ MO 

(FLUZONE/FLULAVAL/F

LUARIX)                   Unknown      Completed                 [facility]

 

                                                    SARS-COV-2 COVID-19 

PFIZER VACCINE              Unknown      Completed                 [facility]

 

                                                    Influenza Virus 

Vaccine (6-35 mo)              Unknown      Completed                 [facility]

 

                                                    Influenza Virus 

Vaccine Quad IM, 

Preserv and ABX 

Free 6 MO-64 YRS 

(FLUCELVAX)               Unknown      Completed                 [facility]

 

                                                    SARS-COV-2 COVID-19 

CLAUDIA-SUCROSE 

VACCINE 12 YRS+, 

BIVALENT 0.3ML, IM, 

(PFIZER GRAY TOP)              Unknown      Completed                 [facility]

 

                    TDAP                Unknown   Completed           [facility]

 

                                                    SARS-COV-2 COVID 19 

CLAUDIA SUCROSE 

VACCINE 12+, 

0396-0108, 0.3 ML 

(30 MCG), IM PFIZER 

(GRAY TOP)                Unknown      Completed                 [facility]

 

                                                    Pneumococcal 

Polysaccharide, 

PPSV23 (PNEUMOVAX)              Unknown      Completed                 [facility]

 

                                                    Influenza Virus 

Vaccine Quad .5 mL 

IM 6+ MO 

(FLUZONE/FLULAVAL/F

LUARIX)                   Unknown      Completed                 [facility]

 

                                                    SARS-COV-2 COVID-19 

PFIZER VACCINE              Unknown      Completed                 [facility]

 

                                                    Influenza Virus 

Vaccine (6-35 mo)              Unknown      Completed                 [facility]

 

                                                    Influenza Virus 

Vaccine Quad IM, 

Preserv and ABX 

Free 6 MO-64 YRS 

(FLUCELVAX)               Unknown      Completed                 [facility]

 

                                                    SARS-COV-2 COVID-19 

CLAUDIA-SUCROSE 

VACCINE 12 YRS+, 

BIVALENT 0.3ML, IM, 

(PFIZER GRAY TOP)              Unknown      Completed                 [facility]

 

                    TDAP                Unknown   Completed           [facility]

 

                                                    SARS-COV-2 COVID 19 

CLAUDIA SUCROSE 

VACCINE 12+, 

4346-1239, 0.3 ML 

(30 MCG), IM PFIZER 

(GRAY TOP)                Unknown      Completed                 [facility]

 

                                                    Pneumococcal 

Polysaccharide, 

PPSV23 (PNEUMOVAX)              Unknown      Completed                 [facility]

 

                                                    Influenza Virus 

Vaccine Quad .5 mL 

IM 6+ MO 

(FLUZONE/FLULAVAL/F

LUARIX)                   Unknown      Completed                 [facility]

 

                                                    SARS-COV-2 COVID-19 

PFIZER VACCINE              Unknown      Completed                 [facility]

 

                                                    Influenza Virus 

Vaccine (6-35 mo)              Unknown      Completed                 [facility]

 

                                                    Influenza Virus 

Vaccine Quad IM, 

Preserv and ABX 

Free 6 MO-64 YRS 

(FLUCELVAX)               Unknown      Completed                 [facility]

 

                                                    SARS-COV-2 COVID-19 

CLAUDIA-SUCROSE 

VACCINE 12 YRS+, 

BIVALENT 0.3ML, IM, 

(PFIZER GRAY TOP)              Unknown      Completed                 [facility]

 

                    TDAP                Unknown   Completed           [facility]

 

                                                    SARS-COV-2 COVID 19 

CLAUDIA SUCROSE 

VACCINE 12+, 

3054-1276, 0.3 ML 

(30 MCG), IM PFIZER 

(GRAY TOP)                Unknown      Completed                 [facility]

 

                                                    Pneumococcal 

Polysaccharide, 

PPSV23 (PNEUMOVAX)              Unknown      Completed                 [facility]

 

                                                    Influenza Virus 

Vaccine Quad .5 mL 

IM 6+ MO 

(FLUZONE/FLULAVAL/F

LUARIX)                   Unknown      Completed                 [facility]

 

                                                    SARS-COV-2 COVID-19 

PFIZER VACCINE              Unknown      Completed                 [facility]

 

                                                    Influenza Virus 

Vaccine (6-35 mo)              Unknown      Completed                 [facility]

 

                                                    Influenza Virus 

Vaccine Quad IM, 

Preserv and ABX 

Free 6 MO-64 YRS 

(FLUCELVAX)               Unknown      Completed                 [facility]

 

                                                    SARS-COV-2 COVID-19 

CLAUDIA-SUCROSE 

VACCINE 12 YRS+, 

BIVALENT 0.3ML, IM, 

(PFIZER GRAY TOP)              Unknown      Completed                 [facility]

 

                    TDAP                Unknown   Completed           [facility]

 

                                                    SARS-COV-2 COVID 19 

CLAUDIA SUCROSE 

VACCINE 12+, 

8149-5724, 0.3 ML 

(30 MCG), IM PFIZER 

(GRAY TOP)                Unknown      Completed                 [facility]

 

                                                    Pneumococcal 

Polysaccharide, 

PPSV23 (PNEUMOVAX)              Unknown      Completed                 [facility]

 

                                                    Influenza Virus 

Vaccine Quad .5 mL 

IM 6+ MO 

(FLUZONE/FLULAVAL/F

LUARIX)                   Unknown      Completed                 [facility]

 

                                                    SARS-COV-2 COVID-19 

PFIZER VACCINE              Unknown      Completed                 [facility]

 

                                                    Influenza Virus 

Vaccine (6-35 mo)              Unknown      Completed                 [facility]

 

                                                    Influenza Virus 

Vaccine Quad IM, 

Preserv and ABX 

Free 6 MO-64 YRS 

(FLUCELVAX)               Unknown      Completed                 [facility]

 

                                                    SARS-COV-2 COVID-19 

CLAUDIA-SUCROSE 

VACCINE 12 YRS+, 

BIVALENT 0.3ML, IM, 

(PFIZER GRAY TOP)              Unknown      Completed                 [facility]

 

                    TDAP                Unknown   Completed           [facility]

 

                                                    SARS-COV-2 COVID 19 

CLAUDIA SUCROSE 

VACCINE 12+, 

3643-8858, 0.3 ML 

(30 MCG), IM PFIZER 

(GRAY TOP)                Unknown      Completed                 [facility]

 

                                                    Pneumococcal 

Polysaccharide, 

PPSV23 (PNEUMOVAX)              Unknown      Completed                 [facility]

 

                                                    Influenza Virus 

Vaccine Quad .5 mL 

IM 6+ MO 

(FLUZONE/FLULAVAL/F

LUARIX)                   Unknown      Completed                 [facility]

 

                                                    SARS-COV-2 COVID-19 

PFIZER VACCINE              Unknown      Completed                 [facility]

 

                                                    Influenza Virus 

Vaccine (6-35 mo)              Unknown      Completed                 [facility]

 

                                                    Influenza Virus 

Vaccine Quad IM, 

Preserv and ABX 

Free 6 MO-64 YRS 

(FLUCELVAX)               Unknown      Completed                 [facility]

 

                                                    SARS-COV-2 COVID-19 

CLAUDIA-SUCROSE 

VACCINE 12 YRS+, 

BIVALENT 0.3ML, IM, 

(PFIZER GRAY TOP)              Unknown      Completed                 [facility]

 

                    TDAP                Unknown   Completed           [facility]

 

                                                    SARS-COV-2 COVID 19 

CLAUDIA SUCROSE 

VACCINE 12+, 

4632-7835, 0.3 ML 

(30 MCG), IM PFIZER 

(GRAY TOP)                Unknown      Completed                 [facility]

 

                                                    Pneumococcal 

Polysaccharide, 

PPSV23 (PNEUMOVAX)              Unknown      Completed                 [facility]

 

                                                    Influenza Virus 

Vaccine Quad .5 mL 

IM 6+ MO 

(FLUZONE/FLULAVAL/F

LUARIX)                   Unknown      Completed                 [facility]

 

                                                    SARS-COV-2 COVID-19 

PFIZER VACCINE              Unknown      Completed                 [facility]

 

                                                    Influenza Virus 

Vaccine (6-35 mo)              Unknown      Completed                 [facility]

 

                                                    Influenza Virus 

Vaccine Quad IM, 

Preserv and ABX 

Free 6 MO-64 YRS 

(FLUCELVAX)               Unknown      Completed                 [facility]

 

                                                    SARS-COV-2 COVID-19 

CLAUDIA-SUCROSE 

VACCINE 12 YRS+, 

BIVALENT 0.3ML, IM, 

(PFIZER GRAY TOP)              Unknown      Completed                 [facility]

 

                    TDAP                Unknown   Completed           [facility]

 

                                                    SARS-COV-2 COVID 19 

CLAUDIA SUCROSE 

VACCINE 12+, 

4309-9433, 0.3 ML 

(30 MCG), IM PFIZER 

(GRAY TOP)                Unknown      Completed                 [facility]

 

                                                    Pneumococcal 

Polysaccharide, 

PPSV23 (PNEUMOVAX)              Unknown      Completed                 [facility]

 

                                                    Influenza Virus 

Vaccine Quad .5 mL 

IM 6+ MO 

(FLUZONE/FLULAVAL/F

LUARIX)                   Unknown      Completed                 [facility]

 

                                                    SARS-COV-2 COVID-19 

PFIZER VACCINE              Unknown      Completed                 [facility]

 

                                                    Influenza Virus 

Vaccine (6-35 mo)              Unknown      Completed                 [facility]

 

                                                    Influenza Virus 

Vaccine Quad IM, 

Preserv and ABX 

Free 6 MO-64 YRS 

(FLUCELVAX)               Unknown      Completed                 [facility]

 

                                                    SARS-COV-2 COVID-19 

CLAUDIA-SUCROSE 

VACCINE 12 YRS+, 

BIVALENT 0.3ML, IM, 

(PFIZER GRAY TOP)              Unknown      Completed                 [facility]

 

                    TDAP                Unknown   Completed           [facility]

 

                                                    SARS-COV-2 COVID 19 

CLAUDIA SUCROSE 

VACCINE 12+, 

7883-4970, 0.3 ML 

(30 MCG), IM PFIZER 

(GRAY TOP)                Unknown      Completed                 [facility]

 

                                                    Pneumococcal 

Polysaccharide, 

PPSV23 (PNEUMOVAX)              Unknown      Completed                 [facility]

 

                                                    Influenza Virus 

Vaccine Quad .5 mL 

IM 6+ MO 

(FLUZONE/FLULAVAL/F

LUARIX)                   Unknown      Completed                 [facility]

 

                                                    SARS-COV-2 COVID-19 

PFIZER VACCINE              Unknown      Completed                 [facility]

 

                                                    Influenza Virus 

Vaccine (6-35 mo)              Unknown      Completed                 [facility]

 

                                                    Influenza Virus 

Vaccine Quad IM, 

Preserv and ABX 

Free 6 MO-64 YRS 

(FLUCELVAX)               Unknown      Completed                 [facility]

 

                                                    SARS-COV-2 COVID-19 

CLAUDIA-SUCROSE 

VACCINE 12 YRS+, 

BIVALENT 0.3ML, IM, 

(PFIZER GRAY TOP)              Unknown      Completed                 [facility]

 

                    TDAP                Unknown   Completed           [facility]

 

                                                    SARS-COV-2 COVID 19 

CLAUDIA SUCROSE 

VACCINE 12+, 

[id number], 0.3 ML 

(30 MCG), IM PFIZER 

(GRAY TOP)                Unknown      Completed                 [facility]

 

                                                    Pneumococcal 

Polysaccharide, 

PPSV23 (PNEUMOVAX)              Unknown      Completed                 [facility]

 

                                                    Influenza Virus 

Vaccine Quad .5 mL 

IM 6+ MO 

(FLUZONE/FLULAVAL/F

LUARIX)                   Unknown      Completed                 [facility]

 

                                                    SARS-COV-2 COVID-19 

PFIZER VACCINE              Unknown      Completed                 [facility]

 

                                                    Influenza Virus 

Vaccine (6-35 mo)              Unknown      Completed                 [facility]

 

                                                    Influenza Virus 

Vaccine Quad IM, 

Preserv and ABX 

Free 6 MO-64 YRS 

(FLUCELVAX)               Unknown      Completed                 [facility]

 

                                                    SARS-COV-2 COVID-19 

CLAUDIA-SUCROSE 

VACCINE 12 YRS+, 

BIVALENT 0.3ML, IM, 

(PFIZER GRAY TOP)              Unknown      Completed                 [facility]

 

                    TDAP                Unknown   Completed           [facility]

 

                                                    SARS-COV-2 COVID 19 

CLAUDIA SUCROSE 

VACCINE 12+, 

[id number], 0.3 ML 

(30 MCG), IM PFIZER 

(GRAY TOP)                Unknown      Completed                 [facility]

 

                                                    Pneumococcal 

Polysaccharide, 

PPSV23 (PNEUMOVAX)              Unknown      Completed                 [facility]

 

                                                    Influenza Virus 

Vaccine Quad .5 mL 

IM 6+ MO 

(FLUZONE/FLULAVAL/F

LUARIX)                   Unknown      Completed                 [facility]

 

                                                    SARS-COV-2 COVID-19 

PFIZER VACCINE              Unknown      Completed                 [facility]

 

                                                    Influenza Virus 

Vaccine (6-35 mo)              Unknown      Completed                 [facility]

 

                                                    Influenza Virus 

Vaccine Quad IM, 

Preserv and ABX 

Free 6 MO-64 YRS 

(FLUCELVAX)               Unknown      Completed                 [facility]

 

                                                    SARS-COV-2 COVID-19 

CLAUDIA-SUCROSE 

VACCINE 12 YRS+, 

BIVALENT 0.3ML, IM, 

(PFIZER GRAY TOP)              Unknown      Completed                 [facility]

 

                    TDAP                Unknown   Completed           [facility]

 

                                                    SARS-COV-2 COVID 19 

CLAUDIA SUCROSE 

VACCINE 12+, 

[id number], 0.3 ML 

(30 MCG), IM PFIZER 

(GRAY TOP)                Unknown      Completed                 [facility]







Vital Signs





                      Vital Name Observation Time Observation Value Comments   S

ource

 

                                                    Systolic blood 

pressure        2024 18:46:00 135 mm[Hg]                      [facility]

 

                                                    Diastolic blood 

pressure        2024 18:46:00 84 mm[Hg]                       [facility]

 

                      Heart rate 2024 18:46:00 106 /min              Unive

[facility]

 

                      Body temperature 2024 18:46:00 36 Elen               

 [facility]

 

                      Respiratory rate 2024 18:46:00 18 /min              

 [facility]

 

                      Body height 2024 18:46:00 175.3 cm              [facility]

 

                      Body weight 2024 18:46:00 83.915 kg             [facility]

 

                      BMI        2024 18:46:00 27.32 kg/m2            [facility]

 

                                                    Oxygen saturation in 

Arterial blood by 

Pulse oximetry  2024 18:46:00 96 /min         Room Air        [facility]

 

                                                    Systolic blood 

pressure        2024 13:51:00 124 mm[Hg]                      [facility]

 

                                                    Diastolic blood 

pressure        2024 13:51:00 75 mm[Hg]                       [facility]

 

                      Heart rate 2024 13:51:00 94 /min               Unive

[facility]

 

                      Body temperature 2024 13:51:00 36.89 Elen            

 [facility]

 

                      Body height 2024 13:51:00 175.3 cm              [facility]

 

                      Body weight 2024 13:51:00 83.87 kg              [facility]

 

                      BMI        2024 13:51:00 27.30 kg/m2            [facility]

 

                                                    Oxygen saturation in 

Arterial blood by 

Pulse oximetry  2024 13:51:00 100 /min                        [facility]

 

                                                    Systolic blood 

pressure        2023 19:08:00 129 mm[Hg]                      [facility]

 

                                                    Diastolic blood 

pressure        2023 19:08:00 76 mm[Hg]                       [facility]

 

                      Heart rate 2023 19:08:00 105 /min              Unive

[facility]

 

                      Body temperature 2023 19:08:00 36.5 Elen             

 [facility]

 

                      Body height 2023 19:08:00 175.3 cm              Univ

[facility]

 

                      Body weight 2023 19:08:00 84.868 kg             Univ

[facility]

 

                      BMI        2023 19:08:00 27.63 kg/m2            [facility]

 

                                                    Oxygen saturation in 

Arterial blood by 

Pulse oximetry  2023 19:08:00 98 /min                         [facility]

 

                                                    Systolic blood 

pressure        2023 18:09:00 125 mm[Hg]                      [facility]

 

                                                    Diastolic blood 

pressure        2023 18:09:00 74 mm[Hg]                       [facility]

 

                      Heart rate 2023 18:09:00 92 /min               Unive

[facility]

 

                      Body temperature 2023 18:09:00 37.17 Elen            

 [facility]

 

                      Respiratory rate 2023 18:09:00 18 /min              

 [facility]

 

                      Body height 2023 18:09:00 175.3 cm              [facility]

 

                      Body weight 2023 18:09:00 82.555 kg             [facility]

 

                      BMI        2023 18:09:00 26.88 kg/m2            [facility]

 

                                                    Oxygen saturation in 

Arterial blood by 

Pulse oximetry  2023 18:09:00 98 /min                         [facility]

 

                                                    Systolic blood 

pressure        2023 21:26:00 146 mm[Hg]                      [facility]

 

                                                    Diastolic blood 

pressure        2023 21:26:00 84 mm[Hg]                       [facility]

 

                      Heart rate 2023 21:26:00 100 /min              Unive

[facility]

 

                      Body temperature 2023 21:26:00 37.06 Elen            

 [facility]

 

                      Respiratory rate 2023 21:26:00 16 /min              

 [facility]

 

                      Body height 2023 21:26:00 175.3 cm              Univ

[facility]

 

                      Body weight 2023 21:26:00 81.012 kg             Univ

[facility]

 

                      BMI        2023 21:26:00 26.37 kg/m2            Univ

[facility]

 

                                                    Oxygen saturation in 

Arterial blood by 

Pulse oximetry  2023 21:26:00 97 /min                         [facility]

 

                                                    Systolic blood 

pressure        2023 03:48:31 127 mm[Hg]                      [facility]

 

                                                    Diastolic blood 

pressure        2023 03:48:31 83 mm[Hg]                       [facility]

 

                      Heart rate 2023 03:48:31 75 /min               Unive

[facility]

 

                      Respiratory rate 2023 03:48:31 18 /min              

 [facility]

 

                                                    Oxygen saturation in 

Arterial blood by 

Pulse oximetry  2023 03:48:31 98 /min                         [facility]

 

                      Body temperature 2023 00:53:00 36.83 Elen            

 [facility]

 

                      Body height 2023 00:53:00 175.3 cm              Univ

[facility]

 

                      Body weight 2023 00:53:00 81.375 kg             [facility]

 

                      BMI        2023 00:53:00 26.49 kg/m2            [facility]

 

                                                    Systolic blood 

pressure        2023 20:52:00 101 mm[Hg]                      [facility]

 

                                                    Diastolic blood 

pressure        2023 20:52:00 63 mm[Hg]                       [facility]

 

                      Heart rate 2023 20:52:00 97 /min               Unive

[facility]

 

                      Body temperature 2023 20:52:00 36.5 Elen             

 [facility]

 

                      Body height 2023 20:52:00 175.3 cm              Univ

[facility]

 

                      Body weight 2023 20:52:00 79.833 kg             [facility]

 

                      BMI        2023 20:52:00 25.99 kg/m2            [facility]

 

                                                    Oxygen saturation in 

Arterial blood by 

Pulse oximetry  2023 20:52:00 98 /min                         [facility]

 

                                                    Systolic blood 

pressure        2023 22:44:00 146 mm[Hg]                      [facility]

 

                                                    Diastolic blood 

pressure        2023 22:44:00 81 mm[Hg]                       [facility]

 

                      Heart rate 2023 22:44:00 99 /min               Unive

[facility]

 

                      Body temperature 2023 22:44:00 36.78 Elen            

 [facility]

 

                      Respiratory rate 2023 22:44:00 18 /min              

 [facility]

 

                      Body height 2023 22:44:00 175.3 cm              [facility]

 

                      Body weight 2023 22:44:00 81.194 kg             [facility]

 

                      BMI        2023 22:44:00 26.43 kg/m2            [facility]

 

                                                    Oxygen saturation in 

Arterial blood by 

Pulse oximetry  2023 22:44:00 98 /min                         [facility]

 

                                                    Systolic blood 

pressure        2023 20:36:56 125 mm[Hg]                      [facility]

 

                                                    Diastolic blood 

pressure        2023 20:36:56 78 mm[Hg]                       [facility]

 

                      Heart rate 2023 20:36:56 71 /min               Unive

[facility]

 

                      Respiratory rate 2023 20:36:56 18 /min              

 [facility]

 

                                                    Oxygen saturation in 

Arterial blood by 

Pulse oximetry  2023 20:36:56 99 /min                         [facility]

 

                      Body temperature 2023 18:11:00 36.17 Elen            

 [facility]

 

                      Body height 2023 18:11:00 175.3 cm              [facility]

 

                      Body weight 2023 18:11:00 81.194 kg             [facility]

 

                      BMI        2023 18:11:00 26.43 kg/m2            [facility]

 

                                                    Systolic blood 

pressure        2023 20:46:00 139 mm[Hg]                      [facility]

 

                                                    Diastolic blood 

pressure        2023 20:46:00 90 mm[Hg]                       [facility]

 

                      Heart rate 2023 20:46:00 95 /min               Unive

[facility]

 

                      Body temperature 2023 20:46:00 36.61 Elen            

 [facility]

 

                      Respiratory rate 2023 20:46:00 14 /min              

 [facility]

 

                      Body height 2023 20:46:00 175.3 cm              [facility]

 

                      Body weight 2023 20:46:00 81.194 kg             [facility]

 

                      BMI        2023 20:46:00 26.43 kg/m2            [facility]

 

                                                    Oxygen saturation in 

Arterial blood by 

Pulse oximetry  2023 20:46:00 99 /min                         [facility]

 

                                                    Systolic blood 

pressure        2023 04:30:00 115 mm[Hg]                      [facility]

 

                                                    Diastolic blood 

pressure        2023 04:30:00 62 mm[Hg]                       [facility]

 

                      Heart rate 2023 04:30:00 79 /min               [facility]

[facility]

 

                      Respiratory rate 2023 04:30:00 18 /min              

 [facility]

 

                                                    Oxygen saturation in 

Arterial blood by 

Pulse oximetry  2023 04:30:00 97 /min                         [facility]

 

                      Body temperature 2023 02:34:00 36.61 Elen            

 [facility]

 

                      Body height 2023 02:34:00 170.2 cm              [facility]

 

                      Body weight 2023 02:34:00 81.194 kg             [facility]

 

                      BMI        2023 02:34:00 28.04 kg/m2            [facility]

 

                      Heart rate 2023 04:18:24 92 /min               [facility]

 

                      Respiratory rate 2023 04:18:24 15 /min              

 [facility]

 

                                                    Oxygen saturation in 

Arterial blood by 

Pulse oximetry  2023 04:18:24 97 /min                         [facility]

 

                                                    Systolic blood 

pressure        2023 04:00:00 139 mm[Hg]                      [facility]

 

                                                    Diastolic blood 

pressure        2023 04:00:00 79 mm[Hg]                       [facility]

 

                      Body temperature 2023 01:38:00 37 Elen               

 [facility]

 

                      Body height 2023 01:38:00 170.2 cm              [facility]

 

                      Body weight 2023 01:38:00 81.194 kg             Univ

[facility]

 

                      BMI        2023 01:38:00 28.04 kg/m2            Univ

[facility]

 

                                                    Systolic blood 

pressure        2023 19:58:00 133 mm[Hg]                      [facility]

 

                                                    Diastolic blood 

pressure        2023 19:58:00 80 mm[Hg]                       [facility]

 

                      Heart rate 2023 19:58:00 98 /min               Unive

[facility]

 

                      Body temperature 2023 19:58:00 36.33 Elen            

 [facility]

 

                      Respiratory rate 2023 19:58:00 16 /min              

 [facility]

 

                      Body height 2023 19:58:00 170.2 cm              [facility]

 

                      Body weight 2023 19:58:00 81.194 kg             [facility]

 

                      BMI        2023 19:58:00 28.04 kg/m2            [facility]

 

                                                    Oxygen saturation in 

Arterial blood by 

Pulse oximetry  2023 19:58:00 98 /min                         [facility]

 

                                                    Systolic blood 

pressure        2023 23:15:00 137 mm[Hg]                      [facility]

 

                                                    Diastolic blood 

pressure        2023 23:15:00 92 mm[Hg]                       [facility]

 

                      Heart rate 2023 23:15:00 106 /min              [facility]

 

                      Body temperature 2023 23:15:00 36.61 Elen            

 [facility]

 

                      Respiratory rate 2023 23:15:00 16 /min              

 [facility]

 

                      Body height 2023 23:15:00 170.2 cm              Univ

[facility]

 

                      Body weight 2023 23:15:00 81.194 kg             [facility]

 

                      BMI        2023 23:15:00 28.04 kg/m2            [facility]

 

                                                    Oxygen saturation in 

Arterial blood by 

Pulse oximetry  2023 23:15:00 100 /min                        [facility]

 

                                                    Systolic blood 

pressure        2023 02:27:00 147 mm[Hg]                      [facility]

 

                                                    Diastolic blood 

pressure        2023 02:27:00 91 mm[Hg]                       [facility]

 

                      Heart rate 2023 02:27:00 100 /min              Unive

[facility]

 

                      Body temperature 2023 02:27:00 36.39 Elen            

 [facility]

 

                      Respiratory rate 2023 02:27:00 16 /min              

 [facility]

 

                      Body height 2023 02:27:00 170.2 cm              [facility]

 

                      Body weight 2023 02:27:00 82.555 kg             [facility]

 

                      BMI        2023 02:27:00 28.51 kg/m2            [facility]

 

                                                    Oxygen saturation in 

Arterial blood by 

Pulse oximetry  2023 02:27:00 100 /min                        [facility]

 

                                                    Systolic blood 

pressure        2023 00:35:00 149 mm[Hg]                      [facility]

 

                                                    Diastolic blood 

pressure        2023 00:35:00 96 mm[Hg]                       [facility]

 

                      Heart rate 2023 00:35:00 96 /min               Unive

[facility]

 

                      Body temperature 2023 00:35:00 36.56 Elen            

 [facility]

 

                      Respiratory rate 2023 00:35:00 18 /min              

 [facility]

 

                                                    Oxygen saturation in 

Arterial blood by 

Pulse oximetry  2023 00:35:00 97 /min                         [facility]

 

                      Body weight 2023 14:58:00 82.237 kg             [facility]

 

                      BMI        2023 14:58:00 28.40 kg/m2            [facility]

 

                                                    Systolic blood 

pressure        2023 18:51:00 133 mm[Hg]                      [facility]

 

                                                    Diastolic blood 

pressure        2023 18:51:00 88 mm[Hg]                       [facility]

 

                      Heart rate 2023 18:51:00 126 /min              Unive

[facility]

 

                      Body temperature 2023 18:51:00 35.94 Elen            

 [facility]

 

                      Respiratory rate 2023 18:51:00 20 /min              

 [facility]

 

                      Body weight 2023 18:51:00 83.008 kg             Univ

[facility]

 

                      BMI        2023 18:51:00 28.66 kg/m2            Univ

[facility]

 

                                                    Oxygen saturation in 

Arterial blood by 

Pulse oximetry  2023 18:51:00 97 /min                         [facility]

 

                                                    Systolic blood 

pressure        2022 17:00:00 110 mm[Hg]                      [facility]

 

                                                    Diastolic blood 

pressure        2022 17:00:00 71 mm[Hg]                       [facility]

 

                      Heart rate 2022 17:00:00 116 /min              [facility]

 

                      Respiratory rate 2022 17:00:00 18 /min              

 [facility]

 

                                                    Oxygen saturation in 

Arterial blood by 

Pulse oximetry  2022 17:00:00 97 /min                         [facility] [facility]

 

                      Body temperature 2022 14:04:00 37.89 Elen            

 [facility]

 

                      Body weight 2022 14:04:00 81.647 kg             [facility]

 

                      BMI        2022 14:04:00 28.19 kg/m2            [facility]

 

                                                    Systolic blood 

pressure        2022-10-28 17:25:00 128 mm[Hg]                      [facility]

 

                                                    Diastolic blood 

pressure        2022-10-28 17:25:00 87 mm[Hg]                       [facility]

 

                      Heart rate 2022-10-28 17:25:00 121 /min              [facility]

 

                      Body temperature 2022-10-28 17:25:00 36.56 Elen            

 [facility]

 

                      Respiratory rate 2022-10-28 17:25:00 16 /min              

 [facility]

 

                      Body height 2022-10-28 17:25:00 170.2 cm              [facility]

[facility]

 

                      Body weight 2022-10-28 17:25:00 84.823 kg             [facility]

 

                      BMI        2022-10-28 17:25:00 29.29 kg/m2            [facility]

[facility]

 

                                                    Oxygen saturation in 

Arterial blood by 

Pulse oximetry  2022-10-28 17:25:00 99 /min                         [facility]

 

                                                    Systolic blood 

pressure        2022 19:26:00 137 mm[Hg]                      [facility]

 

                                                    Diastolic blood 

pressure        2022 19:26:00 89 mm[Hg]                       [facility]

 

                      Heart rate 2022 19:26:00 122 /min              [facility]

 

                      Body temperature 2022 19:26:00 37.5 Elen             

 [facility]

 

                      Body height 2022 19:20:00 170.2 cm              [facility]

 

                      Body weight 2022 19:20:00 81.557 kg             [facility]

 

                      BMI        2022 19:20:00 28.16 kg/m2            [facility]

 

                                                    Oxygen saturation in 

Arterial blood by 

Pulse oximetry  2022 19:20:00 98 /min                         [facility]







Procedures





                                        Procedure           Date / Time 

Performed                 Performing Clinician      Source

 

                          POCT HEMOGLOBIN A1C TEST 2024 19:30:00 Henry Perez [facility]

 

                                                    PHYSICIAN CERTIFICATION 

STATEMENT                 2024 06:01:00       Doctor Unassigned, No 

Name                                    [facility]

 

                                                    SARS-COV-2 COVID 19 CLAUDIA 

SUCROSE VACCINE 12+, 

[id number], 0.3 ML (30 

MCG), IM PFIZER (GRAY 

TOP)                2023 18:53:30 Butch Ruiz        [facility]

 

                                                    FLU VACC ([id number]), 6 

MO-64 YRS, .5ML, IM, QUAD 

(FLUCELVAX)         2023 18:52:24 Butch Ruiz        [facility]

 

                          POCT HEMOGLOBIN A1C TEST 2023 00:00:00 Dion Solorzano [facility]

 

                          XR FOOT <3 VW LEFT 2023 22:52:36 Shira Coy

 [facility]

 

                                ASSIGNMENT OF BENEFITS 2023 22:14:05 Docto

r Unassigned, No 

Name                                    [facility]

 

                                                    CONSENT/REFUSAL FOR 

DIAGNOSIS AND TREATMENT   2023 21:19:18       Doctor Unassigned, No 

Name                                    [facility]

 

                          EKG-12 LEAD  2023 03:35:08 Papito, Tracy J [facility]

 

                          XR CHEST 1 VW 2023 01:56:47 [facility]udd [facility]

 

                          LIPASE       2023 01:44:00 [facility]udd [facility]

 

                          MAGNESIUM    2023 01:44:00 Tracy Rudd [facility]

 

                          TROPONIN I   2023 01:44:00 Tracy Rudd [facility]

 

                                                    COMP. METABOLIC PANEL 

([id number])             2023 01:44:00 Tracy Rudd  [facility]

 

                          CBC WITH DIFF 2023 01:44:00 Tracy Rudd U

[facility]

 

                                                    COVID-19 (ID NOW RAPID 

TESTING)            2023 01:44:00 Tracy Rudd  [facility]

 

                                                    CONSENT/REFUSAL FOR 

DIAGNOSIS AND TREATMENT   2023 00:31:17       Doctor Unassigned, No 

Name                                    [facility]

 

                                MEDICATION CORRESPONDENCE 2023 05:01:00 Do

ctor Unassigned, No 

Name                                    [facility]

 

                          POCT HEMOGLOBIN A1C TEST 2023 21:14:00 Dion Solorzano [facility]

 

                          TROPONIN I   2023 23:45:00 Andriy Weaver [facility]

 

                                                    COMP. METABOLIC PANEL 

([id number])             2023 23:45:00 Andriy Weaver    [facility]

 

                          CBC WITH DIFF 2023 23:45:00 Andriy Weaver [facility]

 

                          D-DIMER      2023 23:45:00 Andriy Weaver [facility]

 

                                ASSIGNMENT OF BENEFITS 2023 23:13:25 Docto

r Unassigned, No 

Name                                    [facility]

 

                          XR CHEST 1 VW 2023 23:07:11 Andriy Weaver [facility]

 

                                                    CONSENT/REFUSAL FOR 

DIAGNOSIS AND TREATMENT   2023 22:38:13       Doctor Unassigned, No 

Name                                    [facility]

 

                                ASSIGNMENT OF BENEFITS 2023 20:10:11 Docto

r Unassigned, No 

Name                                    [facility]

 

                          URINALYSIS   2023 18:21:00 Lorene Guerrero [facility]

 

                                                    CONSENT/REFUSAL FOR 

DIAGNOSIS AND TREATMENT   2023 17:58:31       Doctor Unassigned, No 

Name                                    [facility]

 

                                ASSIGNMENT OF BENEFITS 2023 21:18:37 Docto

r Unassigned, No 

Name                                    [facility]

 

                          POCT GLUCOSE (AUTOMATED) 2023 21:07:00 MARK Strickland [facility]

 

                                                    CONSENT/REFUSAL FOR 

DIAGNOSIS AND TREATMENT   2023 20:38:11       Doctor Unassigned, No 

Name                                    [facility]

 

                          CBC WITH DIFF 2023 04:18:00 Jagjit Leo 

[facility]

 

                          LIPASE       2023 03:15:00 Jagjit Leo

[facility]

 

                                                    COMP. METABOLIC PANEL 

([id number])             2023 03:15:00 Jagjit Leo [facility]

 

                                                    CONSENT/REFUSAL FOR 

DIAGNOSIS AND TREATMENT   2023 02:11:48       Doctor Unassigned, No 

Name                                    [facility]

 

                                                    NOTICE OF PRIVACY 

PRACTICES                 2023 02:11:16       Doctor Unassigned, No 

Name                                    [facility]

 

                                MEDICATION CORRESPONDENCE 2023 05:01:00 Do

ctor Unassigned, No 

Name                                    [facility]

 

                          US GALL BLADDER 2023 03:41:51 Markell Kauffman U

[facility]

 

                          LIPASE       2023 02:18:00 Markell Kauffman [facility]

 

                                                    COMP. METABOLIC PANEL 

([id number])             2023 02:18:00 Markell Kauffman    [facility]

 

                          CBC WITH DIFF 2023 02:18:00 Markell Kauffman

[facility]

 

                          URINALYSIS   2023 02:18:00 Markell Kafufman [facility]

 

                                                    CONSENT/REFUSAL FOR 

DIAGNOSIS AND TREATMENT   2023 01:19:39       Doctor Unassigned, No 

Name                                    [facility]

 

                          TDAP VACCINE, >11 YRS, IM 2023 20:36:37 KRISTAL Elder [facility]

 

                                                    DUPLEX VENOUS LEG LEFT - 

BY VASCULAR LAB     2023-04-10 02:06:00 Lubna Pimentel    [facility]

 

                          XR TIBIA FIBULA 2 VW LEFT 2023-04-10 00:14:14 Lubna Pimentel [facility]

 

                                                    CONSENT/REFUSAL FOR 

DIAGNOSIS AND TREATMENT   2023 23:09:27       Doctor Unassigned, No 

Name                                    [facility]

 

                                                    NOTICE OF PRIVACY 

PRACTICES                 2023 02:22:52       Doctor Unassigned, No 

Name                                    [facility]

 

                                                    CONSENT/REFUSAL FOR 

DIAGNOSIS AND TREATMENT   2023 02:22:17       Doctor Unassigned, No 

Name                                    [facility]

 

                          POCT GLUCOSE (AUTOMATED) 2023 06:47:00 Emma Sanchez [facility]

 

                          POCT GLUCOSE (AUTOMATED) 2023 02:44:00 Emma Sanchez [facility]

 

                          CREATINE KINASE 2023 00:36:00 Tracy Rudd

 [facility]

 

                          POCT GLUCOSE (AUTOMATED) 2023 00:35:00 Emma Sanchez [facility]

 

                          POCT GLUCOSE (AUTOMATED) 2023-02-10 14:11:00 Emma Sanchez [facility]

 

                          POCT GLUCOSE (AUTOMATED) 2023-02-10 04:50:00 Emma Sanchez [facility]

 

                          POCT GLUCOSE (AUTOMATED) 2023-02-10 01:29:00 Emma Sanchez [facility]

 

                          POCT GLUCOSE (AUTOMATED) 2023-02-10 00:32:00 Emma Sanchez [facility]

 

                          POCT GLUCOSE(AGE >30DAYS) 2023 15:31:00 Tracy Rudd [facility]

 

                          POCT GLUCOSE (AUTOMATED) 2023 15:29:00 Yonis Joya [facility]

 

                          POCT GLUCOSE (AUTOMATED) 2023 23:56:00 Yonis Joya [facility]

 

                                                    COVID-19 (MOLECULAR 

TESTING 





 NUCLEIC ACID 

AMPLIFICATION)      2023 23:48:00 Yonis Joya      [facility]

 

                                                    LAB ONLY COVID 

INTERPRETATION      2023 23:48:00 Yonis Joya      [facility]

 

                          CREATINE KINASE 2023 15:50:00 Yonis Joya

[facility]

 

                                                    THYROID STIMULATING 

HORMONE             2023 15:50:00 Yonis Joya      [facility]

 

                                                    COMP. METABOLIC PANEL 

([id number])             2023 15:50:00 Yonis Joya      [facility]

 

                          SALICYLATE   2023 15:50:00 [facility]

 

                          ETHANOL      2023 15:50:00 Toan [facility]

 

                          CBC WITH DIFF 2023 15:50:00 Yonis Joya

[facility]

 

                          URINALYSIS   2023 15:50:00 Yonis Joya [facility]

 

                                                    COVID-19 (ID NOW RAPID 

TESTING)            2023 15:50:00 Toan [facility]

 

                                                    LAB ONLY COVID 

INTERPRETATION      2023 15:50:00 Toan [facility]

 

                                                    URINE DRUG (IMMUNOASSAY) 

- COMPREHENSIVE DRUG 

SCREEN W/O REFLEX   2023 15:50:00 Toan [facility]

 

                          POCT GLUCOSE (AUTOMATED) 2023 [facility]54[facility]00 [facility]

 

                                                    CONSENT/REFUSAL FOR 

DIAGNOSIS AND TREATMENT   2023 14:49:26       Doctor Unassigned, No 

Name                                    [facility]

 

                          POCT HEMOGLOBIN A1C TEST 2023 20:00:00 Dion Solorzano [facility]

 

                          URINALYSIS   2022 14:51:00 PERFECTO Mchugh

[facility]

 

                          LIPASE       2022 14:50:00 PERFECTO Mchugh

[facility]

 

                          MAGNESIUM    2022 14:50:00 PERFECTO Mchugh

[facility]

 

                          TROPONIN I   2022 14:50:00 [facility]chugh [facility]

[facility]

 

                                                    COMP. METABOLIC PANEL 

([id number])             2022 14:50:00 PERFECTO Mchugh     [facility]

 

                                                    LIPID PANEL (92571)(TOTAL 

CHOLESTEROL, 

TRIGLYCERIDES, HDL) 2022 14:50:00 PERFECTO Mchugh     [facility]

 

                          CBC WITH DIFF 2022 14:50:00 PERFECTO Mchugh [facility]

 

                                                    CONSENT/REFUSAL FOR 

DIAGNOSIS AND TREATMENT   2022 13:59:42       Doctor Unassigned, No 

Name                                    [facility]

 

                                                    SARS-COV-2 COVID-19 

CLAUDIA-SUCROSE VACCINE 12 

YRS+, BIVALENT 0.3ML, IM, 

(PFIZER GRAY TOP BOOSTER) 2022-10-28 19:49:13 Jagjit Claire [facility]

 

                                                    FLU VACC ([id number]), 6 

MO-64 YRS, .5ML, IM, QUAD 

(FLUCELVAX)         2022-10-28 17:38:27 Matthew Long  [facility]

 

                          POCT HEMOGLOBIN A1C TEST 2022-10-28 17:36:00 Matthew Long [facility]

 

                                MEDICATION CORRESPONDENCE 2022 05:01:00 Do

ctor Unassigned, No 

Name                                    [facility]

 

                                MEDICATION CORRESPONDENCE 2022 05:01:00 Do

ctor Unassigned, No 

Name                                    [facility]







Encounters





                                                    Start 

Date/Time                               End 

Date/Time                               Encounter 

Type                                    Admission 

Type                                    Attending 

Clinicians                              Care 

Facility                                Care 

Department                              Encounter 

ID                                      Source

 

                                                    2023-02-10 

10:18:38            Outpatient                     [facility]       [id number]

[id number]

[id number]                                 [facility]

 

                                                    2023 

15:15:45            Outpatient                     [facility]       [id number]

[id number]

[id number]                                 [facility]

 

                                                    2021 

08:38:19                        Outpatient      EAMON LUND           [facility]            [place]             [id number]      [facility]

 

                                                    2021-10-31 

16:04:49                        Outpatient      EAMON LUND           [facility]            [place]             [id number]      [facility]

 

                                                    2025 

14:10:00                                2025 

14:10:00  Outpatient R                   [facility]      [id number] [facility]

 

                                                    2025 

00:00:00                                2025 

12:47:52            Telephone                               Janes Claire                               [facility] AT 

[place]                                    [id number]

[id number]

[id number]

[phone]

072                       [id number]                 [facility]

 

                                                    2025 

00:00:00                                2025 

10:23:41            Telephone                               Janes Claire                               [facility] 

[place]                                    [id number]

[id number]

[id number]

[phone]

072                       [id number]                 [facility]

 

                                                    2025 

08:00:00                                2025 

08:00:00  Outpatient R                   [facility]      [id number] [facility]

 

                                                    2024 

00:00:00                                2024 

16:27:08            Refill                                  Janes Claire                               [facility]                                    [id number]

[id number]

[id number]

[phone]

072                       [id number]                 [facility]

 

                                                    2024 

00:00:00                                2024 

18:19:35                                Patient 

Secure Msg                                          Blanche Perez                                [facility] 

PRIMARY 

CARE 

PAVILLION                               [id number]

[id number]

[id number]

[phone]

389                       [id number]                 [facility]

 

                                                    2024 

00:00:00                                2024-08-10 

18:22:02                                Patient 

Secure Msg                                          Doctor 

Unassigned, 

No Name

Doctor 

Unassigned, 

No Name                                 [facility] 

[place]                                    [id number]

[id number]

[id number]

[phone]

072                       [id number]                 [facility]

 

                                                    2024 

00:00:00                                2024 

13:39:00                                Patient 

Secure Msg                                          Blanche PerezVishnuisabelle                                [facility] 

PRIMARY 

CARE 

PAVILLION                               [id number]

[id number]

[id number]

[id number]

389                       [id number]                 [facility]

 

                                                    2024 

00:00:00                                2024 

08:16:34            Brian Mason                              [facility] 

PRIMARY 

CARE 

PAVILLION                               [id number]

[id number]

[id number]

[id number]

389                       [id number]                 [facility]

 

                                                    2024 

15:15:00                                2024 

15:30:00                                Technician 

Visit                                               Pcp-David Stevenson                                 [facility] 

PRIMARY 

CARE 

PAVRYDERON                               [id number]

[id number]

[id number]

[phone]

366                       [id number]                 [facility]

 

                                                    2024 

14:00:00                                2024 

14:30:00                                Office 

Visit                                               ChrisBlanche 

David Zurita                                 [facility] 

PRIMARY 

CARE 

PAVRYDERON                               [id number]

[id number]

[id number]

[phone]

389                       [id number]                 [facility]

 

                                                    2024 

14:00:00                                2024 

14:00:00            Outpatient          DAVID LÓPEZ          [facility]            [id number]      [facility]

 

                                                    2024 

00:00:00                                2024 

13:37:45            Dion Liao                                   [facility] 

PRIMARY 

CARE 

PAVILLION                               [id number]

[id number]

[id number]

[id number]

389                       [id number]                 [facility]

 

                                                    2024 

00:00:00                                2024 

10:09:37                                Patient 

Secure Msg                                          Brian Gonzáles                              [facility] 

PRIMARY 

CARE 

PAVILLION                               [id number]

[id number]

[id number]

[phone]

389                       [id number]                 [facility]

 

                                                    2024 

00:00:00                                2024 

10:48:12                                Patient 

Outreach                                            Linda Huitron                               [facility] 

PRIMARY 

CARE 

PAVILLION                               [id number]

[id number]

[id number]

[phone]

389                       [id number]                 [facility]

 

                                                    2024 

00:00:00                                2024 

09:00:21            Telephone                               Pcp, 

Patient 

Does Not 

Have A                                  [facility] 

PRIMARY 

CARE 

PAVILLION                               [id number]

[id number]

[id number]

[phone]

389                       [id number]                 [facility]

 

                                                    2024 

00:00:00                                2024 

15:23:08            Telephone                               Brian Gonzáles                              [facility] 

PRIMARY 

CARE 

PAVILLION                               [id number]

[id number]

[id number]

[phone]

389                       [id number]                 [facility]

 

                                                    2024 

09:00:00                                2024 

10:08:29            Outpatient          RYAN ASHFORD            [facility]            [id number]      [facility]

 

                                                    2024 

09:00:00                                2024 

10:08:29                                Office 

Visit                                               Janes Claire Karl E                                  [facility] 

SPECIALTY 

CARE 

CENTER AT 

[facility]                                   1.840.114

350.1.13.10

4.2.7.2.686

[phone]

072                       [id number]                 [facility]

 

                                                    2024 

08:30:00                                2024 

08:30:00            Outpatient          IFEANYI WILLARD        [facility]            [id number]      [facility]

 

                                                    2024 

00:00:00                                2024 

10:40:38            Dion Liao                                   [facility] 

PRIMARY 

CARE 

PAVILLION                               1.840.114

350.1.13.10

4.2.7.2.686

[phone]

389                       [id number]                 [facility]

 

                                                    2024 

00:00:00                                2024 

10:39:51            Johny Jameson                                 [facility] 

PRIMARY 

CARE 

PAVILLION                               1.2.840.114

350.1.13.10

4.2.7.2.686

[phone]

389                       [id number]                 [facility]

 

                                                    2024 

00:00:00                                2024 

17:51:35            Telephone                               Dion Solorzano                                   [facility] 

PRIMARY 

CARE 

PAVILLION                               1.2.840.114

350.1.13.10

4.2.7.2.686

[phone]

389                       [id number]                 [facility]

 

                                                    2024 

00:00:00                                2024 

17:50:41            Brian Bourgeois 

[facility] 

PRIMARY 

CARE 

PAVILLION                               1.2.840.114

350.1.13.10

4.2.7.2.686

[phone]

389                       [id number]                 [facility]

 

                                                    2024 

00:00:00                                2024 

16:44:07            Dung Dawson                                 [facility] 

SPECIALTY 

CARE 

CENTER AT 

[facility]                                   1.2.840.114

350.1.13.10

4.2.7.2.686

[phone]

072                       [id number]                 [facility]

 

                                                    2024 

00:00:00                                2024 

15:06:36            Brian Bourgeois 

[facility] 

PRIMARY 

CARE 

PAVILLION                               1.2.840.114

350.1.13.10

4.2.7.2.686

[phone]

389                       [id number]                 [facility]

 

                                                    2024 

00:00:00                                2024 

14:48:31            Telephone                               Dion Solorzano                                   [facility] 

PRIMARY 

CARE 

PAVILLION                               1.2.840.114

350.1.13.10

4.2.7.2.686

[phone]

389                       [id number]                 [facility]

 

                                                    2024 

00:00:00                                2024 

20:51:43            Brian Bourgeois                                   [facility] 

PRIMARY 

CARE 

PAVILLION                               1.2.840.114

350.1.13.10

4.2.7.2.686

[phone]

389                       [id number]                 [facility]

 

                                                    2024 

00:00:00                                2024 

09:05:20            Telephone                               [facility] 

[facility] 

[facility] 

[place] AT 

[facility]                                   1.2.840.114

350.1.13.10

4.2.7.2.686

[phone]

072                       [id number]                 [facility]

 

                                                    2024 

13:30:00                                2024 

13:30:00            Outpatient          BILL BOYD          [facility]            [id number]      [facility]

 

                                                    2024-04-10 

00:00:00                                2024-04-10 

00:00:00            Outpatient          BILL BOYD          [facility]            [id number]      [facility]

 

                                                    2024 

00:00:00                                2024 

00:00:00            Telephone                               Waller 

[facility] 

SPECIALTY 

CARE 

[place] AT 

[facility]                                   1.2.840.114

350.1.13.10

4.2.7.2.686

[phone]

072                       [id number]                 [facility]

 

                                                    2024 

00:00:00                                2024 

00:00:00            Telephone                               [facility] 

[facility] 

[place] AT 

[facility]                                   1.2.840.114

350.1.13.10

4.2.7.2.686

[phone]

072                       [id number]                 [facility]

 

                                                    2024 

00:00:00                                2024 

00:00:00            Telephone                               Waller 

[facility] 

SPECIALTY 

CARE 

[place] AT 

[facility]                                   1.2.840.114

350.1.13.10

4.2.7.2.686

[phone]

072                       [id number]                 [facility]

 

                                                    2024 

00:00:00                                2024 

00:00:00            Telephone                               Eamon Simon                                   [facility] 

SPECIALTY 

CARE 

CENTER AT 

[facility]                                   1.2.840.114

350.1.13.10

4.2.7.2.686

[phone]

072                       [id number]                 [facility]

 

                                                    2024 

00:00:00                                2024 

00:00:00            Telephone                               [facility] 

[facility]                                   1.2.840.114

350.1.13.10

4.2.7.2.686

[phone]

072                       [id number]                 [facility]

 

                                                    2024 

00:00:00                                2024 

00:00:00            Telephone                               [facility] 

[facility]                                   1.2.840.114

350.1.13.10

4.2.7.2.686

[phone]

072                       [id number]                 [facility]

 

                                                    2024 

00:00:00                                2024 

00:00:00            Telephone                               [facility] 

SPECIALTY 

CARE 

[place] AT 

[facility]                                   1.2.840.114

350.1.13.10

4.2.7.2.686

[phone]

072                       [id number]                 [facility]

 

                                                    2024 

00:00:00                                2024 

00:00:00            Telephone                               [facility] 

[facility] 

[place] AT 

[facility]                                   1.2.840.114

350.1.13.10

4.2.7.2.686

[phone]

072                       [id number]                 [facility]

 

                                                    2024 

00:00:00                                2024 

00:00:00            Telephone                               [facility] 

[facility] 

[place] AT 

[facility]                                   1.2.840.114

350.1.13.10

4.2.7.2.686

[phone]

072                       [id number]                 [facility]

 

                                                    2024 

00:00:00                                2024 

00:00:00            Outpatient          BILL BOYD          [facility]            [id number]      [facility]

 

                                                    2024 

00:00:00                                2024 

00:00:00                                Orders 

Only                                                Doctor 

Unassigned, 

No Name                                 [facility]                                1.840.114

350.1.13.10

4.2.7.2.686

[phone]

009                       [id number]                 [facility]

 

                                                    2024 

00:00:00                                2024 

00:00:00            Telephone                               Dung Waller                                 [facility] 

SPECIALTY 

CARE 

[place] AT 

[facility]                                   1..114

350.1.13.10

4.2.7.2.686

[phone]

072                       [id number]                 [facility]

 

                                                    2024 

00:00:00                                2024 

00:00:00            Outpatient          BILL BOYD          [facility]            [id number]      [facility]

 

                                                    2023 

00:00:00                                2023 

00:00:00                                Patient 

Secure Msg                                          Doctor 

Unassigned, 

No Name                                 [facility]                                 1..114

350.1.13.10

4.2.7.2.686

[phone]

804                       [id number]                 [facility]

 

                                                    2023 

14:30:00                                2023 

14:45:00                                Technician 

Visit                                               Lab, [facility]

Bill Nixon                             [facility] AT 

[facility]                                   1..114

350.1.13.10

4.2.7.2.686

[phone]

353                       [id number]                 [facility]

 

                                                    2023 

14:00:00                                2023 

14:00:00                                Office 

Visit                                               Dung Waller Joseph Marc                             [facility] AT 

[facility]                                   1.840.114

350.1.13.10

4.2.7.2.686

[phone]

072                       [id number]                 [facility]

 

                                                    2023 

14:00:00                                2023 

13:42:53            Outpatient          R                   BILL NIXON          [facility]            [id number]      [facility]

 

                                                    2023 

00:00:00                                2023 

00:00:00                                Patient 

Secure Msg                                          Doctor 

Unassigned, 

No Name                                 [facility]                                1.2.840.114

350.1.13.10

4.2.7.2.686

[phone]

019                       [id number]                 [facility]

 

                                                    2023 

14:00:00                                2023 

14:15:00                                Technician 

Visit                                               [facility] 

[facility] 

[facility] 

PAVILLION                               1.2.840.114

350.1.13.10

4.2.7.2.686

[phone]

366                       [id number]                 [facility]

 

                                                    2023 

13:30:00                                2023 

14:11:05            Outpatient          R                   [facility]            [id number]      [facility]

 

                                                    2023 

13:30:00                                2023 

14:11:05                                Office 

Visit                                               Dion oSlorzano 

[facility] 

PRIMARY 

CARE 

PAVILLION                               1.2.840.114

350.1.13.10

4.2.7.2.686

[phone]

389                       [id number]                 [facility]

 

                                                    2023 

13:30:00                                2023 

13:30:00  Outpatient R                   [facility]      [id number] [facility]

 

                                                    2023 

00:00:00                                2023 

00:00:00                                Patient 

Secure Msg                                          Doctor 

Unassigned, 

No Name                                 [facility]                                1.2.840.114

350.1.13.10

4.2.7.2.686

[phone]

019                       [id number]                 [facility]

 

                                                    2023 

16:27:00                                2023 

19:12:00            Emergency           X                   SHIRA COY       [facility]            [place]             [id number]      [facility]

 

                                                    2023 

16:27:00                                2023 

19:12:00            Emergency                               Leobardo, 

Shira                               [facility]                                  1.2.840.114

350.1.13.10

4.2.7.2.686

[phone]

084                       [id number]                 [facility]

 

                                                    2023 

00:00:00                                2023 

00:00:00            Telephone                               Dion Solorzano                                   [facility] 

PRIMARY 

CARE 

[place]                               1.2.840.114

350.1.13.10

4.2.7.2.686

[phone]

389                       [id number]                 [facility]

 

                                                    2023 

19:56:00                                2023 

22:52:00            Emergency           X                   TRACY RUDD          [facility]            [place]             [id number]      [facility]

 

                                                    2023 

19:56:00                                2023 

22:52:00            Emergency                               Tracy Rudd                                [facility]                                  1.2.840.114

350.1.13.10

4.2.7.2.686

[phone]

084                       [id number]                 [facility]

 

                                                    2023 

14:45:00                                2023 

15:00:00                                Technician 

Visit                                               Pcp-Lab

Pathology                               [facility] 

PRIMARY 

CARE 

[place]                               1.2.840.114

350.1.13.10

4.2.7.2.686

[phone]

366                       [id number]                 [facility]

 

                                                    2023 

14:45:00                                2023 

14:45:00  Outpatient R         PATHOLOGY [facility]      [id number] [facility]

 

                                                    2023 

00:00:00                                2023 

00:00:00                                Orders 

Only                                                Doctor 

Unassigned, 

No Name                                 [facility]                                1.2.840.114

350.1.13.10

4.2.7.2.686

[phone]

009                       [id number]                 [facility]

 

                                                    2023 

16:20:00                                2023 

16:36:21            Outpatient          R                   BUTCH RUIZ         [facility]            [id number]      [facility]

 

                                                    2023 

16:20:00                                2023 

16:36:21                                Office 

Visit                                               Dion Solorzano Marissa                                 [facility] 

PRIMARY 

CARE 

PAVILLION                               1.2.840.114

350.1.13.10

4.2.7.2.686

[phone]

389                       [id number]                 [facility]

 

                                                    2023 

17:47:00                                2023 

20:34:00            Emergency           X                   ANDRIY WEAVER          [facility]            [place]             [id number]      [facility]

 

                                                    2023 

17:47:00                                2023 

20:34:00            Emergency                               Andriy Weaver                                  [facility]                                  1.2.840.114

350.1.13.10

4.2.7.2.686

[phone]

084                       [id number]                 [facility]

 

                                                    2023 

13:12:00                                2023 

15:39:00            Emergency           X                   LORENE GUERRERO          [facility]            [place]             [id number]      [facility]

 

                                                    2023 

13:12:00                                2023 

15:39:00            Emergency                               Lorene Guerrero                              [facility]                                  1.2.840.114

350.1.13.10

4.2.7.2.686

[phone]

084                       [id number]                 [facility]

 

                                                    2023 

00:00:00                                2023 

00:00:00            Telephone                               Dion Solorzano                                   [facility] 

PRIMARY 

CARE 

PAVILLION                               1.2.840.114

350.1.13.10

4.2.7.2.686

[phone]

389                       [id number]                 [facility]

 

                                                    2023 

00:00:00                                2023 

00:00:00            Telephone                               Dion Solorzano                                   [facility] 

PRIMARY 

CARE 

PAVILLION                               1.2.840.114

350.1.13.10

4.2.7.2.686

[phone]

389                       [id number]                 [facility]

 

                                                    2023 

15:47:00                                2023 

16:24:00            Emergency           X                   OLAF STRICKLAND         [facility]            [place]             [id number]      [facility]

 

                                                    2023 

15:47:00                                2023 

16:24:00            Emergency                               Olaf Strickland                                 [facility]                                  1.2.840.114

350.1.13.10

4.2.7.2.686

[phone]

084                       [id number]                 [facility]

 

                                                    2023 

00:00:00                                2023 

00:00:00                                Patient 

Outreach                                            Linda Huitron                               [facility] 

PRIMARY 

CARE 

PAVILLION                               1.2.840.114

350.1.13.10

4.2.7.2.686

[phone]

389                       [id number]                 [facility]

 

                                                    2023 

00:00:00                                2023 

00:00:00            Telephone                               Dion Solorzano                                   [facility] 

PRIMARY 

CARE 

PAVILLION                               1.2.840.114

350.1.13.10

4.2.7.2.686

[phone]

389                       [id number]                 [facility]

 

                                                    2023 

21:36:00                                2023 

00:02:00            Emergency           X                   [facility] 

[facility]     [facility]            [place]             [id number]      [facility]

 

                                                    2023 

21:36:00                                2023 

00:02:00            Emergency                               [facility] 

[facility]                             [facility]                                  1.2.840.114

350.1.13.10

4.2.7.2.686

[phone]

084                       [id number]                 [facility]

 

                                                    2023 

00:00:00                                2023 

00:00:00            Refill                                  Dion Solorzano                                   [facility] 

PRIMARY 

CARE 

PAVILLION                               1.2.840.114

350.1.13.10

4.2.7.2.686

[phone]

389                       [id number]                 [facility]

 

                                                    2023 

00:00:00                                2023 

00:00:00                                Patient 

Secure Dion Aguirre                                   [facility] 

PRIMARY 

CARE 

PAVILLION                               1.2.840.114

350.1.13.10

4.2.7.2.686

[phone]

389                       [id number]                 [facility]

 

                                                    2023-05-15 

00:00:00                                2023-05-15 

00:00:00            Telephone                               Martín Elder                                   [facility] 

PRIMARY 

CARE 

PAVILLION                               1.2.840.114

350.1.13.10

4.2.7.2.686

[phone]

389                       [id number]                 [facility]

 

                                                    2023 

00:00:00                                2023 

00:00:00                                Orders 

Only                                                Doctor 

Unassigned, 

No Name                                 [facility]                                1.2.840.114

350.1.13.10

4.2.7.2.686

[phone]

009                       [id number]                 [facility]

 

                                                    2023 

00:00:00                                2023 

00:00:00            Telephone                               Dung Waller                                 [facility] 

SPECIALTY 

CARE 

CENTER AT 

[facility]                                   1.2.840.114

350.1.13.10

4.2.7.2.686

[phone]

072                       [id number]                 [facility]

 

                                                    2023 

00:00:00                                2023 

00:00:00            Telephone                               Martín Elder                                   [facility] 

PRIMARY 

CARE 

PAVILLION                               1.2.840.114

350.1.13.10

4.2.7.2.686

[phone]

389                       [id number]                 [facility]

 

                                                    2023 

00:00:00                                2023 

00:00:00            Refill                                  Martín Elder                                   [facility] 

PRIMARY 

CARE 

PAVILLION                               1.2.840.114

350.1.13.10

4.2.7.2.686

[phone]

389                       [id number]                 [facility]

 

                                                    2023 

20:43:00                                2023 

23:30:00            Emergency           X                   MARKELL KAUFFMAN            [facility]            ERT             [id number]      [facility]

 

                                                    2023 

20:43:00                                2023 

23:30:00            Emergency                               Markell Kauffman                                  [facility]                                  1.2.840.114

350.1.13.10

4.2.7.2.686

[phone]

084                       [id number]                 [facility]

 

                                                    2023 

00:00:00                                2023 

00:00:00                                Orders 

Only                                                Doctor 

Unassigned, 

No Name                                 [facility]                                1.2.840.114

350.1.13.10

4.2.7.2.686

[phone]

009                       [id number]                 [facility]

 

                                                    2023 

16:00:00                                2023 

16:15:00                                Technician 

Visit                                               Pcp-Lab

Pathology                               [facility] 

PRIMARY 

CARE 

PAVILLION                               1.2.840.114

350.1.13.10

4.2.7.2.686

[phone]

366                       [id number]                 [facility]

 

                                                    2023 

15:10:00                                2023 

15:40:00                                Office 

Visit                                               Martín Elder Paul Sesung                                  [facility] 

PRIMARY 

CARE 

PAVILLION                               1.2.840.114

350.1.13.10

4.2.7.2.686

[phone]

389                       [id number]                 [facility]

 

                                                    2023 

15:10:00                                2023 

15:10:00  Outpatient JOSEPH MELVIN [facility]      [id number] [facility]

 

                                                    2023 

18:17:00                                2023 

22:30:00            Emergency           X                   LUBNA PIMENTEL          [facility]            ERT             [id number]      [facility]

 

                                                    2023 

18:17:00                                2023 

22:30:00            Emergency                               Lubna Pimentel G                                [facility]                                  1.2.840.114

350.1.13.10

4.2.7.2.686

[phone]

084                       [id number]                 [facility]

 

                                                    2023 

14:20:00                                2023 

14:20:00  Outpatient JOSEPH MELVIN [facility]      [id number] [facility]

 

                                                    2023-03-15 

21:29:00                                2023-03-15 

22:01:00            Emergency           X                   JAGJIT LEO     [facility]            ERT             [id number]      [facility]

 

                                                    2023-03-15 

21:29:00                                2023-03-15 

22:01:00            Emergency                               Jagjit Leo                             [facility]                                  1.2.840.114

350.1.13.10

4.2.7.2.686

[phone]

084                       [id number]                 [facility]

 

                                                    2023 

00:00:00                                2023 

00:00:00            Refill                                  AlfredGillesav                                   [facility] 

PRIMARY 

CARE 

PAVILLION                               1.2.840.114

350.1.13.10

4.2.7.2.686

[phone]

389                       [id number]                 [facility]

 

                                                    2023 

10:58:00                                2023 

23:59:00                                Hospital 

Encounter                                           Brian Brock                               [facility]                                      1.2.840.114

350.1.13.10

4.2.7.2.686

[phone]

043                       [id number]                 [facility]

 

                                                    2023 

08:59:00                                2023 

09:39:00            Emergency           X                   SCHOENSTEIN , LYNDA         [facility]            ERT             [id number]      [facility]

 

                                                    2023 

08:59:00                                2023 

09:39:00            Emergency                               Ebrahim, Rania Jayes, Julio C Schoenstein , Lynda                                 [facility]                                  1.2.840.114

350.1.13.10

4.2.7.2.686

[phone]

084                       [id number]                 [facility]

 

                                                    2023 

00:00:00                                2023 

00:00:00            Outpatient          BRIAN MATUTE         [facility]            NUT             [id number]      [facility]

 

                                                    2023 

13:30:00                                2023 

13:54:34  Outpatient ARMAAN ALVAREZ [facility]      [id number] [facility]

 

                                                    2023 

13:30:00                                2023 

13:54:34                                Office 

Visit                                               Dion Solorzano Leah Elizabeth                               [facility] 

PRIMARY 

CARE 

PAVILLION                               1.2.840.114

350.1.13.10

4.2.7.2.686

[phone]

389                       [id number]                  [facility]

 

                                                    2023 

00:00:00                                2023 

00:00:00            Refill                                  HedyashdomitilaBharathifelix                               [facility] 

PRIMARY 

CARE 

PAVILLION                               1.2.840.114

350.1.13.10

4.2.7.2.686

[phone]

389                       [id number]                  [facility]

 

                                                    2023 

00:00:00                                2023 

00:00:00            Refill                                  Heribertontdomitila 

Germántitafelix                               [facility] 

PRIMARY 

CARE 

PAVILLION                               1.2.840.114

350.1.13.10

4.2.7.2.686

[phone]

389                       [id number]                  [facility]

 

                                                    2023 

00:00:00                                2023 

00:00:00            Refill                                  Karenmartjennifer washington Tita                              [facility] 

PRIMARY 

CARE 

PAVILLION                               1.2.840.114

350.1.13.10

4.2.7.2.686

[phone]

389                       [id number]                  [facility]

 

                                                    2022 

08:26:00                                2022 

11:38:00  Emergency X         PERFECTO MCHUGH [facility]      ERT       [id number] [facility]

 

                                                    2022 

08:26:00                                2022 

11:38:00            Emergency                               PERFECTO Mchugh                                   [facility]                                  1.2.840.114

350.1.13.10

4.2.7.2.686

[phone]

084                       [id number]                  [facility]

 

                                                    2022 

00:00:00                                2022 

00:00:00            Telephone                               Kimberly Gorman                               [facility] 

[facility]                                  1.2.840.114

350.1.13.10

4.2.7.2.686

[phone]

389                       [id number]                  [facility]

 

                                                    2022 

00:00:00                                2022 

00:00:00                                Case 

Management                                          Linda Huitron                               [facility] 

PRIMARY 

CARE 

PAVILLION                               1.2840.114

350.1.13.10

4.2.7.2.686

[phone]

389                       [id number]                  [facility]

 

                                                    2022-10-28 

13:30:00                                2022-10-28 

15:03:08            Outpatient          JAGJIT FRANKLIN     [facility]            [id number]      [facility]

 

                                                    2022-10-28 

13:30:00                                2022-10-28 

15:03:08                                Office 

Visit                                               Kimberly Gorman, 

Attending

Josie 

[facility] 

PRIMARY 

CARE 

PAVILLION                               1.0.114

350.1.13.10

4.2.7.2.686

[phone]

389                       [id number]                  [facility]

 

                                                    2022-10-28 

14:45:00                                2022-10-28 

15:00:00                                Technician 

Visit                                               Pcp-Lab

Doroteo ClaireRoper Hospitalnba                             [facility] 

PRIMARY 

CARE 

PAVILLION                               1.0.114

350.1.13.10

4.2.7.2.686

[phone]

366                       [id number]                  [facility]

 

                                                    2022-10-28 

13:30:00                                2022-10-28 

13:30:00            Outpatient          R                   JAGJIT CLAIRE     [facility]            [id number]      [facility]

 

                                                    2022 

00:00:00                                2022 

00:00:00                                Orders 

Only                                                Doctor 

Unassigned, 

No Name                                 [facility]                                1..114

350.1.13.10

4.2.7.2.686

[phone]

009                       [id number]                  [facility]

 

                                                    2022 

00:00:00                                2022 

00:00:00            Telephone                               Dung Waller                                 [facility] 

SPECIALTY 

CARE 

CENTER AT 

[facility]                                   1..114

350.1.13.10

4.2.7.2.686

[phone]

072                       [id number]                  [facility]

 

                                                    2022 

00:00:00                                2022 

00:00:00            Refill                                  Tita Rodriguez                              [facility] 

PRIMARY 

CARE 

PAVILLION                               1.2.840.114

350.1.13.10

4.2.7.2.686

[phone]

389                       [id number]                  [facility]

 

                                                    2022 

00:00:00                                2022 

00:00:00                                Orders 

Only                                                Doctor 

Unassigned, 

No Name                                 [facility]                                1.2.840.114

350.1.13.10

4.2.7.2.686

[phone]

009                       [id number]                  [facility]

 

                                                    2022 

14:30:00                                2022 

15:00:00                                Office 

Visit                                               Dung Waller Joseph Marc                             [facility] 

SPECIALTY 

CARE 

CENTER AT 

[facility]                                   1.2840.114

350.1.13.10

4.2.7.2.686

[phone]

072                       [id number]                  [facility]

 

                                                    2022 

14:30:00                                2022 

14:30:00            Outpatient          R                   BILL NIXON          [facility]            [id number]      [facility]

 

                                                    2022 

00:00:00                                2022 

00:00:00            Telephone                               Tita Rodriguez                              [facility] 

PRIMARY 

CARE 

PAVILLION                               1.2840.114

350.1.13.10

4.2.7.2.686

[phone]

389                       [id number]                  [facility]

 

                                                    2022 

00:00:00                                2022 

00:00:00                                Patient 

Secure Msg                                          Tita Rodriguez                              [facility] 

PRIMARY 

CARE 

PAVILLION                               1.2.840.114

350.1.13.10

4.2.7.2.686

[phone]

389                       [id number]                  [facility]

 

                                                    2022 

15:40:00                                2022 

16:41:31                                Office 

Visit                                               Tita Rodriguez

Unknown, 

Attending

Mika Jaramillo                               [facility] 

PRIMARY 

CARE 

PAVILLION                               1.84114

350.1.13.10

4.2.7.2.686

[phone]

389                       [id number]                  [facility]

 

                                                    2022 

15:40:00                                2022 

16:41:31            Outpatient          R                   MIKA JARAMILLO       [facility]            [id number]      [facility]

 

                                                    2022 

15:40:00                                2022 

16:41:31            Outpatient          R                   MIKA JARAMILLO       [facility]            [id number]      [facility]

 

                                                    2022 

16:15:00                                2022 

16:30:00                                Technician 

Visit                                               Pcp-Lab

Unknown, 

Attending                               [facility] 

PRIMARY 

CARE 

PAVILLION                               1.84.114

350.1.13.10

4.2.7.2.686

[phone]

366                       [id number]                  [facility]

 

                                                    2022 

16:15:00                                2022 

16:15:00            Outpatient          R                   UNKNOWN, 

ATTENDING       [facility]            [id number]      [facility]

 

                                                    2022 

00:00:00                                2022 

00:00:00                                Patient 

Secure Msg                                          Doctor 

Unassigned, 

No Name                                 [facility] 

[facility] [place] 

[facility]                                  1..114

350.1.13.10

4.2.7.2.686

[phone]

389                       [id number]                  [facility]

 

                                                    2022 

00:00:00                                2022 

00:00:00            Refill                                  Karenkait

felixTita                              [facility] 

PRIMARY 

CARE 

PAVILLION                               1.84.114

350.1.13.10

4.2.7.2.686

[phone]

389                       [id number]                  [facility]

 

                                                    2022 

10:15:00                                2022 

10:30:00                                Technician 

Visit                                               Pob, Adc 

Lab Main

Germán Jaramillotyeshafelix                               [facility]

[facility] 

[facility]                                1.2840.114

350.1.13.10

4.2.7.2.686

[phone]

353                       [id number]                  [facility]

 

                                                    2022 

10:15:00                                2022 

10:15:00            Outpatient          R                   BHARATHI JARAMILLOFELIX       [facility]            [id number]      [facility]

 

                                                    2022 

00:00:00                                2022 

00:00:00                                Patient 

Secure Msg                                          Tita Rodriguez                              [facility] 

PRIMARY 

CARE 

PAVILLION                               1.2.840.114

350.1.13.10

4.2.7.2.686

[phone]

389                       [id number]                  [facility]

 

                                                    2022 

00:00:00                                2022 

00:00:00            [facility]ill                                  [facility] 

[facility] 

[facility] 

PAVILLION                               1.2.840.114

350.1.13.10

4.2.7.2.686

[phone]

389                       [id number]                  [facility]

 

                                                    2022 

00:00:00                                2022 

00:00:00            Refill                                  [facility] 

[facility] 

[facility] 

PAVILLION                               1.2.840.114

350.1.13.10

4.2.7.2.686

[phone]

389                       [id number]                  [facility]

 

                                                    2022 

00:00:00                                2022 

00:00:00            Refill                                  Tita Rodriguez                              [facility] 

PRIMARY 

CARE 

PAVILLION                               1.2.840.114

350.1.13.10

4.2.7.2.686

[phone]

389                       [id number]                  [facility]

 

                                                    2022 

00:00:00                                2022 

00:00:00            Refill                                  Kary Strauss                                  [facility] 

OFFICE 

BUILDING 

ONE                                     1.2840.114

350.1.13.10

4.2.7.2.686

[phone]

044                       [id number]                  [facility]

 

                                                    2022 

00:00:00                                2022 

00:00:00                                Patient 

Secure Msg                                          Doctor 

Unassigned, 

No Name                                 [facility] 

[facility]KATERYNA [facility]SAY 

MEDICAL 

OFFICE 

BUILDING                                1.84.114

350.1.13.10

4.2.7.2.686

[phone]

044                       [id number]                  [facility]

 

                                                    2022-02-15 

00:00:00                                2022-02-15 

00:00:00            Refill                                  Kary Strauss 

[facility] 

OFFICE 

BUILDING 

ONE                                     1.84.114

350.1.13.10

4.2.7.2.686

[phone]

044                       [id number]                  [facility]

 

                                                    2022 

14:30:00                                2022 

15:20:49            Outpatient          R                   UNKNOWN, 

ATTENDING       [facility]            [id number]      [facility]

 

                                                    2022 

14:30:00                                2022 

15:20:49                                Office 

Visit                                               Tita Rodriguez

Unknown, 

Attending

Bharathi Jaramillofelix                               [facility] 

PRIMARY 

CARE 

PAVILLION                               1.84.114

350.1.13.10

4.2.7.2.686

[phone]

389                       [id number]                  [facility]

 

                                                    2022 

14:30:00                                2022 

15:20:49            Outpatient          R                   MIKA JARAMILLO       [facility]            [id number]      [facility]

 

                                                    2022 

00:00:00                                2022 

00:00:00            Refill                                  Kary Strauss 

[facility] 

OFFICE 

BUILDING 

ONE                                     .84.114

350.1.13.10

4.2.7.2.686

[phone]

044                       [id number]                  [facility]

 

                                                    2022 

00:00:00                                2022 

00:00:00            Refill                                  Eamon Simon                                   [facility] 

PRIMARY 

CARE 

PAVILLION                               1.2.840.114

350.1.13.10

4.2.7.2.686

[phone]

389                       [id number]                  [facility]

 

                                                    2022 

00:00:00                                2022 

00:00:00            Refdiaz Simon 

Eamon                                   [facility] 

PRIMARY 

CARE 

PAVILLION                               1.2.840.114

350.1.13.10

4.2.7.2.686

[id number]

389                       [id number]                  [facility]

 

                                                    2022 

00:00:00                                2022 

00:00:00            Refdiaz Simon 

[facility] 

PRIMARY 

CARE 

PAVILLION                               1.2.840.114

350.1.13.10

4.2.7.2.686

[phone]

389                       [id number]                  [facility]

 

                                                    2021 

00:00:00                                2021 

00:00:00            Telephone                               Tita Rodriguez                              [facility] 

PRIMARY 

CARE 

PAVILLION                               1.2.840.114

350.1.13.10

4.2.7.2.686

[phone]

389                       [id number]                  [facility]

 

                                                    2021 

00:00:00                                2021 

00:00:00                                Orders 

Only                                                Doctor 

Unassigned, 

No Name                                 [facility]                                1.2.840.114

350.1.13.10

4.2.7.2.686

[phone]

009                       [id number]                  [facility]

 

                                                    2021 

19:08:00                                2021 

23:32:00            Emergency           X                   MARY BETH CORCORAN         [facility]            ERT             [id number]      [facility]

 

                                                    2021 

19:08:00                                2021 

23:32:00            Emergency                               [place] 

Mary Beth                                 [facility]                                  1.2.840.114

350.1.13.10

4.2.7.2.686

[phone]

084                       [id number]                  [facility]

 

                                                    2021 

19:08:00                                2021 

23:32:00            Emergency           X                   MARY BETH CORCORAN         [facility]            ERT             [id number]      [facility]

 

                                                    2021 

00:00:00                                2021 

00:00:00                                Orders 

Only                                                Doctor 

Unassigned, 

No Name                                 [facility]                                1.2840.114

350.1.13.10

4.2.7.2.686

[phone]

009                       [id number]                  [facility]

 

                                                    2021 

00:00:00                                2021 

00:00:00            Refill                                  Carina Womack                                 [facility]                                1.2840.114

350.1.13.10

4.2.7.2.686

[phone]

044                       [id number]                  [facility]

 

                                                    2021 

00:00:00                                2021 

00:00:00            Refill                                  Kary Strauss 

[facility] 

OFFICE 

BUILDING 

ONE                                     1.840.114

350.1.13.10

4.2.7.2.686

[phone]

044                       [id number]                  [facility]

 

                                                    2021 

00:00:00                                2021 

00:00:00            Refill                                  Gen 

Edward                                  [facility] 

PRIMARY 

CARE 

PAVILLION                               1.20.114

350.1.13.10

4.2.7.2.686

[phone]

389                       [id number]                  [facility]

 

                                                    2021 

13:30:00                                2021 

13:30:00            Outpatient          R                   EAMON SIMON           [facility]            [id number]      [facility]

 

                                                    2021 

13:30:00                                2021 

13:30:00                                Office 

Visit                                               Eamon Simon                                   [facility] 

SPECIALTY 

CARE 

CENTER AT 

[facility]                                   1.0.114

350.1.13.10

4.2.7.2.686

[phone]

072                       [id number]                  [facility]

 

                                                    2021 

13:30:00                                2021 

13:28:21            Outpatient          R                   [facility]            [id number]      [facility]

 

                                                    2021 

13:30:00                                2021 

13:28:21            Outpatient          R                   EAMON SIMON           [facility]            [id number]      [facility]

 

                                                    2021 

00:00:00                                2021 

00:00:00            Telephone                               Alfred 

Eamon                                   [facility] 

SPECIALTY 

CARE 

CENTER AT 

[facility]                                   1..840.114

350.1.13.10

4.2.7.2.686

[phone]

072                       [id number]                  [facility]

 

                                                    2021 

08:49:34                                2021 

23:59:00            Outpatient          R                   ALFRED 

EAMON           [facility]            [id number]      [facility]

 

                                                    2021 

08:49:34                                2021 

23:59:00            Outpatient          R                   ALFRED 

[facility]            [id number]      [facility]

 

                                                    2021 

08:30:00                                2021 

23:59:00                                Hospital 

Encounter                                           Gilles Simonav                                   [facility]                                 ..840.114

350.1.13.10

4.2.7.2.686

[phone]

804                       [id number]                  [facility]

 

                                                    2021 

00:00:00                                2021 

00:00:00            Kary Steele 

[facility] 

ONE                                     ..840.114

350.1.13.10

4.2.7.2.686

[phone]

044                       [id number]                  [facility]

 

                                                    2021-10-27 

00:00:00                                2021-10-27 

00:00:00            Outpatient          R                   GILLES SIMONAV           [facility]            [id number]      [facility]

 

                                                    2021-10-27 

00:00:00                                2021-10-27 

00:00:00            Outpatient          R                   EAMON SIMON           [facility]            [id number]      [facility]

 

                                                    2021-10-26 

00:00:00                                2021-10-26 

00:00:00            Telephone                               Eamon Simon                                   [facility] 

SPECIALTY 

CARE 

CENTER AT 

[facility]                                   1.114

350.1.13.10

4.2.7.2.686

[phone]

072                       [id number]                  [facility]

 

                                                    2021-10-25 

09:12:43                                2021-10-25 

09:22:43                                Imm/Inj 

Visit                                               Nurse, Pcp 

Immunizatio

Shamar Stafford                                  [facility] 

PRIMARY 

CARE 

PAVILLION                               1.114

350.1.13.10

4.2.7.2.686

[phone]

421                       [id number]                  [facility]

 

                                                    2021-10-25 

09:00:00                                2021-10-25 

09:00:00            Outpatient          VALENTE GARDINER 

[facility]            [id number]      [facility]

 

                                                    2021-10-25 

09:00:00                                2021-10-25 

09:00:00            Outpatient          SHAMAR TALLEY          [facility]            [id number]      [facility]

 

                                                    2021-10-20 

00:00:00                                2021-10-20 

00:00:00            Kary Steele 

[facility] 

Office 

Building 

One                                     1.114

350.1.13.10

4.2.7.2.686

[phone]

044                       [id number]                  [facility]

 

                                                    2021-10-20 

00:00:00                                2021-10-20 

00:00:00            Abbe Hernandez                                  [facility] 

PRIMARY 

CARE 

PAVILLION                               1.114

350.1.13.10

4.2.7.2.686

[phone]

389                       [id number]                  [facility]

 

                                                    2021-10-20 

00:00:00                                2021-10-20 

00:00:00                                Patient 

Secure Msg                                          Doctor 

Unassigned, 

No Name                                 [facility] 

[facility]                                  1.114

350.1.13.10

4.2.7.2.686

[phone]

389                       [id number]                  [facility]

 

                                                    2021-10-04 

00:00:00                                2021-10-04 

00:00:00                                Letter 

(Out)                                               [place] 

Fern ALVARADO                                   [facility] 

PRIMARY 

CARE 

PAVILLION                               1.840.114

350.1.13.10

4.2.7.2.686

[phone]

389                       [id number]                  [facility]

 

                                                    2021-10-04 

00:00:00                                2021-10-04 

00:00:00                                Case 

Management                                          Fern Pitts                                   [facility] 

CENTER 

[facility]                                  1.0.114

350.1.13.10

4.2.7.2.686

[phone]

389                       [id number]                  [facility]

 

                                                    2021-10-02 

00:00:00                                2021-10-02 

00:00:00                                Patient 

Secure Msg                                          Doctor 

Unassigned, 

No Name                                 [facility]                                1.0.114

350.1.13.10

4.2.7.2.686

[phone]

019                       [id number]                  [facility]

 

                                                    2021-10-01 

15:08:17                                2021-10-01 

16:51:40                                Office 

Visit                                               Abbe Blevins 

[facility] 

PRIMARY 

CARE 

PAVILLION                               1.0.114

350.1.13.10

4.2.7.2.686

[phone]

389                       [id number]                  [facility]

 

                                                    2021-10-01 

15:10:00                                2021-10-01 

15:10:00            Outpatient          R                   ROBBY 

[facility]            [id number]      [facility]

 

                                                    2021-10-01 

00:00:00                                2021-10-01 

00:00:00                                Orders 

Only                                                Doctor 

Unassigned, 

No Name                                 [facility]                                1.20.114

350.1.13.10

4.2.7.2.686

[phone]

009                       [id number]                  [facility]

 

                                                    2021 

00:00:00                                2021 

00:00:00            Kary Steele                                  [facility] 

Office 

Building 

One                                     1.0.114

350.1.13.10

4.2.7.2.686

[phone]

044                       [id number]                  [facility]

 

                                                    2021 

00:00:00                                2021 

00:00:00            Telephone                               [facility] 

SPECIALTY 

CARE 

[place] AT 

[facility]                                   1.840.114

350.1.13.10

4.2.7.2.686

[phone]

072                       [id number]                  [facility]

 

                                                    2021 

00:00:00                                2021 

00:00:00                                Orders 

Only                                                Doctor 

Unassigned, 

No Name                                 [facility]                                1.840.114

350.1.13.10

4.2.7.2.686

[phone]

009                       [id number]                  [facility]

 

                                                    2021 

00:00:00                                2021 

00:00:00            Salty Strauss 

[facility] 

One                                     1.0.114

350.1.13.10

4.2.7.2.686

[phone]

044                       [id number]                  [facility]

 

                                                    2021 

00:00:00                                2021 

00:00:00            Telephone                               [facility]                                   .840.114

350.1.13.10

4.2.7.2.686

[phone]

072                       [id number]                  [facility]

 

                                                    2021 

00:00:00                                2021 

00:00:00                                Orders 

Only                                                Doctor 

Unassigned, 

No Name                                 [facility]                                .0.114

350.1.13.10

4.2.7.2.686

[phone]

009                       [id number]                  [facility]

 

                                                    2021 

00:00:00                                2021 

00:00:00            Telephone                               [facility]                                   1.840.114

350.1.13.10

4.2.7.2.686

[phone]

072                       [id number]                  [facility]

 

                                                    2021 

07:33:00                                2021 

13:10:00                                Hospital 

Encounter                                           Eamon Simon                                   [facility] 

([facility]                                   1.2.840.114

350.1.13.10

4.2.7.2.686

[phone]

049                       [id number]                  [facility]

 

                                                    2021 

09:00:00                                2021 

10:45:00            Surgery                                 Eamon Simon                                   [facility] 

SPECIALTY 

CARE 

CENTER AT 

[facility]                                   1.2.840.114

350.1.13.10

4.2.7.2.686

[phone]

020                       [id number]                  [facility]

 

                                                    2021 

00:00:00                                2021 

00:00:00                                Orders 

Only                                                Doctor 

Unassigned, 

No Name                                 [facility]                                1.2.840.114

350.1.13.10

4.2.7.2.686

[phone]

009                       [id number]                  [facility]

 

                                                    2021 

00:00:00                                2021 

00:00:00            Kary Steele 

[place]                                  [facility] 

Office 

Building 

One                                     1.2.840.114

350.1.13.10

4.2.7.2.686

[phone]

044                       [id number]                  [facility]

 

                                                    2021 

00:00:00                                2021 

00:00:00                                Patient 

Secure Msg                                          Doctor 

Unassigned, 

No Name                                 [facility]                               1.2.840.114

350.1.13.10

4.2.7.2.686

980.1                     [id number]                  [facility]

 

                                                    2021 

00:00:00                                2021 

00:00:00                                Orders 

Only                                                Doctor 

Unassigned, 

No Name                                 [facility]                                1.2.840.114

350.1.13.10

4.2.7.2.686

[phone]

009                       [id number]                  [facility]

 

                                                    2021 

00:00:00                                2021 

00:00:00                                Patient 

Secure Msg                                          Doctor 

Unassigned, 

No Name                                 [facility] 

SPECIALTY 

CARE 

CENTER AT 

[facility] 

[facility]                                   1.2.840.114

350.1.13.10

4.2.7.2.686

[phone]

072                       [id number]                  [facility]

 

                                                    2021 

00:00:00                                2021 

00:00:00                                Patient 

Secure Msg                                          Doctor 

Unassigned, 

No Name                                 [facility] 

PRIMARY 

CARE 

PAVILLION                               1.2840.114

350.1.13.10

4.2.7.2.686

[phone]

390                       [id number]                  [facility]

 

                                                    2021 

00:00:00                                2021 

00:00:00                                Patient 

Secure Msg                                          Doctor 

Unassigned, 

No Name                                 [facility]                                1.2840.114

350.1.13.10

4.2.7.2.686

[phone]

019                       [id number]                  [facility]

 

                                                    2021 

10:01:38                                2021 

11:01:42                                Office 

Visit                                               Eamon Simon                                   [facility] 

SPECIALTY 

CARE 

CENTER AT 

[facility]CT[facility] 

[facility] [place]                                   1.840.114

350.1.13.10

4.2.7.2.686

[phone]

072                       [id number]                  [facility]

 

                                                    2021 

10:30:00                                2021 

10:30:00            Outpatient          R                   EAMON SIMON           [facility]            [id number]      [facility]

 

                                                    2021 

00:00:00                                2021 

00:00:00            Refill                                  Kary Strauss                                  [facility] 

Office 

Building 

One                                     1.840.114

350.1.13.10

4.2.7.2.686

[phone]

044                       [id number]                  [facility]

 

                                                    2021 

00:00:00                                2021 

00:00:00            Refill                                  Pcp, 

Patient 

Does Not 

Have A                                  [facility]                                1.2840.114

350.1.13.10

4.2.7.2.686

[phone]

044                       [id number]                  [facility]

 

                                                    2021 

00:00:00                                2021 

00:00:00            Kary Steele 

[facility] 

[place]

[facility] hospital 

Office 

Building 

One                                     1.20.114

350.1.13.10

4.2.7.2.686

[phone]

044                       [id number]                  [facility]

 

                                                    2021 

00:00:00                                2021 

00:00:00            Telephone                               Alfred 

[facility] 

SPECIALTY 

CARE 

CENTER AT 

[facility]                                   1.20.114

350.1.13.10

4.2.7.2.686

[phone]

072                       [id number]                  [facility]

 

                                                    2021-05-15 

00:00:00                                2021-05-15 

00:00:00            Telephone                               Simon 

[facility] 

CARE 

[place] AT 

[facility]                                   1.2840.114

350.1.13.10

4.2.7.2.686

[phone]

072                       [phone]                  [facility]

 

                                                    2021 

11:25:00                                2021 

15:03:00                                Hospital 

Encounter                                           Eamon Simon                                   [facility]

ICAL 

SCIENCES 

[facility]                                    1.20.114

350.1.13.10

4.2.7.2.686

[phone]

020                       [id number]                  [facility]

 

                                                    2021 

12:00:00                                2021 

14:06:00            Surgery                                 Eamon Simon                                   [facility]

ICAL 

SCIENCES 

[facility]                                    1.2840.114

350.1.13.10

4.2.7.2.686

[phone]

020                       [id number]                  [facility]

 

                                                    2021 

00:00:00                                2021 

00:00:00                                Orders 

Only                                                Doctor 

Unassigned, 

No Name                                 [facility]                                1.2840.114

350.1.13.10

4.2.7.2.686

[phone]

009                       [id number]                  [facility]

 

                                                    2021 

00:00:00                                2021 

00:00:00                                Patient 

Secure Msg                                          Doctor 

Unassigned, 

No Name                                 [facility]

ICAL 

SCIENCES 

[facility]                                    1.2.114

350.1.13.10

4.2.7.2.686

[phone]

020                       [id number]                  [facility]

 

                                                    2021 

10:46:44                                2021 

11:01:44                                Technician 

Visit                                               Vls-Lab

Alfred 

[facility] 

SPECIALTY 

CARE 

[place] AT 

[facility]                                   1.20.114

350.1.13.10

4.2.7.2.686

[phone]

353                       [id number]                  [facility]

 

                                                    2021 

10:34:15                                2021 

10:49:15                                Laboratory 

Only                                                Only, Lcc 

Test

[facility] AT 

[facility]                                   1.2.114

350.1.13.10

4.2.7.2.686

[phone]

353                       [phone]                  [facility]

 

                                                    2021 

10:45:00                                2021 

10:45:00            Outpatient          R                   SIMON 

[facility]            [id number]      [facility]

 

                                                    2021 

09:43:35                                2021 

09:58:35                                Technician 

Visit                                               Pcp-Lab

Reno Patricia                                   [facility] 

PRIMARY 

CARE 

[place]                               1..114

350.1.13.10

4.2.7.2.686

[phone]

366                       [phone]                  [facility]

 

                                                    2021 

00:00:00                                2021 

00:00:00                                Patient 

Secure Msg                                          Doctor 

Unassigned, 

No Name                                 [facility] AT 

[place]                               1.2.114

350.1.13.10

4.2.7.2.686

[id number]                     [id number]                  [facility]

 

                                                    2021 

00:00:00                                2021 

00:00:00                                Patient 

Secure Msg                                          Doctor 

Unassigned, 

No Name                                 [facility] 

[employer] 

BANK 

BL[facility].                                   1.2.114

350.1.13.10

4.2.7.2.686

[phone]

136                       [id number]                  [facility]

 

                                                    2021 

00:00:00                                2021 

00:00:00            Salty Strauss 

[facility] 

Office 

Building 

One                                     1.2.840.114

350.1.13.10

4.2.7.2.686

[phone]

044                       [id number]                  [facility]

 

                                                    2021 

00:00:00                                2021 

00:00:00            Salty Strauss 

[facility] 

Office 

Building 

One                                     1.2840.114

350.1.13.10

4.2.7.2.686

[phone]

044                       [id number]                  [facility]

 

                                                    2021 

08:31:02                                2021 

12:29:21                                Office 

Visit                                               Alfred 

Eamon                                   [facility] AT 

[facility]                                   1.2.840.114

350.1.13.10

4.2.7.2.686

[phone]

072                       [id number]                  [facility]

 

                                                    2021 

10:02:17                                2021 

10:08:16                                Technician 

Visit                                               Vls-Lab

[facility] 

[facility] AT 

[facility]                                   1.2.840.114

350.1.13.10

4.2.7.2.686

[id number]

353                       [id number]                  [facility]

 

                                                    2021 

09:00:00                                2021 

09:00:00            Outpatient          R                   ALFRED 

EAMON           [facility]            [id number]      [facility]

 

                                                    2021 

13:40:00                                2021 

23:59:00                                Hospital 

Encounter                                           Reno Patricia                                   [facility]                                 1.2840.114

350.1.13.10

4.2.7.2.686

[phone]

801                       [id number]                  [facility]

 

                                                    2021 

00:00:00                                2021 

00:00:00            Outpatient          RENO OROZCO             [facility]            [id number]      [facility]

 

                                                    2021 

10:30:00                                2021 

10:30:00            Outpatient          KARY BANKS           [facility]            [id number]      [facility]

 

                                                    2021 

15:30:00                                2021 

15:30:00            Outpatient          LETICIA JACOBSEN          [facility]            [id number]      [facility]

 

                                                    2021 

00:00:00                                2021 

00:00:00                                Patient 

Outreach                                            Anais Macegalindo [address]                                   1.840.114

350.1.13.10

4.2.7.2.686

[phone]

403                       [id number]                  [facility]

 

                                                    2021 

00:00:00                                2021 

00:00:00            Telephone                               Tita Rodriguez                              [facility] 

PRIMARY 

CARE 

[place]                               1.840.114

350.1.13.10

4.2.7.2.686

[phone]

389                       [phone]                  [facility]

 

                                                    2021 

00:00:00                                2021 

00:00:00                                Patient 

Secure Eamon Kelly                                   [facility] 

SPECIALTY 

CARE 

CENTER AT 

[facility]                                   1.840.114

350.1.13.10

4.2.7.2.686

[phone]

072                       [id number]                  [facility]

 

                                                    2021 

00:00:00                                2021 

00:00:00                                Patient 

Outreach                                            Anais Mace                                       Kavya Ennis                                   1.840.114

350.1.13.10

4.2.7.2.686

[phone]

403                       [id number]                  [facility]

 

                                                    2021 

00:00:00                                2021 

00:00:00            Telephone                               Tita Rodriguez                              [facility] 

PRIMARY 

CARE 

[place]                               1.840.114

350.1.13.10

4.2.7.2.686

[phone]

389                       [id number]                  [facility]

 

                                                    2021 

00:00:00                                2021 

00:00:00            Telephone                               Charly

felixTita                              [facility] 

PRIMARY 

CARE 

[place]                               1.20.114

350.1.13.10

4.2.7.2.686

[phone]

389                       [id number]                  [facility]

 

                                                    2021 

15:14:29                                2021 

15:29:29                                Technician 

Visit                                               Pcp-Lab

Unknown, 

Attending                               [facility] 

PRIMARY 

CARE 

[place]                               1..114

350.1.13.10

4.2.7.2.686

[phone]

366                       [id number]                  [facility]

 

                                                    2021 

13:31:11                                2021 

14:01:11                                Office 

Visit                                               Tita Rodriguez Rex M                                   [facility] 

PRIMARY 

CARE 

PA[place]                               1..114

350.1.13.10

4.2.7.2.686

[phone]

389                       [id number]                  [facility]

 

                                                    2021 

11:40:00                                2021 

11:40:00            Outpatient          RENO OROZCO             [facility]            [id number]      [facility]

 

                                                    2021 

00:00:00                                2021 

00:00:00            Kary Steele [facility] by [facility] 

Office 

Building 

One                                     .84.114

350.1.13.10

4.2.7.2.686

[phone]

044                       [phone]                  [facility]

 

                                                    2021 

00:00:00                                2021 

00:00:00                                Patient 

Outreach                                            Anais Mace                                   1..114

350.1.13.10

4.2.7.2.686

[phone]

403                       [id number]                  [facility]

 

                                                    2021 

00:00:00                                2021 

00:00:00            Telephone                               Steve Castillo                             [facility] 

[facility] 

Building                                1.2.840.114

350.1.13.10

4.2.7.2.686

[phone]

092                       [phone]                  [facility]

 

                                                    2021 

10:04:49                                2021 

11:12:53                                Office 

Visit                                               Steve Castillo                             [facility] 

Building                                1.2.840.114

350.1.13.10

4.2.7.2.686

[phone]

092                       [id number]                  [facility]

 

                                                    2021 

10:00:00                                2021 

11:12:53            Outpatient          R                   STEVE CASTILLO HOWARD          [facility]            [id number]      [facility]

 

                                                    2021 

10:00:00                                2021 

11:12:53            Outpatient          STEVE HUMPHREYS HOWARD          [facility]            [id number]      [facility]

 

                                                    2021 

13:45:00                                2021 

13:45:00            Outpatient          KARY BANKS           [facility]            [id number]      [facility]

 

                                                    2021 

13:00:56                                2021 

13:30:56                                Office 

Visit                                               ArtrohiniKary Ednahum                                  [facility] 

Office 

Building 

One                                     1.2.840.114

350.1.13.10

4.2.7.2.686

[phone]

044                       [id number]                  [facility]

 

                                                    2021 

13:50:00                                2021 

13:50:00  Outpatient                     [facility]      [id number] [facility]

 

                                                    2021 

18:04:00                                2021 

00:48:00            Emergency                               PERFECTO Mchugh                                   [facility]                                  1.2.840.114

350.1.13.10

4.2.7.2.686

[phone]

084                       [id number]                  [facility]

 

                                                    2021 

18:04:00                                2021 

00:48:00  Emergency X         PERFECTO MCHUGH [facility]      ERT       [id number] [facility]

 

                                                    2021 

00:00:00                                2021 

00:00:00                                Patient 

Outreach                                            Anais Mace                                   1.114

350.1.13.10

4.2.7.2.686

[phone]

403                       [id number]                  [facility]

 

                                                    2021 

13:00:00                                2021 

13:00:00            Outpatient          EAMON LUND           [facility]            [id number]      [facility]

 

                                                    2021 

00:00:00                                2021 

00:00:00                                Patient 

Secure Msg                                          Alfred 

[facility] 

SPECIALTY 

CARE 

CENTER AT 

[facility]                                   1.114

350.1.13.10

4.2.7.2.686

[phone]

072                       [id number]                  [facility]

 

                                                    2021 

00:00:00                                2021 

00:00:00                                Patient 

Secure Msg                                          Carlos A 

[facility] 

Office 

Building 

One                                     .114

350.1.13.10

4.2.7.2.686

[phone]

044                       [id number]                  [facility]

 

                                                    2021 

00:00:00                                2021 

00:00:00                                Patient 

Secure Msg                                          Doctor 

Unassigned, 

No Name                                 [facility]                                .114

350.1.13.10

4.2.7.2.686

[phone]

019                       [phone]                  [facility]

 

                                                    2021 

13:50:32                                2021 

14:20:32                                Office 

Visit                                               Carlos A 

[facility] 

Office 

Building 

One                                     .114

350.1.13.10

4.2.7.2.686

[phone]

044                       [id number]                  [facility]

 

                                                    2021 

14:00:00                                2021 

14:00:00            Outpatient          KARY BANKS           [facility]            [id number]      [facility]

 

                                                    2021 

00:00:00                                2021 

00:00:00                                Patient 

Outreach                                            Anais Mace                                   1..114

350.1.13.10

4.2.7.2.686

[phone]

403                       [id number]                  [facility]

 

                                                    2021 

00:00:00                                2021 

00:00:00                                Patient 

Outreach                                            Anais Mace                                   1.0.114

350.1.13.10

4.2.7.2.686

[phone]

403                       [id number]                  [facility]

 

                                                    2021 

00:00:00                                2021 

00:00:00                                Transition 

of Care                                             LorenzHortencia kongderek Matthewsza                                   1..114

350.1.13.10

4.2.7.2.686

[phone]

403                       [id number]                  [facility]

 

                                                    2021 

19:21:00                                2021 

16:05:00                                Hospital 

Encounter                                           Amrit Lopez, 

Amber Fernandez                                   [facility]                                ..114

350.1.13.10

4.2.7.2.686

[phone]

093                       [id number]                  [facility]

 

                                                    2021 

19:21:00                                2021 

16:05:00            Inpatient           X                   AMBER CHILDS           [facility]             [id number]      [facility]

 

                                                    2021-02-10 

00:00:00                                2021-02-10 

00:00:00                                Patient 

Outreach                                            Anais Mace 

[place]                                   1..114

350.1.13.10

4.2.7.2.686

[phone]

403                       [id number]                  [facility]

 

                                                    2021 

00:00:00                                2021 

00:00:00            Telephone                               [facility] 

[facility]                                     1.2.840.114

350.1.13.10

4.2.7.2.686

[phone]

044                       [id number]                  [facility]

 

                                                    2021 

00:00:00                                2021 

00:00:00            Telephone                               Kary Strauss 

[facility] 

Office 

Building 

One                                     1.2840.114

350.1.13.10

4.2.7.2.686

[phone]

044                       [id number]                  [facility]

 

                                                    2021 

00:00:00                                2021 

00:00:00            Telephone                               Kary Strauss 

[facility] 

Office 

Building 

One                                     1.840.114

350.1.13.10

4.2.7.2.686

[phone]

044                       [id number]                  [facility]

 

                                                    2021 

15:05:56                                2021 

15:35:56                                Office 

Visit                                               Carlos A 

[facility] 

Office 

Building 

One                                     1.0.114

350.1.13.10

4.2.7.2.686

[phone]

044                       [id number]                  [facility]

 

                                                    2021 

15:00:00                                2021 

15:00:00            Outpatient          R                   CARLOS A 

[facility]            [id number]      [facility]

 

                                                    2021 

00:00:00                                2021 

00:00:00                                Orders 

Only                                                Doctor 

Unassigned, 

No Name                                 [facility]                                1.20.114

350.1.13.10

4.2.7.2.686

[phone]

009                       [id number]                  [facility]

 

                                                    2021 

00:00:00                                2021 

00:00:00                                Patient 

Outreach                                            Anais Mace                                   1.840.114

350.1.13.10

4.2.7.2.686

[phone]

403                       [id number]                  [facility]

 

                                                    2021 

00:00:00                                2021 

00:00:00            Telephone                               Andriy 

Enrique 

[facility]                                1.2.840.114

350.1.13.10

4.2.7.2.686

[phone]

025                       [id number]                  [facility]

 

                                                    2021 

00:00:00                                2021 

00:00:00            Telephone                               [facility] 

PRIMARY 

CARE 

PAVILLION                               1.2.840.114

350.1.13.10

4.2.7.2.686

[phone]

388                       [phone]                  [facility]

 

                                                    2021 

00:00:00                                2021 

00:00:00                                Transition 

of Care                                             France Alvarez 

[place]                                   [id number]

[id number]

[id number]

[phone]

403                       [id number]                  [facility]

 

                                                    2021 

00:00:00                                2021 

00:00:00            Telephone                               Ana Maria Rajput                                   [facility] 

PRIMARY 

CARE 

[place]                               [id number]

[id number]

[id number]

[phone]

388                       [id number]                  [facility]

 

                                                    2020 

12:52:00                                2021 

16:14:00                                Hospital 

Encounter                                           Amrit Lopez Gulshan Qureshi, 

[facility]                                [id number]

[id number]

[id number]

[phone]

095                       [id number]                  [facility]

 

                                                    2020 

12:52:00                                2021 

16:14:00            [facility]           [facility]                   [facility]             [id number]      [facility]

 

                                                    2021 

00:00:00                                2021 

00:00:00                                Case 

Management                                          Terrence Frazier                                 [facility]                                 [id number]

[id number]

[id number]

[phone]

071                       [id number]                  [facility]







Results





                    Test Description Test Time Test Comments Results   Result Co

mments Source







                                        

 

                                        

 

                                        

 

                                        





[facility] HEMOGLOBIN A1C [id number] 18:58:00* 



                      Test Item  Value      Reference Range Interpretation Comme

nts

 

                      POCT HBA1C (test code = 4548-4) 7.0 %      4-6        A   

       

 

                                                    Lab Interpretation (test cod

e = 

69245-3)        Abnormal                                        





[facility] HEMOGLOBIN A1C [id number] 18:58:00* 



                      Test Item  Value      Reference Range Interpretation Comme

nts

 

                      POCT HBA1C (test code = 4548-4) 7.0 %      4-6        A   

       

 

                                                    Lab Interpretation (test cod

e = 

55595-2)        Abnormal                                        





[facility] HEMOGLOBIN A1C [id number] 21:15:00* 



                      Test Item  Value      Reference Range Interpretation Comme

nts

 

                      POCT HBA1C (test code = 4548-4) 7.7 %      4-6        A   

       

 

                                                    Lab Interpretation (test cod

e = 

25801-0)        Abnormal                                        





[facility] HEMOGLOBIN A1C [id number] 21:15:00* 



                      Test Item  Value      Reference Range Interpretation Comme

nts

 

                      POCT HBA1C (test code = 4548-4) 7.7 %      4-6        A   

       

 

                                                    Lab Interpretation (test cod

e = 

09903-2)        Abnormal                                        





[facility] HEMOGLOBIN A1C [id number] 21:15:00* 



                      Test Item  Value      Reference Range Interpretation Comme

nts

 

                      POCT HBA1C (test code = 4548-4) 7.7 %      4-6        A   

       

 

                                                    Lab Interpretation (test cod

e = 

28361-0)        Abnormal                                        





[facility] 
[facility]* 



                      Test Item  Value      Reference Range Interpretation Comme

nts

 

                                                    NA (test code = 

7286467947)     138 mmol/L      135-145                         

 

                                                    K (test code = 

4457152005)     4.2 mmol/L      3.5-5.0                         

 

                                                    CL (test code = 

5022048694)     104 mmol/L                                

 

                                                    CO2 TOTAL (test code = 

2728426566)     24 mmol/L       23-31                           

 

                                                    AGAP (test code = 

0831824749)     10              2-16                            

 

                                                    BUN (test code = 

6459325617)     9 mg/dL         7-23                            

 

                                                    GLUCOSE (test code = 

1493861974)     266 mg/dL                 H               

 

                                                    CREATININE (test code = 

7276527071)     0.87 mg/dL      0.60-1.25                       

 

                                                    TOTAL BILI (test code = 

1560302352)     0.4 mg/dL       0.1-1.1                         

 

                                                    CALCIUM (test code = 

0496087182)     9.4 mg/dL       8.6-10.6                        

 

                                                    T PROTEIN (test code = 

7328607178)     7.4 g/dL        6.3-8.2                         

 

                                                    ALBUMIN (test code = 

5644786392)     4.6 g/dL        3.5-5.0                         

 

                                                    ALK PHOS (test code = 

6837167695)     54 U/L                                    

 

                                                    ALTv (test code = 

1742-6)         19 U/L          5-50                            

 

                                                    AST(SGOT) (test code = 

4141935219)     21 U/L          13-40                           

 

                                                    eGFR (test code = 

7623218722)     99.9            mL/min/1.73m2                   

 

                                        ARVIND (test code = ARVIND) Association of 

Glomerular Filtration 

Rate (GFR) and Staging 

of Kidney Disease* 

+---------------------

--+-------------------

--+-------------------

------+| GFR 

(mL/min/1.73 m2) ?| 

With Kidney Damage ?| 

?Without Kidney 

Damage+---------------

--------+-------------

--------+-------------

------------+| ?>90 ? 

? ? ? ? ? ? ? ?| 

?Stage one ? ? ? ? ?| 

? Normal ? ? ? ? ? ? ? 

?+--------------------

---+------------------

---+------------------

-------+| ?60-89 ? ? ? 

? ? ? ? ?| ?Stage two 

? ? ? ? ?| ? Decreased 

GFR ? ? ? ? 

+---------------------

--+-------------------

--+-------------------

------+| ?30-59 ? ? ? 

? ? ? ? ?| ?Stage 

three ? ? ? ?| ? Stage 

three ? ? ? ? ? 

+---------------------

--+-------------------

--+-------------------

------+| ?15-29 ? ? ? 

? ? ? ? ?| ?Stage four 

? ? ? ? | ? Stage four 

? ? ? ? ? 

?+--------------------

---+------------------

---+------------------

-------+| ?<15 (or 

dialysis) ? ?| ?Stage 

five ? ? ? ? | ? Stage 

five ? ? ? ? ? 

?+--------------------

---+------------------

---+------------------

-------+ *Each stage 

assumes the associated 

GFR level has been in 

effect for at least 

three months. ?Stages 

1 to 5, with or 

without kidney 

disease, indicate 

chronic kidney 

disease. Notes: 

Determination of 

stages one and two 

(with eGFR 

>59mL/min/1.73 m2) 

requires estimation of 

kidney damage for at 

least three months as 

defined by structural 

or functional 

abnormalities of the 

kidney, manifested by 

either:Pathological 

abnormalities or 

Markers of kidney 

damage (including 

abnormalities in the 

composition of the 

blood or urine or 

abnormalities in 

imaging tests).                                             

 

                                                    Lab Interpretation 

(test code = 19424-0) Abnormal                                        





[id number] 



                                Test Item       Value           Reference 

Range                     Interpretation            Comments

 

                                                    D-DIMER (test code = 

7553705682)                     See_Comment                     [Automated 

message] The 

system which 

generated this 

result 

transmitted 

reference range: 

<0.41 ?g/mL 

(FEU). The 

reference range 

was not used to 

interpret this 

result as 

normal/abnormal.

 

                                                    ARVIND (test code = 

ARVIND)                                    This test may be 

used in conjunction 

with a clinical 

pretest probability 

(PTP) assessment 

model to exclude 

venous 

thromboembolism 

(VTE) in patients 

suspected of deep 

venous thrombosis 

(DVT) and pulmonary 

embolism (PE) A 

D-Dimer value less 

than 0.50 ?g/ml 

(FEU) has a negative 

predicative value of 

96 to 100% (95% 

CI)and 97 to 100% 

(95% CI) as an aid 

in the diagnosis of 

deep vein thrombosis 

(DVT) and pulmonary 

embolism when there 

is low or moderate 

pretest probability 

of PE or DVT. 

D-Dimer values are 

expressed in initial 

fibrinogen 

equivalent units 

(FEU)" The assay 

results should be 

used with other 

information, 

including the 

clinical context, in 

forming a diagnosis.                                         

 

                                                    Lab Interpretation 

(test code = 

22984-4)        Normal                                          





[id number] 



                      Test Item  Value      Reference Range Interpretation Comme

nts

 

                                                    TROPONIN I (test code = 

2068954005)     0.002 ng/mL     <=0.034                         

 

                                        ARVIND (test code = ARVIND) Reference (Normal)

 

Range (defined by the 

99th percentile 

reference limit): <= 

0.034 ng/mL Note: 

Cardiac troponin 

begins to rise 3-4 

hours after the onset 

of ischemia. Repeat in 

4-6 hours if the 

sample was drawn 

within 3-4 hours of 

the onset of the 

symptom and found 

normal. Diagnosis of 

myocardial injury is 

made with acute 

changes in cTn 

concentrations with at 

least one serial 

sample above the 99th 

percentile upper 

reference limit (URL), 

taken together with 

the patient's clinical 

presentation. Biotin 

has been reported to 

cause a negative bias, 

interpret results 

relative to patient's 

use of biotin.                                              

 

                                                    Lab Interpretation 

(test code = 33054-9) Normal                                          





[facility] [id number] [facility]:00:16* 



                      Test Item  Value      Reference Range Interpretation Comme

nts

 

                                                    WBC (test code = 

6690-2)         6.93            See_Comment                     [Automated messa

ge] 

The system which 

generated this 

result transmitted 

reference range: 

4.20 - 10.70 

10*3/?L. The 

reference range was 

not used to 

interpret this 

result as 

normal/abnormal.

 

                                                    RBC (test code = 

789-8)          4.09            See_Comment     L               [Automated messa

ge] 

The system which 

generated this 

result transmitted 

reference range: 

4.26 - 5.52 

10*6/?L. The 

reference range was 

not used to 

interpret this 

result as 

normal/abnormal.

 

                                                    HGB (test code = 

718-7)          12.2 g/dL       12.2-16.4                       

 

                                                    HCT (test code = 

4544-3)         35.0 %          38.4-49.3       L               

 

                                                    MCV (test code = 

787-2)          85.6 fL         81.7-95.6                       

 

                                                    MCH (test code = 

785-6)          29.8 pg         26.1-32.7                       

 

                                                    MCHC (test code = 

786-4)          34.9 g/dL       31.2-35.0                       

 

                                                    RDW-SD (test code = 

16337-3)        36.5 fL         38.5-51.6       L               

 

                                                    RDW-CV (test code = 

788-0)          11.9 %          12.1-15.4       L               

 

                                                    PLT (test code = 

777-3)          211             See_Comment                     [Automated messa

ge] 

The system which 

generated this 

result transmitted 

reference range: 

150 - 328 10*3/?L. 

The reference range 

was not used to 

interpret this 

result as 

normal/abnormal.

 

                                                    MPV (test code = 

97214-5)        9.9 fL          9.8-13.0                        

 

                                                    NRBC/100 WBC (test 

code = 2443684732) 0.0             See_Comment                     [Automated me

ssage] 

The system which 

generated this 

result transmitted 

reference range: 

0.0 - 10.0 /100 

WBCs. The reference 

range was not used 

to interpret this 

result as 

normal/abnormal.

 

                                                    NRBC x10^3 (test code 

= 9680317583)                   See_Comment                     [Automated messa

ge] 

The system which 

generated this 

result transmitted 

reference range: 

10*3/?L. The 

reference range was 

not used to 

interpret this 

result as 

normal/abnormal.

 

                                                    GRAN MAT (NEUT) % 

(test code = 770-8) 49.6 %                                          

 

                                                    IMM GRAN % (test code 

= 2585414059)   0.10 %                                          

 

                                                    LYMPH % (test code = 

736-9)          41.1 %                                          

 

                                                    MONO % (test code = 

5905-5)         7.2 %                                           

 

                                                    EOS % (test code = 

713-8)          1.6 %                                           

 

                                                    BASO % (test code = 

706-2)          0.4 %                                           

 

                                                    GRAN MAT x10^3(ANC) 

(test code = 

4431040423)     3.43 10*3/uL    1.99-6.95                       

 

                                                    IMM GRAN x10^3 (test 

code = 5658170491)                 0.00-0.06                       

 

                                                    LYMPH x10^3 (test code 

= 731-0)        2.85 10*3/uL    1.09-3.23                       

 

                                                    MONO x10^3 (test code 

= 742-7)        0.50 10*3/uL    0.36-1.02                       

 

                                                    EOS x10^3 (test code = 

711-2)          0.11 10*3/uL    0.06-0.53                       

 

                                                    BASO x10^3 (test code 

= 704-7)        0.03 10*3/uL    0.01-0.09                       

 

                                                    Lab Interpretation 

(test code = 11478-7) Abnormal                                        





[facility]* 



                      Test Item  Value      Reference Range Interpretation Comme

[facility]

 

                      POCT GLU (test code = 4928733535) 315 mg/dL       H 

         

 

                                                    Lab Interpretation (test cod

e = 

03244-4)        Abnormal                                        





[facility] WITH [id number] 04:39:33* 



                      Test Item  Value      Reference Range Interpretation Comme

nts

 

                                                    WBC (test code = 

6690-2)         10.53           See_Comment                     [Automated messa

ge] 

The system which 

generated this 

result transmitted 

reference range: 

4.20 - 10.70 

10*3/?L. The 

reference range was 

not used to 

interpret this 

result as 

normal/abnormal.

 

                                                    RBC (test code = 

789-8)          3.93            See_Comment     L               [Automated messa

ge] 

The system which 

generated this 

result transmitted 

reference range: 

4.26 - 5.52 

10*6/?L. The 

reference range was 

not used to 

interpret this 

result as 

normal/abnormal.

 

                                                    HGB (test code = 

718-7)          11.7 g/dL       12.2-16.4       L               

 

                                                    HCT (test code = 

4544-3)         34.1 %          38.4-49.3       L               

 

                                                    MCV (test code = 

787-2)          86.8 fL         81.7-95.6                       

 

                                                    MCH (test code = 

785-6)          29.8 pg         26.1-32.7                       

 

                                                    MCHC (test code = 

786-4)          34.3 g/dL       31.2-35.0                       

 

                                                    RDW-SD (test code = 

80445-9)        41.1 fL         38.5-51.6                       

 

                                                    RDW-CV (test code = 

788-0)          13.1 %          12.1-15.4                       

 

                                                    PLT (test code = 

777-3)          203             See_Comment                     [Automated messa

ge] 

The system which 

generated this 

result transmitted 

reference range: 

150 - 328 10*3/?L. 

The reference range 

was not used to 

interpret this 

result as 

normal/abnormal.

 

                                                    MPV (test code = 

21445-9)        10.1 fL         9.8-13.0                        

 

                                                    NRBC/100 WBC (test 

code = 8534266866) 0.0             See_Comment                     [Automated me

ssage] 

The system which 

generated this 

result transmitted 

reference range: 

0.0 - 10.0 /100 

WBCs. The reference 

range was not used 

to interpret this 

result as 

normal/abnormal.

 

                                                    NRBC x10^3 (test code 

= 0889953442)                   See_Comment                     [Automated messa

ge] 

The system which 

generated this 

result transmitted 

reference range: 

10*3/?L. The 

reference range was 

not used to 

interpret this 

result as 

normal/abnormal.

 

                                                    GRAN MAT (NEUT) % 

(test code = 770-8) 64.4 %                                          

 

                                                    IMM GRAN % (test code 

= 1183970258)   0.30 %                                          

 

                                                    LYMPH % (test code = 

736-9)          27.1 %                                          

 

                                                    MONO % (test code = 

5905-5)         6.7 %                                           

 

                                                    EOS % (test code = 

713-8)          1.1 %                                           

 

                                                    BASO % (test code = 

706-2)          0.4 %                                           

 

                                                    GRAN MAT x10^3(ANC) 

(test code = 

8646397506)     6.78 10*3/uL    1.99-6.95                       

 

                                                    IMM GRAN x10^3 (test 

code = 1129148102) 0.03 10*3/uL    0.00-0.06                       

 

                                                    LYMPH x10^3 (test code 

= 731-0)        2.85 10*3/uL    1.09-3.23                       

 

                                                    MONO x10^3 (test code 

= 742-7)        0.71 10*3/uL    0.36-1.02                       

 

                                                    EOS x10^3 (test code = 

711-2)          0.12 10*3/uL    0.06-0.53                       

 

                                                    BASO x10^3 (test code 

= 704-7)        0.04 10*3/uL    0.01-0.09                       

 

                                                    Lab Interpretation 

(test code = 77446-2) Abnormal                                        





[facility]38752[facility] 
04:09:12* 



                      Test Item  Value      Reference Range Interpretation Comme

nts

 

                                                    NA (test code = 

0272146383)     137 mmol/L      135-145                         

 

                                                    K (test code = 

0476713308)     4.6 mmol/L      3.5-5.0                         

 

                                                    CL (test code = 

0538635577)     100 mmol/L                                

 

                                                    CO2 TOTAL (test code = 

3859168994)     27 mmol/L       23-31                           

 

                                                    AGAP (test code = 

6612619690)     10              2-16                            

 

                                                    BUN (test code = 

1504070592)     21 mg/dL        7-23                            

 

                                                    GLUCOSE (test code = 

1992620340)     220 mg/dL                 H               

 

                                                    CREATININE (test code = 

8455366651)     0.89 mg/dL      0.60-1.25                       

 

                                                    TOTAL BILI (test code = 

3618077371)     0.6 mg/dL       0.1-1.1                         

 

                                                    CALCIUM (test code = 

6161203080)     10.0 mg/dL      8.6-10.6                        

 

                                                    T PROTEIN (test code = 

4134045829)     7.5 g/dL        6.3-8.2                         

 

                                                    ALBUMIN (test code = 

7465867871)     4.9 g/dL        3.5-5.0                         

 

                                                    ALK PHOS (test code = 

5056615819)     31 U/L                    L               

 

                                                    ALTv (test code = 

1742-6)         19 U/L          5-50                            

 

                                                    AST(SGOT) (test code = 

0237449510)     27 U/L          13-40                           

 

                                                    eGFR (test code = 

8564661025)     97.3            mL/min/1.73m2                   

 

                                        ARVIND (test code = ARVIND) Association of 

Glomerular Filtration 

Rate (GFR) and Staging 

of Kidney Disease* 

+---------------------

--+-------------------

--+-------------------

------+| GFR 

(mL/min/1.73 m2) ?| 

With Kidney Damage ?| 

?Without Kidney 

Damage+---------------

--------+-------------

--------+-------------

------------+| ?>90 ? 

? ? ? ? ? ? ? ?| 

?Stage one ? ? ? ? ?| 

? Normal ? ? ? ? ? ? ? 

?+--------------------

---+------------------

---+------------------

-------+| ?60-89 ? ? ? 

? ? ? ? ?| ?Stage two 

? ? ? ? ?| ? Decreased 

GFR ? ? ? ? 

+---------------------

--+-------------------

--+-------------------

------+| ?30-59 ? ? ? 

? ? ? ? ?| ?Stage 

three ? ? ? ?| ? Stage 

three ? ? ? ? ? 

+---------------------

--+-------------------

--+-------------------

------+| ?15-29 ? ? ? 

? ? ? ? ?| ?Stage four 

? ? ? ? | ? Stage four 

? ? ? ? ? 

?+--------------------

---+------------------

---+------------------

-------+| ?<15 (or 

dialysis) ? ?| ?Stage 

five ? ? ? ? | ? Stage 

five ? ? ? ? ? 

?+--------------------

---+------------------

---+------------------

-------+ *Each stage 

assumes the associated 

GFR level has been in 

effect for at least 

three months. ?Stages 

1 to 5, with or 

without kidney 

disease, indicate 

chronic kidney 

disease. Notes: 

Determination of 

stages one and two 

(with eGFR 

>59mL/min/1.73 m2) 

requires estimation of 

kidney damage for at 

least three months as 

defined by structural 

or functional 

abnormalities of the 

kidney, manifested by 

either:Pathological 

abnormalities or 

Markers of kidney 

damage (including 

abnormalities in the 

composition of the 

blood or urine or 

abnormalities in 

imaging tests).                                             

 

                                                    Lab Interpretation 

(test code = 73982-3) Abnormal                                        





[facility] 



                      Test Item  Value      Reference Range Interpretation Comme

nts

 

                      LIPASE (test code = 8131819891) 44 U/L     0-220          

       

 

                                                    Lab Interpretation (test cod

e = 

53528-4)        Normal                                          





[id number]01[facility]22* 



                      Test Item  Value      Reference Range Interpretation Comme

nts

 

                                                    WBC (test code = 

6690-2)         7.45            See_Comment                     [Automated [employer] [employer]] 

The system which 

generated this 

result transmitted 

reference range: 

4.20 - 10.70 

10*3/?L. The 

reference range was 

not used to 

interpret this 

result as 

normal/abnormal.

 

                                                    RBC (test code = 

789-8)          4.10            See_Comment     L               [Automated messa

[facility]] 

The system which 

generated this 

result transmitted 

reference range: 

4.26 - 5.52 

10*6/?L. The 

reference range was 

not used to 

interpret this 

result as 

normal/abnormal.

 

                                                    HGB (test code = 

718-7)          11.9 g/dL       12.2-16.4       L               

 

                                                    HCT (test code = 

4544-3)         34.8 %          38.4-49.3       L               

 

                                                    MCV (test code = 

787-2)          84.9 fL         81.7-95.6                       

 

                                                    MCH (test code = 

785-6)          29.0 pg         26.1-32.7                       

 

                                                    MCHC (test code = 

786-4)          34.2 g/dL       31.2-35.0                       

 

                                                    RDW-SD (test code = 

72676-4)        37.0 fL         38.5-51.6       L               

 

                                                    RDW-CV (test code = 

788-0)          12.2 %          12.1-15.4                       

 

                                                    PLT (test code = 

777-3)          204             See_Comment                     [Automated messa

ge] 

The system which 

generated this 

result transmitted 

reference range: 

150 - 328 10*3/?L. 

The reference range 

was not used to 

interpret this 

result as 

normal/abnormal.

 

                                                    MPV (test code = 

83365-4)        9.7 fL          9.8-13.0        L               

 

                                                    NRBC/100 WBC (test 

code = 7955385067) 0.0             See_Comment                     [Automated me

ssage] 

The system which 

generated this 

result transmitted 

reference range: 

0.0 - 10.0 /100 

WBCs. The reference 

range was not used 

to interpret this 

result as 

normal/abnormal.

 

                                                    NRBC x10^3 (test code 

= 7543628821)                   See_Comment                     [Automated messa

ge] 

The system which 

generated this 

result transmitted 

reference range: 

10*3/?L. The 

reference range was 

not used to 

interpret this 

result as 

normal/abnormal.

 

                                                    GRAN MAT (NEUT) % 

(test code = 770-8) 44.7 %                                          

 

                                                    IMM GRAN % (test code 

= 0389836891)   0.30 %                                          

 

                                                    LYMPH % (test code = 

736-9)          45.9 %                                          

 

                                                    MONO % (test code = 

5905-5)         7.1 %                                           

 

                                                    EOS % (test code = 

713-8)          1.6 %                                           

 

                                                    BASO % (test code = 

706-2)          0.4 %                                           

 

                                                    GRAN MAT x10^3(ANC) 

(test code = 

6699145794)     3.33 10*3/uL    1.99-6.95                       

 

                                                    IMM GRAN x10^3 (test 

code = 8480760090)                 0.00-0.06                       

 

                                                    LYMPH x10^3 (test code 

= 731-0)        3.42 10*3/uL    1.09-3.23       H               

 

                                                    MONO x10^3 (test code 

= 742-7)        0.53 10*3/uL    0.36-1.02                       

 

                                                    EOS x10^3 (test code = 

711-2)          0.12 10*3/uL    0.06-0.53                       

 

                                                    BASO x10^3 (test code 

= 704-7)        0.03 10*3/uL    0.01-0.09                       

 

                                                    Lab Interpretation 

(test code = 59453-6) Abnormal                                        





[facility] 
[facility]:51:41* 



                      Test Item  Value      Reference Range Interpretation Comme

nts

 

                                                    NA (test code = 

2425883935)     137 mmol/L      135-145                         

 

                                                    K (test code = 

8472047957)     4.0 mmol/L      3.5-5.0                         

 

                                                    CL (test code = 

7291721999)     104 mmol/L                                

 

                                                    CO2 TOTAL (test code = 

8283504811)     27 mmol/L       23-31                           

 

                                                    AGAP (test code = 

3616991865)     6               2-16                            

 

                                                    BUN (test code = 

0856160995)     16 mg/dL        7-23                            

 

                                                    GLUCOSE (test code = 

1026754343)     99 mg/dL                                  

 

                                                    CREATININE (test code = 

0945879905)     0.74 mg/dL      0.60-1.25                       

 

                                                    TOTAL BILI (test code = 

6417282878)     0.6 mg/dL       0.1-1.1                         

 

                                                    CALCIUM (test code = 

3505635675)     9.8 mg/dL       8.6-10.6                        

 

                                                    T PROTEIN (test code = 

4533600715)     7.2 g/dL        6.3-8.2                         

 

                                                    ALBUMIN (test code = 

4645016598)     4.7 g/dL        3.5-5.0                         

 

                                                    ALK PHOS (test code = 

1711094022)     27 U/L                    L               

 

                                                    ALTv (test code = 

1742-6)         18 U/L          5-50                            

 

                                                    AST(SGOT) (test code = 

3289769700)     23 U/L          13-40                           

 

                                                    eGFR (test code = 

0148025987)     120.4           mL/min/1.73m2                   

 

                                        ARVIND (test code = ARVIND) Association of 

Glomerular Filtration 

Rate (GFR) and Staging 

of Kidney Disease* 

+---------------------

--+-------------------

--+-------------------

------+| GFR 

(mL/min/1.73 m2) ?| 

With Kidney Damage ?| 

?Without Kidney 

Damage+---------------

--------+-------------

--------+-------------

------------+| ?>90 ? 

? ? ? ? ? ? ? ?| 

?Stage one ? ? ? ? ?| 

? Normal ? ? ? ? ? ? ? 

?+--------------------

---+------------------

---+------------------

-------+| ?60-89 ? ? ? 

? ? ? ? ?| ?Stage two 

? ? ? ? ?| ? Decreased 

GFR ? ? ? ? 

+---------------------

--+-------------------

--+-------------------

------+| ?30-59 ? ? ? 

? ? ? ? ?| ?Stage 

three ? ? ? ?| ? Stage 

three ? ? ? ? ? 

+---------------------

--+-------------------

--+-------------------

------+| ?15-29 ? ? ? 

? ? ? ? ?| ?Stage four 

? ? ? ? | ? Stage four 

? ? ? ? ? 

?+--------------------

---+------------------

---+------------------

-------+| ?<15 (or 

dialysis) ? ?| ?Stage 

five ? ? ? ? | ? Stage 

five ? ? ? ? ? 

?+--------------------

---+------------------

---+------------------

-------+ *Each stage 

assumes the associated 

GFR level has been in 

effect for at least 

three months. ?Stages 

1 to 5, with or 

without kidney 

disease, indicate 

chronic kidney 

disease. Notes: 

Determination of 

stages one and two 

(with eGFR 

>59mL/min/1.73 m2) 

requires estimation of 

kidney damage for at 

least three months as 

defined by structural 

or functional 

abnormalities of the 

kidney, manifested by 

either:Pathological 

abnormalities or 

Markers of kidney 

damage (including 

abnormalities in the 

composition of the 

blood or urine or 

abnormalities in 

imaging tests).                                             

 

                                                    Lab Interpretation 

(test code = 93268-6) Abnormal                                        





[facility] 



                      Test Item  Value      Reference Range Interpretation Comme

nts

 

                      LIPASE (test code = 8510693402) 35 U/L     0-220          

       

 

                                                    Lab Interpretation (test cod

e = 

78361-1)        Normal                                          





[facility] GLUCOSE (AUTOMATED)2023 06:55:22* 



                      Test Item  Value      Reference Range Interpretation Comme

nts

 

                      POCT GLU (test code = 0139554603) 182 mg/dL       H 

         

 

                                                    Lab Interpretation (test cod

e = 

11800-3)        Abnormal                                        





[facility] GLUCOSE (AUTOMATED)2023 02:46:07* 



                      Test Item  Value      Reference Range Interpretation Comme

nts

 

                      POCT GLU (test code = 4246872943) 227 mg/dL       H 

         

 

                                                    Lab Interpretation (test cod

e = 

19734-4)        Abnormal                                        





[id number] 00:56:40* 



                      Test Item  Value      Reference Range Interpretation Comme

nts

 

                      CK (test code = 4248078337) 73 U/L                  

   

 

                                                    Lab Interpretation (test cod

e = 

83724-2)        Normal                                          





[facility] GLUCOSE (AUTOMATED)2023 00:47:25* 



                      Test Item  Value      Reference Range Interpretation Comme

nts

 

                      POCT GLU (test code = 9728824272) 265 mg/dL       H 

         

 

                                                    Lab Interpretation (test cod

e = 

83849-9)        Abnormal                                        





[facility] 14:15:39* 



                      Test Item  Value      Reference Range Interpretation Comme

nts

 

                      POCT GLU (test code = 4696798813) 151 mg/dL       H 

         

 

                                                    Lab Interpretation (test cod

e = 

11282-6)        Abnormal                                        





[facility] 04:53:17* 



                      Test Item  Value      Reference Range Interpretation Comme

nts

 

                      POCT GLU (test code = 8976311560) 140 mg/dL       H 

         

 

                                                    Lab Interpretation (test cod

e = 

00053-8)        Abnormal                                        





[facility] GLUCOSE(AGE >30DAYS)2023-02-10 04:50:00* 



                      Test Item  Value      Reference Range Interpretation Comme

nts

 

                                                    POCT Glu (age>30days) (test 

code = 

3342)           140 mg/dL                 A               

 

                                                    Lab Interpretation (test cod

e = 

58548-1)        Abnormal                                        





[facility] 01:37:32* 



                      Test Item  Value      Reference Range Interpretation Comme

nts

 

                      POCT GLU (test code = 3058923742) 191 mg/dL       H 

         

 

                                                    Lab Interpretation (test cod

e = 

81518-0)        Abnormal                                        





[facility] >30DAYS[facility]2023-[facility]-10 01:29:00* 



                      Test Item  Value      Reference Range Interpretation Comme

nts

 

                                                    POCT Glu (age>30days) (test 

code = 

3342)           191 mg/dL                 A               

 

                                                    Lab Interpretation (test cod

e = 

06446-5)        Abnormal                                        





[facility] GLUCOSE (AUTOMATED)2023-02-10 00:34:56* 



                      Test Item  Value      Reference Range Interpretation Comme

nts

 

                      POCT GLU (test code = 5813458454) 209 mg/dL       H 

         

 

                                                    Lab Interpretation (test cod

e = 

16992-7)        Abnormal                                        





[facility] GLUCOSE (AUTOMATED)2023 15:31:59* 



                      Test Item  Value      Reference Range Interpretation Comme

nts

 

                      POCT GLU (test code = 2011530887) 119 mg/dL       H 

         

 

                                                    Lab Interpretation (test cod

e = 

25828-8)        Abnormal                                        





[facility] GLUCOSE(AGE >30DAYS)2023 15:31:00* 



                      Test Item  Value      Reference Range Interpretation Comme

nts

 

                                                    POCT Glu (age>30days) (test 

code = 

3342)           119 mg/dL                 A               

 

                                                    Lab Interpretation (test cod

e = 

42643-0)        Abnormal                                        





[facility] GLUCOSE(AGE >30DAYS)2023 15:31:00* 



                      Test Item  Value      Reference Range Interpretation Comme

nts

 

                                                    POCT Glu (age>30days) (test 

code = 

3342)           119 mg/dL                 A               

 

                                                    Lab Interpretation (test cod

e = 

82073-0)        Abnormal                                        





[facility] GLUCOSE (AUTOMATED)2023 23:58:56* 



                      Test Item  Value      Reference Range Interpretation Comme

nts

 

                                                    POCT GLU (test code = 

2761363519)     187 mg/dL                 H               Notified Provide

r

 

                                                    Lab Interpretation (test 

code = 89303-9) Abnormal                                        





[facility] GLUCOSE (AUTOMATED)2023 15:01:27* 



                      Test Item  Value      Reference Range Interpretation Comme

nts

 

                      POCT GLU (test code = 1911630039) 268 mg/dL       H 

         

 

                                                    Lab Interpretation (test cod

e = 

65131-0)        Abnormal                                        





[facility] HEMOGLOBIN A1C [id number] 20:00:00* 



                      Test Item  Value      Reference Range Interpretation Comme

nts

 

                      POCT HBA1C (test code = 4548-4) 6.4 %      4-6        A   

       

 

                                                    Lab Interpretation (test cod

e = 

02118-0)        Abnormal                                        





[facility] HEMOGLOBIN A1C [id number] 20:00:00* 



                      Test Item  Value      Reference Range Interpretation Comme

nts

 

                      POCT HBA1C (test code = 4548-4) 6.4 %      4-6        A   

       

 

                                                    Lab Interpretation (test cod

e = 

59767-7)        Abnormal                                        





[facility] HEMOGLOBIN A1C [id number] 20:00:00* 



                      Test Item  Value      Reference Range Interpretation Comme

[facility]

 

                      POCT HBA1C (test code = 4548-4) 6.4 %      4-6        A   

       

 

                                                    Lab Interpretation (test cod

e = 

32889-9)        Abnormal                                        





[facility] HEMOGLOBIN A1C [id number] 20:00:00* 



                      Test Item  Value      Reference Range Interpretation Comme

[facility]

 

                      POCT HBA1C (test code = 4548-4) 6.4 %      4-6        A   

       

 

                                                    Lab Interpretation (test cod

e = 

57637-2)        Abnormal                                        





[id number] 



                                Test Item       Value           Reference 

Range                     Interpretation            Comments

 

                                                    TROPONIN I (test 

code = 0477322624) 0.003 ng/mL     See_Comment                     [Automated 

message] The 

system which 

generated this 

result 

transmitted 

reference range: 

<=0.034. The 

reference range 

was not used to 

interpret this 

result as 

normal/abnormal.

 

                                                    ARVIND (test code = 

ARVIND)                                    Reference (Normal) 

Range (defined by 

the 99th percentile 

reference limit): <= 

0.034 ng/mL Note: 

Cardiac troponin 

begins to rise 3-4 

hours after the 

onset of ischemia. 

Repeat in 4-6 hours 

if the sample was 

drawn within 3-4 

hours of the onset 

of the symptom and 

found normal. 

Diagnosis of 

myocardial injury is 

made with acute 

changes in cTn 

concentrations with 

at least one serial 

sample above the 

99th percentile 

upper reference 

limit (URL), taken 

together with the 

patient's clinical 

presentation. Biotin 

has been reported to 

cause a negative 

bias, interpret 

results relative to 

patient's use of 

biotin.                                                     

 

                                                    Lab Interpretation 

(test code = 

67425-2)        Normal                                          





[facility]46991[facility]TOTAL CHOLESTEROL, 
TRIGLYCERIDES, HDL)2022 15:24:00* 



                      Test Item  Value      Reference Range Interpretation Comme

[facility]

 

                                                    CHOL (test code = 

1844628318)     167 mg/dL       120-200                         

 

                                                    HDL (test code = 

4493365255)     70 mg/dL        See_Comment                     [Automated [employer]] 

The system which 

generated this 

result transmitted 

reference range: 

>=40. The reference 

range was not used 

to interpret this 

result as 

normal/abnormal.

 

                                                    HDLC RATIO (test code = 

1624131935)                     See_Comment                     [Automated [employer]] 

The system which 

generated this 

result transmitted 

reference range: 

<=5.0. The reference 

range was not used 

to interpret this 

result as 

normal/abnormal.

 

                                                    TRIG (test code = 

6986794106)     57 mg/dL                                  

 

                                                    LDL CHOL (test code = 

75067-7)        86 mg/dL        See_Comment                     [Automated messa

ge] 

The system which 

generated this 

result transmitted 

reference range: 

<=160. The reference 

range was not used 

to interpret this 

result as 

normal/abnormal.

 

                                                    VLDL (test code = 

7492817237)     11 mg/dL        5-60                            

 

                                                    Lab Interpretation (test 

code = 38174-0) Normal                                          





[facility]* 



                      Test Item  Value      Reference Range Interpretation Comme

nts

 

                      MAGNESIUM (test code = 3228024085) 1.5 mg/dL  1.7-2.4    L

          

 

                                                    Lab Interpretation (test cod

e = 

13487-2)        Abnormal                                        





[facility] [id number] 
15:23:35* 



                      Test Item  Value      Reference Range Interpretation Comme

nts

 

                                                    NA (test code = 

2677524462)     137 mmol/L      135-145                         

 

                                                    K (test code = 

8327386805)     4.4 mmol/L      3.5-5.0                         

 

                                                    CL (test code = 

7743110230)     100 mmol/L                                

 

                                                    CO2 TOTAL (test code = 

6831351850)     24 mmol/L       23-31                           

 

                                                    AGAP (test code = 

0317668389)                     2-16                            

 

                                                    BUN (test code = 

5129579856)     23 mg/dL        7-23                            

 

                                                    GLUCOSE (test code = 

1513841219)     200 mg/dL                 H               

 

                                                    CREATININE (test code = 

9815782188)     1.00 mg/dL      0.60-1.25                       

 

                                                    TOTAL BILI (test code = 

7922390536)     0.9 mg/dL       0.1-1.1                         

 

                                                    CALCIUM (test code = 

9368884028)     9.3 mg/dL       8.6-10.6                        

 

                                                    T PROTEIN (test code = 

4636269555)     7.5 g/dL        6.3-8.2                         

 

                                                    ALBUMIN (test code = 

5711290733)     5.1 g/dL        3.5-5.0         H               

 

                                                    ALK PHOS (test code = 

0519366015)     53 U/L                                    

 

                                                    ALTv (test code = 

1742-6)         23 U/L          5-50                            

 

                                                    AST(SGOT) (test code = 

5365540123)     19 U/L          13-40                           

 

                                                    eGFR (test code = 

6182692478)                     mL/min/1.73m2                   

 

                                        ARVIND (test code = ARVIND) Association of 

Glomerular Filtration 

Rate (GFR) and Staging 

of Kidney Disease* 

+---------------------

--+-------------------

--+-------------------

------+| GFR 

(mL/min/1.73 m2) ?| 

With Kidney Damage ?| 

?Without Kidney 

Damage+---------------

--------+-------------

--------+-------------

------------+| ?>90 ? 

? ? ? ? ? ? ? ?| 

?Stage one ? ? ? ? ?| 

? Normal ? ? ? ? ? ? ? 

?+--------------------

---+------------------

---+------------------

-------+| ?60-89 ? ? ? 

? ? ? ? ?| ?Stage two 

? ? ? ? ?| ? Decreased 

GFR ? ? ? ? 

+---------------------

--+-------------------

--+-------------------

------+| ?30-59 ? ? ? 

? ? ? ? ?| ?Stage 

three ? ? ? ?| ? Stage 

three ? ? ? ? ? 

+---------------------

--+-------------------

--+-------------------

------+| ?15-29 ? ? ? 

? ? ? ? ?| ?Stage four 

? ? ? ? | ? Stage four 

? ? ? ? ? 

?+--------------------

---+------------------

---+------------------

-------+| ?<15 (or 

dialysis) ? ?| ?Stage 

five ? ? ? ? | ? Stage 

five ? ? ? ? ? 

?+--------------------

---+------------------

---+------------------

-------+ *Each stage 

assumes the associated 

GFR level has been in 

effect for at least 

three months. ?Stages 

1 to 5, with or 

without kidney 

disease, indicate 

chronic kidney 

disease. Notes: 

Determination of 

stages one and two 

(with eGFR 

>59mL/min/1.73 m2) 

requires estimation of 

kidney damage for at 

least three months as 

defined by structural 

or functional 

abnormalities of the 

kidney, manifested by 

either:Pathological 

abnormalities or 

Markers of kidney 

damage (including 

abnormalities in the 

composition of the 

blood or urine or 

abnormalities in 

imaging tests).                                             

 

                                                    Lab Interpretation 

(test code = 06906-2) Abnormal                                        





[facility] 



                      Test Item  Value      Reference Range Interpretation Comme

nts

 

                      LIPASE (test code = 1512665304) 30 U/L     0-220          

       

 

                                                    Lab Interpretation (test cod

e = 

13241-5)        Normal                                          





[id number] 15:10:18* 



                      Test Item  Value      Reference Range Interpretation Comme

nts

 

                                                    WBC (test code = 

6690-2)                         See_Comment                     [Automated [employer]] 

The system which 

generated this 

result transmitted 

reference range: 

4.20 - 10.70 

10*3/?L. The 

reference range was 

not used to 

interpret this 

result as 

normal/abnormal.

 

                                                    RBC (test code = 

789-8)                          See_Comment                     [Automated messa

ge] 

The system which 

generated this 

result transmitted 

reference range: 

4.26 - 5.52 

10*6/?L. The 

reference range was 

not used to 

interpret this 

result as 

normal/abnormal.

 

                                                    HGB (test code = 

718-7)          14.3 g/dL       12.2-16.4                       

 

                                                    HCT (test code = 

4544-3)         41.6 %          38.4-49.3                       

 

                                                    MCV (test code = 

787-2)          84.4 fL         81.7-95.6                       

 

                                                    MCH (test code = 

785-6)          29.0 pg         26.1-32.7                       

 

                                                    MCHC (test code = 

786-4)          34.4 g/dL       31.2-35.0                       

 

                                                    RDW-SD (test code = 

11348-8)        38.2 fL         38.5-51.6       L               

 

                                                    RDW-CV (test code = 

788-0)          12.6 %          12.1-15.4                       

 

                                                    PLT (test code = 

777-3)                          See_Comment                     [Automated messa

ge] 

The system which 

generated this 

result transmitted 

reference range: 

150 - 328 10*3/?L. 

The reference range 

was not used to 

interpret this 

result as 

normal/abnormal.

 

                                                    MPV (test code = 

00725-4)        9.5 fL          9.8-13.0        L               

 

                                                    NRBC/100 WBC (test 

code = 2773463349)                 See_Comment                     [Automated me

ssage] 

The system which 

generated this 

result transmitted 

reference range: 

0.0 - 10.0 /100 

WBCs. The reference 

range was not used 

to interpret this 

result as 

normal/abnormal.

 

                                                    NRBC x10^3 (test code 

= 3548930551)                   See_Comment                     [Automated messa

ge] 

The system which 

generated this 

result transmitted 

reference range: 

10*3/?L. The 

reference range was 

not used to 

interpret this 

result as 

normal/abnormal.

 

                                                    GRAN MAT (NEUT) % 

(test code = 770-8) 88.1 %                                          

 

                                                    IMM GRAN % (test code 

= 5942445318)   0.40 %                                          

 

                                                    LYMPH % (test code = 

736-9)          3.6 %                                           

 

                                                    MONO % (test code = 

5905-5)         7.6 %                                           

 

                                                    EOS % (test code = 

713-8)          0.0 %                                           

 

                                                    BASO % (test code = 

706-2)          0.3 %                                           

 

                                                    GRAN MAT x10^3(ANC) 

(test code = 

6308543512)     8.08 10*3/uL    1.99-6.95       H               

 

                                                    IMM GRAN x10^3 (test 

code = 8621397630) 0.04 10*3/uL    0.00-0.06                       

 

                                                    LYMPH x10^3 (test code 

= 731-0)        0.33 10*3/uL    1.09-3.23       L               

 

                                                    MONO x10^3 (test code 

= 742-7)        0.70 10*3/uL    0.36-1.02                       

 

                                                    EOS x10^3 (test code = 

711-2)                          0.06-0.53       L               

 

                                                    BASO x10^3 (test code 

= 704-7)        0.03 10*3/uL    0.01-0.09                       

 

                                                    Lab Interpretation 

(test code = 33950-8) Abnormal                                        





[facility] HEMOGLOBIN A1C TEST2022-10-28 17:36:00* 



                      Test Item  Value      Reference Range Interpretation Comme

nts

 

                      POCT HBA1C (test code = 4548-4) 8.5 %      4-6        A   

       

 

                                                    Lab Interpretation (test cod

e = 

11544-0)        Abnormal                                        





[facility] HEMOGLOBIN A1C TEST2022-10-28 17:36:00* 



                      Test Item  Value      Reference Range Interpretation Comme

nts

 

                      POCT HBA1C (test code = 4548-4) 8.5 %      4-6        A   

       

 

                                                    Lab Interpretation (test cod

e = 

83000-1)        Abnormal                                        





[facility] HEMOGLOBIN A1C TEST2022-10-28 17:36:00* 



                      Test Item  Value      Reference Range Interpretation Comme

nts

 

                      POCT HBA1C (test code = 4548-4) 8.5 %      4-6        A   

       

 

                                                    Lab Interpretation (test cod

e = 

27131-0)        Abnormal                                        





[facility] HEMOGLOBIN A1C TEST2022-10-28 17:36:00* 



                      Test Item  Value      Reference Range Interpretation Comme

nts

 

                      POCT HBA1C (test code = 4548-4) 8.5 %      4-6        A   

       

 

                                                    Lab Interpretation (test cod

e = 

79144-3)        Abnormal                                        





[facility] HEMOGLOBIN A1C TEST2022-10-28 17:36:00* 



                      Test Item  Value      Reference Range Interpretation Comme

nts

 

                      POCT HBA1C (test code = 4548-4) 8.5 %      4-6        A   

       

 

                                                    Lab Interpretation (test cod

e = 

60828-7)        Abnormal                                        





[facility] 17:36:00* 



                      Test Item  Value      Reference Range Interpretation Comme

nts

 

                      POCT HBA1C (test code = 4548-4) 8.5 %      4-6        A   

       

 

                                                    Lab Interpretation (test cod

e = 

64752-2)        Abnormal                                        





[facility] 63

## 2025-02-06 NOTE — RAD REPORT
EXAMINATION: ONE VIEW CHEST XR



CLINICAL INDICATION: Male, 34 years old.,CHEST PAIN



TECHNIQUE: Frontal chest projection is submitted. Examination is limited by patient positioning and t
echnique.



COMPARISON: 7/19/2019



FINDINGS:

The lungs are well inflated and clear.  No pneumothorax or sizable effusion. The heart is normal in s
ize. Mediastinal contours are unremarkable.



IMPRESSION: 



No acute intrathoracic abnormalities. 







Reported By: Kvng Jansen 

Electronically Signed:  2/6/2025 8:07 PM

## 2025-02-06 NOTE — EDPHYS
Physician Documentation                                                                           

 [facility]                                                                 

Name: Mc Kearney                                                                           

Age: 34 yrs                                                                                       

Sex: Male                                                                                         

: 1990                                                                                   

MRN: [id number]                                                                                   

Arrival Date: 2025                                                                          

Time: 19:12                                                                                       

Account#: [id number]                                                                            

Bed DX4                                                                                           

Private MD:                                                                                       

ED Physician Maldonado Campos                                                                         

HPI:                                                                                              

                                                                                             

19:33 This 34 yrs old Male presents to ER via Unassigned with complaints of Chest Pain.       cp  

19:33 The patient or guardian reports chest pain that is located primarily in the anterior    cp  

      chest wall, bilaterally.                                                                    

19:33 The pain radiates to across chest.                                                      cp  

19:33 Associated signs and symptoms: Pertinent negatives: abdominal pain, cough, lower        cp  

      extremity pain, lower extremity swelling, palpitations, syncope, vomiting. The chest        

      pain is described as sharp. Duration: The patient or guardian reports a single episode,     

      that is now resolved.                                                                       

                                                                                                  

Historical:                                                                                       

- Allergies:                                                                                      

20:23 PENICILLINS;                                                                            br2 

                                                                                                  

- Immunization history:: Adult Immunizations up to date.                                          

- Infectious Disease History:: Denies.                                                            

- Social history:: Smoking status: Patient denies any tobacco usage or history of.                

                                                                                                  

                                                                                                  

ROS:                                                                                              

19:35 Cardiovascular: Positive for chest pain,                                                cp  

19:35 Constitutional: Negative for body aches, chills, fever, poor PO intake,                 cp  

19:35 Eyes: Negative for injury, pain, redness, and discharge,                                cp  

19:35 ENT: Negative for drainage from ear(s), ear pain, sore throat, difficulty swallowing,       

      difficulty handling secretions,                                                             

19:35 Respiratory: Negative for cough, shortness of breath, wheezing,                             

19:35 Abdomen/GI: Negative for abdominal pain, vomiting, diarrhea, constipation,                  

19:35 Neuro: Negative for altered mental status, dizziness, headache, weakness,                   

19:35 All other systems are negative,                                                             

                                                                                                  

Exam:                                                                                             

19:33 ECG was reviewed by the Attending Physician.                                            cp  

19:40 Constitutional: The patient appears in no acute distress, alert, awake,                 cp  

      non-diaphoretic, non-toxic, well developed, well nourished,                                 

19:40 Head/Face:  Normocephalic, atraumatic.                                                  cp  

19:40 Eyes: Periorbital structures: appear normal, Conjunctiva: normal, no exudate, no            

      injection, Sclera: no appreciated abnormality, Lids and lashes: appear normal,              

      bilaterally,                                                                                

19:40 ENT: External ear(s): are unremarkable, Nose: is normal, Mouth: Lips: moist, Oral           

      mucosa: moist, Posterior pharynx: Airway: normal,                                           

19:40 Neck: ROM/movement: is normal, is supple, without pain, no range of motions                 

      limitations,                                                                                

19:40 Chest/axilla: Inspection: normal,                                                           

19:40 Cardiovascular: Rate: normal, Rhythm: regular,                                              

19:40 Respiratory: the patient does not display signs of respiratory distress,  Respirations: cp  

      normal, no use of accessory muscles, no retractions, labored breathing, is not present,     

      Breath sounds: are clear throughout, no decreased breath sounds, no stridor, no             

      wheezing,                                                                                   

19:40 Abdomen/GI: Inspection: abdomen appears normal, Palpation: abdomen is soft and          cp  

      non-tender, in all quadrants,                                                               

19:40 Back: pain, is absent, ROM is normal,                                                       

19:40 Neuro: Orientation: to person, place \T\ time. Mentation: is normal, Motor: moves all       

      fours, strength is normal, Sensation: is normal,                                            

                                                                                                  

Vital Signs:                                                                                      

20:22  / 92; Pulse 84; Resp 18; Temp 97; Pulse Ox 99% ; Weight 86.18 kg; Height 5 ft. 9 br2 

      in. ; Pain 0/10;                                                                            

20:54  / 92; Pulse 85; Resp 18; Temp 98.6; Pulse Ox 98% ;                               go2 

22:33  / 72; Pulse 66; Resp 18; Pulse Ox 99% ;                                          cp4 

20:22 Body Mass Index 28.06 (86.18 kg, 175.26 cm)                                             br2 

20:22 Pain Scale: Adult                                                                       br2 

                                                                                                  

MDM:                                                                                              

19:31 Medical Screening Exam initiated                                                        cp  

23:54 Data reviewed: vital signs, nurses notes, lab test result(s), EKG, radiologic studies,  cp  

      plain films, and as a result, I will discharge patient.                                     

23:54 Differential diagnosis: abnormal EKG, acute myocardial infarction, cholecystitis,       cp  

      Cholelithiasis pericarditis, pleurisy, pneumonia, pneumothorax, pulmonary embolus. I        

      considered the following discharge prescriptions or medication management in the            

      emergency department Medications were administered in the Emergency Department. See         

      MAR. Independent interpretation of the following test(s) in the Emergency Department        

      EKG: See my EKG interpretation above. Counseling: I had a detailed discussion with the      

      patient and/or guardian regarding the historical points, exam findings, and any             

      diagnostic results supporting the discharge/admit diagnosis, lab results, radiology         

      results, the need for outpatient follow up, a cardiologist, to return to the emergency      

      department if symptoms worsen or persist or if there are any questions or concerns that     

      arise at home. Special discussion: Based on the patient's history, exam, and Dx             

      evaluation, there is no indication for emergent intervention or inpatient Tx. It is         

      understood by the patient/guardian that if the Sx's persist or worsen they need to          

      return immediately for re-evaluation.                                                       

                                                                                                  

                                                                                             

19:42 Order name: Basic Metabolic Panel; Complete Time: 21:31                                 cp  

02                                                                                             

21:31 Interpretation: Normal except: .                                                  cp  

                                                                                             

19:42 Order name: CBC with Diff; Complete Time: 21:31                                         cp  

                                                                                             

19:42 Order name: D-Dimer; Complete Time: 21:54                                               cp  

                                                                                             

21:54 Interpretation: Reviewed.                                                               cp  

                                                                                             

19:42 Order name: LFT's; Complete Time: 21:31                                                 cp  

                                                                                             

19:42 Order name: Magnesium; Complete Time: 21:31                                             cp  

                                                                                             

19:42 Order name: NT PRO-BNP; Complete Time: 21:31                                            cp  

                                                                                             

19:42 Order name: PT-INR; Complete Time: 21:54                                                cp  

                                                                                             

21:54 Interpretation: Reviewed.                                                               cp  

                                                                                             

19:42 Order name: Troponin HS; Complete Time: 21:31                                           cp  

/                                                                                             

21:31 Interpretation: Reviewed.                                                               cp  

                                                                                             

19:42 Order name: Lipase; Complete Time: 21:31                                                cp  

                                                                                             

22:20 Order name: Troponin HS; Complete Time: 23:52                                           cp  

02/                                                                                             

23:52 Interpretation: Reviewed.                                                               cp  

                                                                                             

19:42 Order name: XRAY Chest (1 view); Complete Time: 21:31                                   cp  

                                                                                             

19:42 Order name: EKG; Complete Time: 19:42                                                   cp  

                                                                                             

19:42 Order name: Cardiac monitoring; Complete Time: 21:44                                    cp  

                                                                                             

19:42 Order name: EKG - Nurse/Tech; Complete Time: 20:56                                      cp  

                                                                                             

19:42 Order name: IV Saline Lock; Complete Time: 20:44                                        cp  

                                                                                             

19:42 Order name: Labs collected and sent; Complete Time: 20:13                               cp  

                                                                                             

19:42 Order name: O2 Per Protocol; Complete Time: 20:56                                       cp  

                                                                                             

19:42 Order name: O2 Sat Monitoring; Complete Time: 20:56                                     cp  

                                                                                             

22:20 Order name: EKG - Nurse/Tech; Complete Time: 22:22                                      cp  

                                                                                                  

EC:33 Rate is 92 beats/min. Rhythm is regular. AK interval is normal. QRS interval is normal. cp  

      QT interval is normal. T waves are Inverted in lead aVR. Interpreted by me. Reviewed by     

      me.                                                                                         

                                                                                                  

Administered Medications:                                                                         

20:57 Drug: Aspirin PO Chewable Tablet 324 mg PO once; 81 mg tablets x 4 Route: PO;           go2 

21:30 Follow up: Response: No adverse reaction                                                br2 

                                                                                                  

                                                                                                  

Disposition:                                                                                      

20:39 I was immediately available on-site in the Emergency Department for consultation in the ms3 

      care of the patient.                                                                        

                                                                                                  

Disposition Summary:                                                                              

25 23:55                                                                                    

Discharge Ordered                                                                                 

 Notes:       Location: Home                                                                        
  cp

      Problem: new                                                                            cp  

      Symptoms: have improved                                                                 cp  

      Condition: Stable                                                                       cp  

      Diagnosis                                                                                   

        - Chest pain, unspecified                                                             cp  

      Followup:                                                                               cp  

        - With: Shorty Mcnair MD                                                                 

        - When: 5 - 6 days                                                                         

        - Reason: Recheck today's complaints                                                       

      Discharge Instructions:                                                                     

        - Discharge Summary Sheet                                                             cp  

        - Nonspecific Chest Pain, Adult                                                       cp  

        - Aspirin and Your Heart                                                              cp  

      Forms:                                                                                      

        - Medication Reconciliation Form                                                      cp  

        - Antibiotic Education                                                                cp  

        - Prescription Opioid Use                                                             cp  

        - Patient Portal Instructions                                                         cp  

        - Leadership Thank You Letter                                                         cp  

Signatures:                                                                                       

Dispatcher [employer]                           EDMS                                                 

Jean-Paul Vasquez PA PA   cp                                                   

Maldonado Campos DO                        DO   ms3                                                  

Judy Pacheco RN                     RN   br2                                                  

Em Fowler RN                       RN   go2                                                  

                                                                                                  

Corrections: (The following items were deleted from the chart)                                    

20:08 19:00 This 34 yrs old Male presents to ER via Unassigned with complaints of Chest Pain. cp  

      cp                                                                                          

20:56 20:56 PMHx: Seizures; Anxiety induced; go2                                              go2 

22:21 22:21 Troponin High Sensitivity+C.LAB.BRZ ordered. EDMS                                 EDMS

23:06 02 19:35 Cardiovascular: Positive for chest pain, cp                                 cp  

                                                                                                  

**************************************************************************************************

## 2025-02-06 NOTE — ER
Nurse's Notes                                                                                     

 [facility]                                                                 

Name: Mc Kearney                                                                           

Age: 34 yrs                                                                                       

Sex: Male                                                                                         

: 1990                                                                                   

MRN: [id number]                                                                                   

Arrival Date: 2025                                                                          

Time: 19:12                                                                                       

Account#: [id number]                                                                            

Bed DX4                                                                                           

Private MD:                                                                                       

Diagnosis: Chest pain, unspecified                                                                

                                                                                                  

Presentation:                                                                                     

                                                                                             

20:22 Chief complaint: Patient states: C/O INTERMITTENT CP SINCE 5PM TODAY, MIDSTERNAL AND    br2 

      SHARP. NO PAIN AT THIS TIME. Coronavirus screen: Client denies travel out of the [place]       

      in the last 14 days. Ebola Screen: Patient negative for fever greater than or equal to      

      101.5 degrees Fahrenheit, and additional compatible Ebola Virus Disease symptoms            

      Patient denies exposure to infectious person. Initial Sepsis Screen: Does the patient       

      meet any 2 criteria? No. Patient's initial sepsis screen is negative. Does the patient      

      have a suspected source of infection? No. Patient's initial sepsis screen is negative.      

      Risk Assessment: Do you want to hurt yourself or someone else? Patient reports no           

      desire to harm self or others. Onset of symptoms was 2025 at 17:00.            

20:22 Method Of Arrival: Ambulatory                                                           br2 

20:22 Acuity: YANNA 3                                                                           br2 

                                                                                                  

Triage Assessment:                                                                                

20:57 General: Appears in no apparent distress. comfortable, Behavior is calm, cooperative,   go2 

      appropriate for age. Pain: Denies pain.                                                     

                                                                                                  

Historical:                                                                                       

- Allergies:                                                                                      

20:23 PENICILLINS;                                                                            br2 

                                                                                                  

- Immunization history:: Adult Immunizations up to date.                                          

- Infectious Disease History:: Denies.                                                            

- Social history:: Smoking status: Patient denies any tobacco usage or history of.                

                                                                                                  

                                                                                                  

Screenin:54 [facility] ED Fall Risk Assessment (Adult) History of falling in the last 3 months,       go2 

      including since admission No falls in past 3 months (0 pts) Confusion or Disorientation     

      No (0 pts) Intoxicated or Sedated No (0 pts) Impaired Gait No (0 pts) Mobility Assist       

      Device Used No (0 pt) Altered Elimination No (0 pt) Score/Fall Risk Level 0 - 2 = Low       

      Risk. Abuse screen: Denies threats or abuse. Denies injuries from another. Nutritional      

      screening: No deficits noted. Tuberculosis screening: No symptoms or risk factors           

      identified.                                                                                 

                                                                                                  

Assessment:                                                                                       

20:53 Reassessment: Patient appears in no apparent distress at this time. No changes from     go2 

      previously documented assessment. General: Appears in no apparent distress.                 

      comfortable, Behavior is calm, cooperative, appropriate for age. Pain: Denies pain.         

      Pain does not radiate. Pain began Was experiencing chest pain earlier, no longer            

      experiencing discomfort. Neuro: No deficits noted. Cardiovascular: Reports chest pain,      

      Denies fatigue, lightheadedness, nausea, palpitations, shortness of breath, syncope,        

      vomiting. Respiratory: No deficits noted. Reports. GI: No deficits noted. No signs          

      and/or symptoms were reported involving the gastrointestinal system. : No deficits        

      noted. No signs and/or symptoms were reported regarding the genitourinary system. EENT:     

      No deficits noted. No signs and/or symptoms were reported regarding the EENT system.        

      Derm: No deficits noted. No signs and/or symptoms reported regarding the dermatologic       

      system. Musculoskeletal: No deficits noted. No signs and/or symptoms reported regarding     

      the musculoskeletal system.                                                                 

                                                                                                  

Vital Signs:                                                                                      

20:22  / 92; Pulse 84; Resp 18; Temp 97; Pulse Ox 99% ; Weight 86.18 kg; Height 5 ft. 9 br2 

      in. ; Pain 0/10;                                                                            

20:54  / 92; Pulse 85; Resp 18; Temp 98.6; Pulse Ox 98% ;                               go2 

22:33  / 72; Pulse 66; Resp 18; Pulse Ox 99% ;                                          cp4 

20:22 Body Mass Index 28.06 (86.18 kg, 175.26 cm)                                             br2 

20:22 Pain Scale: Adult                                                                       br2 

                                                                                                  

ED Course:                                                                                        

19:25 Patient arrived in ED.                                                                  im  

19:30 Jean-Paul Vasquez PA is PHCP.                                                                cp  

19:30 Maldonado Campos DO is Attending Physician.                                                cp  

20:00 XRAY Chest (1 view) In Process Unspecified.                                             EDMS

20:12 Em Fowler, KAM is Primary Nurse.                                                     go2 

20:23 Triage completed.                                                                       br2 

20:44 Inserted saline lock: 20 gauge in right antecubital area, using aseptic technique.      mm11

      Blood collected. Flushed with 10 mL NS.                                                     

20:44 Basic Metabolic Panel Sent.                                                             mm11

20:44 CBC with Diff Sent.                                                                     mm11

20:44 D-Dimer Sent.                                                                           mm11

20:44 LFT's Sent.                                                                             mm11

20:44 Magnesium Sent.                                                                         mm11

20:44 NT PRO-BNP Sent.                                                                        mm11

20:44 PT-INR Sent.                                                                            mm11

20:44 Troponin HS Sent.                                                                       mm11

20:54 No provider procedures requiring assistance completed. Patient maintains SpO2           go2 

      saturation greater than 95% on room air.                                                    

20:55 Arm band placed on right wrist.                                                         go2 

20:55 Patient has correct armband on for positive identification. Bed in low position. Call   go2 

      light in reach. Side rails up X2. Provided Education on: wait times, falll risk. Client     

      placed on continuous cardiac and pulse oximetry monitoring. NIBP monitoring applied.        

      Cardiac monitor on. Pulse ox on. NIBP on.                                                   

20:55 Client placed on continuous cardiac and pulse oximetry monitoring. NIBP monitoring      go2 

      applied.                                                                                    

20:56 Basic Metabolic Panel Sent.                                                             go2 

20:56 D-Dimer Sent.                                                                           go2 

20:56 LFT's Sent.                                                                             go2 

20:56 Magnesium Sent.                                                                         go2 

20:56 NT PRO-BNP Sent.                                                                        go2 

20:56 PT-INR Sent.                                                                            go2 

20:56 Troponin HS Sent.                                                                       go2 

23:55 Shorty Mcnair MD is Referral Physician.                                               cp  

                                                                                             

00:13 IV discontinued, intact, bleeding controlled, No redness/swelling at site. Pressure     br2 

      dressing applied.                                                                           

                                                                                                  

Administered Medications:                                                                         

                                                                                             

20:57 Drug: Aspirin PO Chewable Tablet 324 mg PO once; 81 mg tablets x 4 Route: PO;           go2 

21:30 Follow up: Response: No adverse reaction                                                br2 

                                                                                                  

                                                                                                  

Medication:                                                                                       

20:57 VIS not applicable for this client.                                                     go2 

                                                                                                  

Outcome:                                                                                          

23:55 Discharge ordered by MD.                                                                cp  

                                                                                             

00:15 Patient left the ED.                                                                    br2 

00:15 Discharged to home ambulatory,                                                          br2 

00:15 Condition: improved                                                                         

00:15 Discharge instructions given to patient, Instructed on discharge instructions, follow       

      up and referral plans. Demonstrated understanding of instructions, follow-up care,          

                                                                                                  

Signatures:                                                                                       

Dispatcher [employer]                           EDMS                                                 

Jean-Paul Vasquez PA PA cp Mendoza, Itzel im Potter, Christina                            cp4                                                  

Judy Pacheco RN RN   br2                                                  

Em Fowler RN                       RN   go2                                                  

eileen stanley                               mm11                                                 

                                                                                                  

Corrections: (The following items were deleted from the chart)                                    

                                                                                             

20:56 20:56 PMHx: Seizures; Anxiety induced; go2                                              go2 

                                                                                                  

**************************************************************************************************

## 2025-02-07 VITALS — TEMPERATURE: 98.6 F

## 2025-02-07 VITALS — DIASTOLIC BLOOD PRESSURE: 72 MMHG | SYSTOLIC BLOOD PRESSURE: 113 MMHG | OXYGEN SATURATION: 99 %

## 2025-02-07 NOTE — EKG
Test Date:    2025-02-06               Test Time:    19:26:07

Technician:   INDIO                                     

                                                     

MEASUREMENT RESULTS:                                       

Intervals:                                           

Rate:         92                                     

WA:           164                                    

QRSD:         84                                     

QT:           364                                    

QTc:          450                                    

Axis:                                                

P:            48                                     

WA:           164                                    

QRS:          51                                     

T:            38                                     

                                                     

INTERPRETIVE STATEMENTS:                                       

                                                     

Normal sinus rhythm

Normal ECG

No previous ECG available for comparison



Electronically Signed On 02-07-25 13:38:09 CST by Shorty Mcnair